# Patient Record
Sex: FEMALE | Race: BLACK OR AFRICAN AMERICAN | NOT HISPANIC OR LATINO | Employment: OTHER | URBAN - METROPOLITAN AREA
[De-identification: names, ages, dates, MRNs, and addresses within clinical notes are randomized per-mention and may not be internally consistent; named-entity substitution may affect disease eponyms.]

---

## 2023-02-11 ENCOUNTER — HOSPITAL ENCOUNTER (INPATIENT)
Facility: OTHER | Age: 83
LOS: 8 days | Discharge: HOME HEALTH | DRG: 313 | End: 2023-02-23
Attending: EMERGENCY MEDICINE | Admitting: INTERNAL MEDICINE
Payer: MEDICARE

## 2023-02-11 DIAGNOSIS — R53.81 DEBILITY: ICD-10-CM

## 2023-02-11 DIAGNOSIS — R07.9 CHEST PAIN: ICD-10-CM

## 2023-02-11 DIAGNOSIS — I20.0 UNSTABLE ANGINA: Primary | ICD-10-CM

## 2023-02-11 DIAGNOSIS — I21.4 NSTEMI (NON-ST ELEVATION MYOCARDIAL INFARCTION): ICD-10-CM

## 2023-02-11 DIAGNOSIS — R00.1 BRADYCARDIA: ICD-10-CM

## 2023-02-11 DIAGNOSIS — I21.4 NON-ST ELEVATION MYOCARDIAL INFARCTION (NSTEMI): ICD-10-CM

## 2023-02-11 LAB
ALBUMIN SERPL BCP-MCNC: 3.1 G/DL (ref 3.5–5.2)
ALP SERPL-CCNC: 89 U/L (ref 55–135)
ALT SERPL W/O P-5'-P-CCNC: 16 U/L (ref 10–44)
ANION GAP SERPL CALC-SCNC: 9 MMOL/L (ref 8–16)
AST SERPL-CCNC: 27 U/L (ref 10–40)
BASOPHILS # BLD AUTO: 0.03 K/UL (ref 0–0.2)
BASOPHILS NFR BLD: 0.5 % (ref 0–1.9)
BILIRUB SERPL-MCNC: 0.5 MG/DL (ref 0.1–1)
BUN SERPL-MCNC: 14 MG/DL (ref 8–23)
CALCIUM SERPL-MCNC: 10.8 MG/DL (ref 8.7–10.5)
CHLORIDE SERPL-SCNC: 97 MMOL/L (ref 95–110)
CO2 SERPL-SCNC: 30 MMOL/L (ref 23–29)
CREAT SERPL-MCNC: 0.8 MG/DL (ref 0.5–1.4)
CTP QC/QA: YES
D DIMER PPP IA.FEU-MCNC: 1.42 MG/L FEU
DIFFERENTIAL METHOD: ABNORMAL
EOSINOPHIL # BLD AUTO: 0.1 K/UL (ref 0–0.5)
EOSINOPHIL NFR BLD: 2.1 % (ref 0–8)
ERYTHROCYTE [DISTWIDTH] IN BLOOD BY AUTOMATED COUNT: 15 % (ref 11.5–14.5)
EST. GFR  (NO RACE VARIABLE): >60 ML/MIN/1.73 M^2
GIANT PLATELETS BLD QL SMEAR: PRESENT
GLUCOSE SERPL-MCNC: 221 MG/DL (ref 70–110)
HCT VFR BLD AUTO: 40.1 % (ref 37–48.5)
HGB BLD-MCNC: 13 G/DL (ref 12–16)
IMM GRANULOCYTES # BLD AUTO: 0.02 K/UL (ref 0–0.04)
IMM GRANULOCYTES NFR BLD AUTO: 0.3 % (ref 0–0.5)
LYMPHOCYTES # BLD AUTO: 1.9 K/UL (ref 1–4.8)
LYMPHOCYTES NFR BLD: 32.2 % (ref 18–48)
MCH RBC QN AUTO: 32.4 PG (ref 27–31)
MCHC RBC AUTO-ENTMCNC: 32.4 G/DL (ref 32–36)
MCV RBC AUTO: 100 FL (ref 82–98)
MONOCYTES # BLD AUTO: 0.5 K/UL (ref 0.3–1)
MONOCYTES NFR BLD: 8.6 % (ref 4–15)
NEUTROPHILS # BLD AUTO: 3.3 K/UL (ref 1.8–7.7)
NEUTROPHILS NFR BLD: 56.3 % (ref 38–73)
NRBC BLD-RTO: 0 /100 WBC
PLATELET # BLD AUTO: 124 K/UL (ref 150–450)
PLATELET BLD QL SMEAR: ABNORMAL
PMV BLD AUTO: 11 FL (ref 9.2–12.9)
POTASSIUM SERPL-SCNC: 5.3 MMOL/L (ref 3.5–5.1)
PROT SERPL-MCNC: 8.7 G/DL (ref 6–8.4)
RBC # BLD AUTO: 4.01 M/UL (ref 4–5.4)
SARS-COV-2 RDRP RESP QL NAA+PROBE: NEGATIVE
SODIUM SERPL-SCNC: 136 MMOL/L (ref 136–145)
TROPONIN I SERPL DL<=0.01 NG/ML-MCNC: 0.03 NG/ML (ref 0–0.03)
WBC # BLD AUTO: 5.84 K/UL (ref 3.9–12.7)

## 2023-02-11 PROCEDURE — 85379 FIBRIN DEGRADATION QUANT: CPT | Performed by: EMERGENCY MEDICINE

## 2023-02-11 PROCEDURE — 93005 ELECTROCARDIOGRAM TRACING: CPT

## 2023-02-11 PROCEDURE — 80053 COMPREHEN METABOLIC PANEL: CPT | Performed by: EMERGENCY MEDICINE

## 2023-02-11 PROCEDURE — 85025 COMPLETE CBC W/AUTO DIFF WBC: CPT | Performed by: EMERGENCY MEDICINE

## 2023-02-11 PROCEDURE — 84484 ASSAY OF TROPONIN QUANT: CPT | Performed by: EMERGENCY MEDICINE

## 2023-02-11 PROCEDURE — 93010 EKG 12-LEAD: ICD-10-PCS | Mod: ,,, | Performed by: INTERNAL MEDICINE

## 2023-02-11 PROCEDURE — 93010 ELECTROCARDIOGRAM REPORT: CPT | Mod: ,,, | Performed by: INTERNAL MEDICINE

## 2023-02-11 PROCEDURE — 99285 EMERGENCY DEPT VISIT HI MDM: CPT | Mod: 25

## 2023-02-12 PROBLEM — I10 PRIMARY HYPERTENSION: Status: ACTIVE | Noted: 2023-02-12

## 2023-02-12 PROBLEM — R79.89 ELEVATED TROPONIN: Status: ACTIVE | Noted: 2023-02-12

## 2023-02-12 PROBLEM — I20.0 UNSTABLE ANGINA: Status: ACTIVE | Noted: 2023-02-12

## 2023-02-12 PROBLEM — Z79.01 LONG TERM CURRENT USE OF ANTICOAGULANT: Status: ACTIVE | Noted: 2020-05-25

## 2023-02-12 PROBLEM — C50.919 BREAST CANCER: Status: ACTIVE | Noted: 2023-02-12

## 2023-02-12 PROBLEM — Z86.718 HISTORY OF DVT OF LOWER EXTREMITY: Status: ACTIVE | Noted: 2018-07-08

## 2023-02-12 PROBLEM — Z74.09 IMMOBILITY: Status: ACTIVE | Noted: 2023-02-12

## 2023-02-12 PROBLEM — E11.9 TYPE 2 DIABETES MELLITUS, WITH LONG-TERM CURRENT USE OF INSULIN: Status: ACTIVE | Noted: 2023-02-12

## 2023-02-12 PROBLEM — G89.3 CANCER RELATED PAIN: Status: ACTIVE | Noted: 2021-11-03

## 2023-02-12 PROBLEM — R52 PAIN: Status: ACTIVE | Noted: 2023-02-12

## 2023-02-12 PROBLEM — Z79.4 TYPE 2 DIABETES MELLITUS, WITH LONG-TERM CURRENT USE OF INSULIN: Status: ACTIVE | Noted: 2023-02-12

## 2023-02-12 PROBLEM — I48.91 ATRIAL FIBRILLATION: Status: ACTIVE | Noted: 2023-02-12

## 2023-02-12 PROBLEM — R53.81 DEBILITY: Status: ACTIVE | Noted: 2023-02-12

## 2023-02-12 LAB
ABO + RH BLD: NORMAL
APTT BLDCRRT: 27.5 SEC (ref 21–32)
BACTERIA #/AREA URNS HPF: ABNORMAL /HPF
BILIRUB UR QL STRIP: NEGATIVE
BLD GP AB SCN CELLS X3 SERPL QL: NORMAL
CHOLEST SERPL-MCNC: 150 MG/DL (ref 120–199)
CHOLEST/HDLC SERPL: 3.7 {RATIO} (ref 2–5)
CLARITY UR: CLEAR
COLOR UR: YELLOW
ESTIMATED AVG GLUCOSE: 186 MG/DL (ref 68–131)
GLUCOSE UR QL STRIP: NEGATIVE
HBA1C MFR BLD: 8.1 % (ref 4–5.6)
HDLC SERPL-MCNC: 41 MG/DL (ref 40–75)
HDLC SERPL: 27.3 % (ref 20–50)
HGB UR QL STRIP: ABNORMAL
INR PPP: 1 (ref 0.8–1.2)
KETONES UR QL STRIP: NEGATIVE
LDLC SERPL CALC-MCNC: 94.2 MG/DL (ref 63–159)
LEUKOCYTE ESTERASE UR QL STRIP: ABNORMAL
MICROSCOPIC COMMENT: ABNORMAL
NITRITE UR QL STRIP: POSITIVE
NONHDLC SERPL-MCNC: 109 MG/DL
PH UR STRIP: 6 [PH] (ref 5–8)
POCT GLUCOSE: 167 MG/DL (ref 70–110)
POCT GLUCOSE: 177 MG/DL (ref 70–110)
POCT GLUCOSE: 204 MG/DL (ref 70–110)
PROT UR QL STRIP: NEGATIVE
PROTHROMBIN TIME: 11.2 SEC (ref 9–12.5)
RBC #/AREA URNS HPF: 0 /HPF (ref 0–4)
SP GR UR STRIP: 1.01 (ref 1–1.03)
TRIGL SERPL-MCNC: 74 MG/DL (ref 30–150)
TROPONIN I SERPL DL<=0.01 NG/ML-MCNC: 0.03 NG/ML (ref 0–0.03)
TROPONIN I SERPL DL<=0.01 NG/ML-MCNC: 0.05 NG/ML (ref 0–0.03)
URN SPEC COLLECT METH UR: ABNORMAL
UROBILINOGEN UR STRIP-ACNC: NEGATIVE EU/DL
WBC #/AREA URNS HPF: 11 /HPF (ref 0–5)

## 2023-02-12 PROCEDURE — 36415 COLL VENOUS BLD VENIPUNCTURE: CPT | Performed by: HOSPITALIST

## 2023-02-12 PROCEDURE — 81000 URINALYSIS NONAUTO W/SCOPE: CPT | Performed by: EMERGENCY MEDICINE

## 2023-02-12 PROCEDURE — G0378 HOSPITAL OBSERVATION PER HR: HCPCS

## 2023-02-12 PROCEDURE — 87088 URINE BACTERIA CULTURE: CPT | Performed by: EMERGENCY MEDICINE

## 2023-02-12 PROCEDURE — 87077 CULTURE AEROBIC IDENTIFY: CPT | Performed by: EMERGENCY MEDICINE

## 2023-02-12 PROCEDURE — 85730 THROMBOPLASTIN TIME PARTIAL: CPT | Performed by: NURSE PRACTITIONER

## 2023-02-12 PROCEDURE — 63600175 PHARM REV CODE 636 W HCPCS: Performed by: NURSE PRACTITIONER

## 2023-02-12 PROCEDURE — 84484 ASSAY OF TROPONIN QUANT: CPT | Mod: 91 | Performed by: EMERGENCY MEDICINE

## 2023-02-12 PROCEDURE — 85610 PROTHROMBIN TIME: CPT | Performed by: NURSE PRACTITIONER

## 2023-02-12 PROCEDURE — 82962 GLUCOSE BLOOD TEST: CPT

## 2023-02-12 PROCEDURE — 87186 SC STD MICRODIL/AGAR DIL: CPT | Performed by: EMERGENCY MEDICINE

## 2023-02-12 PROCEDURE — 96372 THER/PROPH/DIAG INJ SC/IM: CPT | Performed by: NURSE PRACTITIONER

## 2023-02-12 PROCEDURE — 87086 URINE CULTURE/COLONY COUNT: CPT | Performed by: EMERGENCY MEDICINE

## 2023-02-12 PROCEDURE — 80061 LIPID PANEL: CPT | Performed by: NURSE PRACTITIONER

## 2023-02-12 PROCEDURE — 83036 HEMOGLOBIN GLYCOSYLATED A1C: CPT | Performed by: NURSE PRACTITIONER

## 2023-02-12 PROCEDURE — 99223 PR INITIAL HOSPITAL CARE,LEVL III: ICD-10-PCS | Mod: ,,, | Performed by: NURSE PRACTITIONER

## 2023-02-12 PROCEDURE — 97530 THERAPEUTIC ACTIVITIES: CPT

## 2023-02-12 PROCEDURE — 86900 BLOOD TYPING SEROLOGIC ABO: CPT | Performed by: NURSE PRACTITIONER

## 2023-02-12 PROCEDURE — 97166 OT EVAL MOD COMPLEX 45 MIN: CPT

## 2023-02-12 PROCEDURE — 25000003 PHARM REV CODE 250: Performed by: NURSE PRACTITIONER

## 2023-02-12 PROCEDURE — 99223 1ST HOSP IP/OBS HIGH 75: CPT | Mod: ,,, | Performed by: NURSE PRACTITIONER

## 2023-02-12 PROCEDURE — 25500020 PHARM REV CODE 255: Performed by: EMERGENCY MEDICINE

## 2023-02-12 PROCEDURE — 25000003 PHARM REV CODE 250: Performed by: PHYSICIAN ASSISTANT

## 2023-02-12 RX ORDER — RAMIPRIL 2.5 MG/1
2.5 CAPSULE ORAL DAILY
Status: ON HOLD | COMMUNITY
Start: 2023-01-13 | End: 2023-11-03

## 2023-02-12 RX ORDER — ANASTROZOLE 1 MG/1
1 TABLET ORAL DAILY
Status: DISCONTINUED | OUTPATIENT
Start: 2023-02-12 | End: 2023-02-23 | Stop reason: HOSPADM

## 2023-02-12 RX ORDER — ASPIRIN 325 MG
50000 TABLET, DELAYED RELEASE (ENTERIC COATED) ORAL
COMMUNITY
Start: 2023-02-07

## 2023-02-12 RX ORDER — NYSTATIN 100000 [USP'U]/G
POWDER TOPICAL 2 TIMES DAILY
COMMUNITY
Start: 2023-02-09

## 2023-02-12 RX ORDER — TALC
6 POWDER (GRAM) TOPICAL NIGHTLY PRN
Status: CANCELLED | OUTPATIENT
Start: 2023-02-12

## 2023-02-12 RX ORDER — PANTOPRAZOLE SODIUM 20 MG/1
20 TABLET, DELAYED RELEASE ORAL DAILY PRN
Status: ON HOLD | COMMUNITY
Start: 2022-12-05 | End: 2023-11-08 | Stop reason: HOSPADM

## 2023-02-12 RX ORDER — DILTIAZEM HYDROCHLORIDE 120 MG/1
120 CAPSULE, COATED, EXTENDED RELEASE ORAL DAILY
Status: ON HOLD | COMMUNITY
Start: 2022-11-12 | End: 2023-02-15 | Stop reason: HOSPADM

## 2023-02-12 RX ORDER — TIZANIDINE 2 MG/1
2 TABLET ORAL 2 TIMES DAILY PRN
Status: ON HOLD | COMMUNITY
Start: 2022-12-05 | End: 2023-11-03

## 2023-02-12 RX ORDER — SODIUM CHLORIDE 0.9 % (FLUSH) 0.9 %
10 SYRINGE (ML) INJECTION
Status: DISCONTINUED | OUTPATIENT
Start: 2023-02-12 | End: 2023-02-23 | Stop reason: HOSPADM

## 2023-02-12 RX ORDER — ESCITALOPRAM OXALATE 10 MG/1
10 TABLET ORAL DAILY
Status: DISCONTINUED | OUTPATIENT
Start: 2023-02-12 | End: 2023-02-23 | Stop reason: HOSPADM

## 2023-02-12 RX ORDER — APIXABAN 5 MG/1
5 TABLET, FILM COATED ORAL 2 TIMES DAILY
COMMUNITY
Start: 2023-01-13

## 2023-02-12 RX ORDER — TRAZODONE HYDROCHLORIDE 50 MG/1
50 TABLET ORAL NIGHTLY
Status: DISCONTINUED | OUTPATIENT
Start: 2023-02-12 | End: 2023-02-23 | Stop reason: HOSPADM

## 2023-02-12 RX ORDER — AMOXICILLIN 250 MG
1 CAPSULE ORAL DAILY
COMMUNITY

## 2023-02-12 RX ORDER — IBUPROFEN 200 MG
16 TABLET ORAL
Status: DISCONTINUED | OUTPATIENT
Start: 2023-02-12 | End: 2023-02-23 | Stop reason: HOSPADM

## 2023-02-12 RX ORDER — FLECAINIDE ACETATE 50 MG/1
100 TABLET ORAL EVERY 12 HOURS
Status: DISCONTINUED | OUTPATIENT
Start: 2023-02-12 | End: 2023-02-23 | Stop reason: HOSPADM

## 2023-02-12 RX ORDER — HYDROCHLOROTHIAZIDE 25 MG/1
25 TABLET ORAL DAILY
Status: DISCONTINUED | OUTPATIENT
Start: 2023-02-12 | End: 2023-02-14

## 2023-02-12 RX ORDER — GLYBURIDE 5 MG/1
5 TABLET ORAL EVERY MORNING
COMMUNITY
Start: 2023-01-12

## 2023-02-12 RX ORDER — GLUCAGON 1 MG
1 KIT INJECTION
Status: DISCONTINUED | OUTPATIENT
Start: 2023-02-12 | End: 2023-02-23 | Stop reason: HOSPADM

## 2023-02-12 RX ORDER — LISINOPRIL 2.5 MG/1
2.5 TABLET ORAL DAILY
Status: DISCONTINUED | OUTPATIENT
Start: 2023-02-12 | End: 2023-02-12

## 2023-02-12 RX ORDER — FUROSEMIDE 20 MG/1
20 TABLET ORAL 2 TIMES DAILY
Status: DISCONTINUED | OUTPATIENT
Start: 2023-02-12 | End: 2023-02-23 | Stop reason: HOSPADM

## 2023-02-12 RX ORDER — HYDROCODONE BITARTRATE AND ACETAMINOPHEN 5; 325 MG/1; MG/1
1 TABLET ORAL 2 TIMES DAILY PRN
Status: DISCONTINUED | OUTPATIENT
Start: 2023-02-12 | End: 2023-02-13

## 2023-02-12 RX ORDER — ANASTROZOLE 1 MG/1
1 TABLET ORAL DAILY
COMMUNITY
Start: 2023-01-26

## 2023-02-12 RX ORDER — HYDROCODONE BITARTRATE AND ACETAMINOPHEN 5; 325 MG/1; MG/1
1 TABLET ORAL 2 TIMES DAILY PRN
Status: ON HOLD | COMMUNITY
Start: 2023-01-20 | End: 2023-11-03

## 2023-02-12 RX ORDER — PANTOPRAZOLE SODIUM 40 MG/1
40 TABLET, DELAYED RELEASE ORAL DAILY PRN
Status: DISCONTINUED | OUTPATIENT
Start: 2023-02-12 | End: 2023-02-23 | Stop reason: HOSPADM

## 2023-02-12 RX ORDER — HEPARIN SODIUM 5000 [USP'U]/ML
5000 INJECTION, SOLUTION INTRAVENOUS; SUBCUTANEOUS EVERY 8 HOURS
Status: DISCONTINUED | OUTPATIENT
Start: 2023-02-12 | End: 2023-02-12

## 2023-02-12 RX ORDER — ATORVASTATIN CALCIUM 20 MG/1
80 TABLET, FILM COATED ORAL DAILY
Status: DISCONTINUED | OUTPATIENT
Start: 2023-02-12 | End: 2023-02-23 | Stop reason: HOSPADM

## 2023-02-12 RX ORDER — NAPROXEN SODIUM 220 MG/1
81 TABLET, FILM COATED ORAL DAILY
Status: DISCONTINUED | OUTPATIENT
Start: 2023-02-13 | End: 2023-02-12

## 2023-02-12 RX ORDER — LISINOPRIL 2.5 MG/1
2.5 TABLET ORAL DAILY
Status: DISCONTINUED | OUTPATIENT
Start: 2023-02-13 | End: 2023-02-23 | Stop reason: HOSPADM

## 2023-02-12 RX ORDER — DILTIAZEM HYDROCHLORIDE 120 MG/1
120 CAPSULE, COATED, EXTENDED RELEASE ORAL DAILY
Status: DISCONTINUED | OUTPATIENT
Start: 2023-02-12 | End: 2023-02-14

## 2023-02-12 RX ORDER — IBUPROFEN 200 MG
24 TABLET ORAL
Status: DISCONTINUED | OUTPATIENT
Start: 2023-02-12 | End: 2023-02-23 | Stop reason: HOSPADM

## 2023-02-12 RX ORDER — POLYETHYLENE GLYCOL 3350 17 G/17G
17 POWDER, FOR SOLUTION ORAL DAILY PRN
COMMUNITY

## 2023-02-12 RX ORDER — ONDANSETRON 2 MG/ML
4 INJECTION INTRAMUSCULAR; INTRAVENOUS EVERY 8 HOURS PRN
Status: CANCELLED | OUTPATIENT
Start: 2023-02-12

## 2023-02-12 RX ORDER — MICONAZOLE NITRATE 2 %
POWDER (GRAM) TOPICAL 2 TIMES DAILY
Status: DISCONTINUED | OUTPATIENT
Start: 2023-02-12 | End: 2023-02-23 | Stop reason: HOSPADM

## 2023-02-12 RX ORDER — TIZANIDINE 2 MG/1
2 TABLET ORAL 2 TIMES DAILY PRN
Status: DISCONTINUED | OUTPATIENT
Start: 2023-02-12 | End: 2023-02-23 | Stop reason: HOSPADM

## 2023-02-12 RX ORDER — FLECAINIDE ACETATE 100 MG/1
100 TABLET ORAL EVERY 12 HOURS
Status: ON HOLD | COMMUNITY
Start: 2023-01-13 | End: 2023-11-08 | Stop reason: HOSPADM

## 2023-02-12 RX ORDER — DIPHENHYDRAMINE HCL 50 MG
50 CAPSULE ORAL NIGHTLY PRN
COMMUNITY

## 2023-02-12 RX ORDER — TRAZODONE HYDROCHLORIDE 50 MG/1
50 TABLET ORAL NIGHTLY
Status: ON HOLD | COMMUNITY
Start: 2022-11-12 | End: 2023-11-08 | Stop reason: HOSPADM

## 2023-02-12 RX ORDER — INSULIN ASPART 100 [IU]/ML
0-5 INJECTION, SOLUTION INTRAVENOUS; SUBCUTANEOUS
Status: DISCONTINUED | OUTPATIENT
Start: 2023-02-12 | End: 2023-02-23 | Stop reason: HOSPADM

## 2023-02-12 RX ORDER — AMOXICILLIN 250 MG
1 CAPSULE ORAL DAILY
Status: DISCONTINUED | OUTPATIENT
Start: 2023-02-12 | End: 2023-02-13

## 2023-02-12 RX ORDER — TRAMADOL HYDROCHLORIDE 50 MG/1
50 TABLET ORAL EVERY 8 HOURS PRN
Status: DISCONTINUED | OUTPATIENT
Start: 2023-02-12 | End: 2023-02-13

## 2023-02-12 RX ORDER — METOPROLOL TARTRATE 25 MG/1
25 TABLET, FILM COATED ORAL 2 TIMES DAILY
Status: DISCONTINUED | OUTPATIENT
Start: 2023-02-12 | End: 2023-02-12

## 2023-02-12 RX ORDER — POLYETHYLENE GLYCOL 3350 17 G/17G
17 POWDER, FOR SOLUTION ORAL 2 TIMES DAILY PRN
Status: DISCONTINUED | OUTPATIENT
Start: 2023-02-12 | End: 2023-02-13

## 2023-02-12 RX ADMIN — INSULIN ASPART 1 UNITS: 100 INJECTION, SOLUTION INTRAVENOUS; SUBCUTANEOUS at 09:02

## 2023-02-12 RX ADMIN — ESCITALOPRAM OXALATE 10 MG: 10 TABLET ORAL at 09:02

## 2023-02-12 RX ADMIN — TRAZODONE HYDROCHLORIDE 50 MG: 50 TABLET ORAL at 09:02

## 2023-02-12 RX ADMIN — HYDROCODONE BITARTRATE AND ACETAMINOPHEN 1 TABLET: 5; 325 TABLET ORAL at 05:02

## 2023-02-12 RX ADMIN — ANASTROZOLE 1 MG: 1 TABLET, COATED ORAL at 02:02

## 2023-02-12 RX ADMIN — MICONAZOLE NITRATE: 20 POWDER TOPICAL at 09:02

## 2023-02-12 RX ADMIN — HEPARIN SODIUM 5000 UNITS: 5000 INJECTION INTRAVENOUS; SUBCUTANEOUS at 05:02

## 2023-02-12 RX ADMIN — FUROSEMIDE 20 MG: 20 TABLET ORAL at 09:02

## 2023-02-12 RX ADMIN — DOCUSATE SODIUM AND SENNOSIDES 1 TABLET: 8.6; 5 TABLET, FILM COATED ORAL at 12:02

## 2023-02-12 RX ADMIN — FLECAINIDE ACETATE 100 MG: 50 TABLET ORAL at 09:02

## 2023-02-12 RX ADMIN — IOHEXOL 100 ML: 350 INJECTION, SOLUTION INTRAVENOUS at 12:02

## 2023-02-12 RX ADMIN — POLYETHYLENE GLYCOL 3350 17 G: 17 POWDER, FOR SOLUTION ORAL at 12:02

## 2023-02-12 RX ADMIN — APIXABAN 5 MG: 2.5 TABLET, FILM COATED ORAL at 09:02

## 2023-02-12 RX ADMIN — DILTIAZEM HYDROCHLORIDE 120 MG: 120 CAPSULE, COATED, EXTENDED RELEASE ORAL at 12:02

## 2023-02-12 RX ADMIN — ATORVASTATIN CALCIUM 80 MG: 20 TABLET, FILM COATED ORAL at 09:02

## 2023-02-12 RX ADMIN — HYDROCHLOROTHIAZIDE 25 MG: 25 TABLET ORAL at 09:02

## 2023-02-12 RX ADMIN — METOPROLOL TARTRATE 25 MG: 25 TABLET, FILM COATED ORAL at 09:02

## 2023-02-12 NOTE — SUBJECTIVE & OBJECTIVE
Interval History: Resting in bed, reports ongoing bilateral shoulder and knee pain that is somewhat improved with PRNs. She denies cardiac chest pain or associated symptoms. She is interested in SNF placement.     Review of Systems   Constitutional:  Positive for activity change and fatigue. Negative for appetite change, chills and diaphoresis.   HENT:  Negative for sore throat and trouble swallowing.    Respiratory:  Negative for cough, chest tightness, shortness of breath and wheezing.    Cardiovascular:  Negative for chest pain, palpitations and leg swelling.   Gastrointestinal:  Positive for constipation (chronic). Negative for abdominal pain, diarrhea, nausea and vomiting.   Genitourinary:  Negative for decreased urine volume, difficulty urinating, dysuria and urgency.   Musculoskeletal:  Positive for arthralgias and gait problem. Negative for myalgias.   Skin: Negative.    Neurological:  Positive for weakness. Negative for dizziness, syncope, light-headedness and headaches.     Objective:     Vital Signs (Most Recent):  Temp: 99.3 °F (37.4 °C) (02/12/23 1211)  Pulse: 77 (02/12/23 1211)  Resp: 18 (02/12/23 1211)  BP: (!) 126/91 (02/12/23 1211)  SpO2: 99 % (02/12/23 1211)   Vital Signs (24h Range):  Temp:  [98.2 °F (36.8 °C)-99.3 °F (37.4 °C)] 99.3 °F (37.4 °C)  Pulse:  [51-78] 77  Resp:  [16-20] 18  SpO2:  [92 %-100 %] 99 %  BP: (114-148)/(56-91) 126/91        Body mass index is 45.76 kg/m².    Intake/Output Summary (Last 24 hours) at 2/13/2023 1309  Last data filed at 2/13/2023 0457  Gross per 24 hour   Intake --   Output 1001 ml   Net -1001 ml      Physical Exam  Vitals and nursing note reviewed.   Constitutional:       General: She is not in acute distress.     Appearance: Normal appearance. She is obese. She is not toxic-appearing.   Eyes:      Extraocular Movements: Extraocular movements intact.      Conjunctiva/sclera: Conjunctivae normal.   Cardiovascular:      Rate and Rhythm: Normal rate and regular  rhythm.      Heart sounds: Normal heart sounds.   Pulmonary:      Effort: Pulmonary effort is normal.      Breath sounds: Normal breath sounds.   Abdominal:      General: Bowel sounds are normal. There is no distension.      Palpations: Abdomen is soft.   Musculoskeletal:      Cervical back: Neck supple.      Right lower leg: No edema.      Left lower leg: No edema.   Skin:     General: Skin is warm and dry.   Neurological:      General: No focal deficit present.      Mental Status: She is alert. Mental status is at baseline.     Significant Labs: All pertinent labs within the past 24 hours have been reviewed.  CBC:   Recent Labs   Lab 02/11/23 2210   WBC 5.84   HGB 13.0   HCT 40.1   *     CMP:   Recent Labs   Lab 02/11/23  2210 02/13/23  0412 02/13/23  1224    137 137   K 5.3* 3.9 4.1   CL 97 96 95   CO2 30* 36* 34*   * 161* 271*   BUN 14 13 14   CREATININE 0.8 0.8 0.8   CALCIUM 10.8* 11.0* 10.8*   PROT 8.7*  --   --    ALBUMIN 3.1*  --  3.0*   BILITOT 0.5  --   --    ALKPHOS 89  --   --    AST 27  --   --    ALT 16  --   --    ANIONGAP 9 5* 8     Significant Imaging: I have reviewed all pertinent imaging results/findings within the past 24 hours.

## 2023-02-12 NOTE — PT/OT/SLP PROGRESS
Occupational Therapy      Patient Name:  Dania Her   MRN:  [id number]    Patient not seen today secondary to upward trending troponin currently 0.029. Awaiting ECHO and cards consult.  Will follow-up when appropriate.    2/12/2023

## 2023-02-12 NOTE — HPI
"Per admitting provider:  "The patient is a 81 yo female with a past medical history of atrial fibrillation, HTN, IDDM, and pulmonary hypertension who presents for evaluation of increasing frequency of her standing intermittent chest pain over the past few to many days. She states the chest pain sometimes occurs with exertion and other times has no inciting factors. She endorses associated dyspnea with these episodes and reports worsening leg swelling. She also notes productive cough with clear mucus "when I have hot soup or pepper." She states she has had episodes of diaphoresis "and I get hot hot hot." She attributes this to her breast cancer related medication, denies any acute change, or relation to her episodes of chest pain.  Denies recent fever, chills, pain elsewhere, and other somatic complaints.  The patient has a mildly elevated troponin to .028, d-dimer 1.42.  She will be admitted for further management of her elevated troponin and Cardiology consult."    History clarified with the patient, she reports she is been slowly becoming more debilitated and for the last 2 months she is been unable to walk.  Therefore, she turned the living room into her bedroom as she is unable to go up the stairs.  She lives at home with her 83-year-old  who also has multiple medical issues.  He is unable to provide proper care for her and her children her only able to stop by every now and then.  She has a sitter who comes only 3 days per week.  The rest of the time, she has no one to change her or turn her.  She said it has become increasingly clear to her that she may need to move into a nursing facility.  The reason she came to the hospital yesterday because her daughter decided to have a birthday party in the living room which is her bedroom.  She was upset about all the people invading her personal space and because she got upset all of her chronic aches and pains were exacerbated.  The chest pain she reported is " the same pain she has had on and off since her mastectomy, she also reported back pain and knee pain.  She denied any associated nausea, jaw pain, arm pain, diaphoresis, or shortness of breath.

## 2023-02-12 NOTE — ED NOTES
Pt  w/ eyes closed, mumbling, does open eyes and yell w/ verbal stimuli, repositioned to left lateral, pillow to back, side rails up times 2.VSS

## 2023-02-12 NOTE — PLAN OF CARE
Problem: Occupational Therapy  Goal: Occupational Therapy Goal  Description: Goals to be met by: 2/26/2023     Patient will increase functional independence with ADLs by performing:    Grooming while EOB with Minimal Assistance.  Sitting at edge of bed x15 minutes with Minimal Assistance.  Rolling to Bilateral with Moderate Assistance.   Supine to sit with Maximum Assistance.    Outcome: Ongoing, Progressing     Initial OT eval/treat complete.  Has leonel lift and W/C.  Currently needs hospital bed with pressure relieving mattress though will defer to next level of care.  Will require setting with 24/7 supervision/assist.  Anticipate SNF with transition to nursing home pending ability of family to continue providing care though will confer with PT to finalize discharge recommendation.  To benefit from continued acute care OT services to increase independence in self-care/functional transfers.  OT to follow.

## 2023-02-12 NOTE — PLAN OF CARE
MSW met with the patient at the bedside.     Patient is alert and oriented with no communication barriers.     Patient has a RW,WC & Cane.    Patients PCP is correct on the face sheet.     Patient choice pharmacy is bedside delivery.     Patients family will transport the patient home at discharge.     CM team will continue to follow.      02/12/23 1420   Discharge Assessment   Assessment Type Discharge Planning Assessment   Confirmed/corrected address, phone number and insurance Yes   Confirmed Demographics Correct on Facesheet   Source of Information patient;health record   People in Home significant other   Do you expect to return to your current living situation? Yes   Prior to hospitilization cognitive status: Alert/Oriented   Current cognitive status: Alert/Oriented   Walking or Climbing Stairs ambulation difficulty, requires equipment   Dressing/Bathing bathing difficulty, requires equipment   Equipment Currently Used at Home walker, rolling;cane, quad;wheelchair   Readmission within 30 days? No   Patient currently being followed by outpatient case management? No   Do you currently have service(s) that help you manage your care at home? No   Do you take prescription medications? Yes   Do you have prescription coverage? Yes   Do you have any problems affording any of your prescribed medications? No   Is the patient taking medications as prescribed? no   How do you get to doctors appointments? family or friend will provide;health plan transportation   Are you on dialysis? No  (Diabetic)   Do you take coumadin? No   Discharge Plan A Home with family;Home Health   Discharge Plan B Skilled Nursing Facility   DME Needed Upon Discharge  none   Discharge Plan discussed with: Patient   Discharge Barriers Identified None

## 2023-02-12 NOTE — ASSESSMENT & PLAN NOTE
Patient's FSGs are controlled on current medication regimen.  Last A1c reviewed-   Lab Results   Component Value Date    HGBA1C 7.8 (H) 11/07/2021     Most recent fingerstick glucose reviewed- No results for input(s): POCTGLUCOSE in the last 24 hours.  Current correctional scale  Low  Maintain anti-hyperglycemic dose as follows-   Antihyperglycemics (From admission, onward)    Start     Stop Route Frequency Ordered    02/12/23 0531  insulin aspart U-100 pen 0-5 Units         -- SubQ Before meals & nightly PRN 02/12/23 0431        Hold Oral hypoglycemics while patient is in the hospital.

## 2023-02-12 NOTE — HOSPITAL COURSE
Ms. Her was placed in observation 2/11 due to exacerbations of her chronic chest, back, and knee pain brought on by emotional distress at not being well cared for at home. Troponin trend 0.028 >> 0.046 >> 0.028; EKG with NSR and TWA/inferolateral changes that are seen on prior studies per Care Everywhere. D-dimer 1.42 with negative CTA chest. Intermittently with nonsustained SVT and also pauses, caridology consulted: diltiazem discontinued, discussed with patient's cardiologist outpatient, PPM placement to be considered in the future.Palliative Care consulted, appreciate assistance. PT/OT following and recommending SNF.     Disposition plans: CM/SW following with plans for jail placement since SNF was denied by insurance.  However family eventually decided to take patient home with [facility]. Stable for dc with PCP fu, return precautions discussed, no further questions at dc. Cardiology and PCP follow up advised at DC; follows with Cardiology at [facility].

## 2023-02-12 NOTE — ED TRIAGE NOTES
Dania Her, an 82 y.o. female presents to the ED c/o intermittent CP for two years, buttock pain. Pt took home medications and denies CP upon arrival to ED.       Chief Complaint   Patient presents with    Chest Pain     Intermittent daily CP times 6 weeks.  Pt took all home medications 45min prior to arrival pt denies chest pain upon arrival to ED     Review of patient's allergies indicates:  No Known Allergies  Past Medical History:   Diagnosis Date    Arthritis     Diabetes mellitus     Hypertension

## 2023-02-12 NOTE — ED NOTES
"Pt easily awakened, reports being "bed bound for 2 months", pt reports being taken care of at home by  and home health, limited ROM due to overall soreness , left shoulder w/ limited ROM due to "rotator cuff issues w/ my left shoulder", no surgery stated.  Side rails up times 2.  "

## 2023-02-12 NOTE — ASSESSMENT & PLAN NOTE
Troponin .028, .046; No chest pain currently.      Consult Cardiology  Trend Troponin  Echo  Cardiac monitoring

## 2023-02-12 NOTE — SUBJECTIVE & OBJECTIVE
Past Medical History:   Diagnosis Date    Arthritis     Diabetes mellitus     Hypertension        Past Surgical History:   Procedure Laterality Date     SECTION      OOPHORECTOMY         Review of patient's allergies indicates:  No Known Allergies    No current facility-administered medications on file prior to encounter.     Current Outpatient Medications on File Prior to Encounter   Medication Sig    escitalopram oxalate (LEXAPRO) 10 MG tablet Take 10 mg by mouth once daily.    furosemide (LASIX) 20 MG tablet Take 20 mg by mouth 2 (two) times daily.    hydrochlorothiazide (HYDRODIURIL) 25 MG tablet Take 25 mg by mouth once daily.    ramipril (ALTACE) 1.25 MG capsule Take 1.25 mg by mouth once daily.    tramadol (ULTRAM) 50 mg tablet Take 50 mg by mouth every 6 (six) hours as needed for Pain.     Family History    None       Tobacco Use    Smoking status: Never    Smokeless tobacco: Not on file   Substance and Sexual Activity    Alcohol use: Not on file    Drug use: Not on file    Sexual activity: Not on file     Review of Systems   Constitutional:  Positive for activity change. Negative for appetite change and fever.   HENT:  Negative for congestion, ear pain, rhinorrhea and sinus pressure.    Eyes:  Negative for pain and discharge.   Respiratory:  Negative for cough, chest tightness, shortness of breath and wheezing.    Cardiovascular:  Positive for chest pain. Negative for leg swelling.   Gastrointestinal:  Negative for abdominal distention, abdominal pain, diarrhea, nausea and vomiting.   Endocrine: Negative for cold intolerance and heat intolerance.   Genitourinary:  Negative for difficulty urinating, flank pain, frequency, hematuria and urgency.   Musculoskeletal:  Negative for arthralgias, joint swelling and myalgias.   Allergic/Immunologic: Negative for environmental allergies and food allergies.   Neurological:  Negative for dizziness, weakness, light-headedness and headaches.   Hematological:   Does not bruise/bleed easily.   Psychiatric/Behavioral:  Negative for agitation, behavioral problems and decreased concentration.    Objective:     Vital Signs (Most Recent):  Temp: 98.2 °F (36.8 °C) (02/11/23 2145)  Pulse: (!) 51 (02/12/23 0216)  Resp: 18 (02/12/23 0216)  BP: (!) 114/56 (02/12/23 0216)  SpO2: 99 % (02/12/23 0216)   Vital Signs (24h Range):  Temp:  [98.2 °F (36.8 °C)] 98.2 °F (36.8 °C)  Pulse:  [51-60] 51  Resp:  [16-20] 18  SpO2:  [96 %-100 %] 99 %  BP: (114-139)/(56-70) 114/56        There is no height or weight on file to calculate BMI.    Physical Exam  Constitutional:       Appearance: Normal appearance. She is well-developed.   HENT:      Head: Normocephalic.   Eyes:      General:         Right eye: No discharge.         Left eye: No discharge.      Conjunctiva/sclera: Conjunctivae normal.   Cardiovascular:      Rate and Rhythm: Normal rate and regular rhythm.      Pulses:           Radial pulses are 2+ on the right side and 2+ on the left side.      Heart sounds: Normal heart sounds.   Pulmonary:      Effort: Pulmonary effort is normal. No respiratory distress.      Breath sounds: Normal breath sounds.   Abdominal:      General: Bowel sounds are decreased. There is no distension.      Palpations: Abdomen is soft.      Tenderness: There is no abdominal tenderness.   Musculoskeletal:         General: Normal range of motion.      Cervical back: Normal range of motion and neck supple.   Skin:     General: Skin is warm and dry.      Capillary Refill: Capillary refill takes 2 to 3 seconds.   Neurological:      Mental Status: She is alert and oriented to person, place, and time.      GCS: GCS eye subscore is 4. GCS verbal subscore is 5. GCS motor subscore is 6.      Motor: Motor function is intact.   Psychiatric:         Mood and Affect: Mood normal.         Speech: Speech normal.         Behavior: Behavior normal.         Thought Content: Thought content normal.           Significant Labs: All  pertinent labs within the past 24 hours have been reviewed.  CBC:   Recent Labs   Lab 02/11/23  2210   WBC 5.84   HGB 13.0   HCT 40.1   *     CMP:   Recent Labs   Lab 02/11/23  2210      K 5.3*   CL 97   CO2 30*   *   BUN 14   CREATININE 0.8   CALCIUM 10.8*   PROT 8.7*   ALBUMIN 3.1*   BILITOT 0.5   ALKPHOS 89   AST 27   ALT 16   ANIONGAP 9       Significant Imaging: I have reviewed all pertinent imaging results/findings within the past 24 hours.

## 2023-02-12 NOTE — ED PROVIDER NOTES
"  Source of History:  Medical record, patient    Chief complaint:  Per triage note: "Chest Pain (Intermittent daily CP times 6 weeks.  Pt took all home medications 45min prior to arrival pt denies chest pain upon arrival to ED)  "    HPI:    Patient presents to the ED via EMS for evaluation of increasing frequency of her standing intermittent chest pain over the past few to many days. She states the chest pain sometimes occurs with exertion and other times has no inciting factors. She endorses associated dyspnea with these episodes and reports worsening leg swelling. She also notes productive cough with clear mucus "when I have hot soup or pepper." She states she has had episodes of diaphoresis "and I get hot hot hot." She attributes this to her breast cancer related medication, denies any acute change, or relation to her episodes of chest pain.   Denies recent fever, chills, pain elsewhere, and other somatic complaints.     This is the extent of the patient's complaints at this time.     ROS:   As per HPI and below:   General: No fever. Notes diaphoresis.   Cardiovascular: Notes chest pain. Notes leg swelling.  Respiratory:  Notes dyspnea. Notes cough.   Neuro:  No syncope.  No focal deficits.   Musculoskeletal: Notes chronic knee pain, back pain.       Review of patient's allergies indicates:  No Known Allergies    PMH:  As per HPI and below:  Past Medical History:   Diagnosis Date    Arthritis     Diabetes mellitus     Hypertension        Past Surgical History:   Procedure Laterality Date     SECTION      OOPHORECTOMY         Social History     Tobacco Use    Smoking status: Never       Physical Exam:      Nursing note and vitals reviewed.  /70   Pulse (!) 58   Temp 98.2 °F (36.8 °C) (Oral)   Resp 18   SpO2 96%     Constitutional: Chronically ill appearance. No distress.  Mumbling, at times difficult to understand speech.  Eyes: EOMI. No discharge. Anicteric.  HENT:   Neck: Normal range of " motion. Neck supple.  Cardiovascular: Normal rate. No murmur, no gallop and no friction rub heard.   Pulmonary/Chest: No respiratory distress. Effort normal. No wheezes, no rales, no rhonchi.   Abdominal: Bowel sounds normal. Soft. No distension and no mass. There is no tenderness. There is no rebound, no guarding, no tenderness at McBurney's point.  Neurological: GCS 15. Alert and oriented to person, place, and time. No gross cranial nerve, light touch or strength deficit.   Skin: Skin is warm and dry.   EXT: 2+ radial pulses.   Psychiatric: Behavior is normal. Judgment normal.        MDM:      ED Course as of 02/12/23 0100   Sat Feb 11, 2023   2150   --  EKG Interpretation: I independently reviewed and interpreted EKG which shows sinus bradycardia rhythm with 1st degree AV block at 56 beats per minute, no STEMI, no significant acute ST/T abnormalities, normal intervals.    No acute change compared to prior tracing.  --       [RC]   8563 Patient is an 82-year-old female with diabetes, hypertension, pulmonary hypertension, paroxysmal atrial fibrillation on chronic anticoagulation, history of breast cancer s/p left mastectomy, admitted January 29 to [facility] for acute UTI with altered mental status, who presents with intermittent chest pain.   External notes and sources reviewed: specialist note (surgery), recent discharge note.     Patient presents with intermittent chest pain, sometimes at rest, frequently and are worse with exertion associated at times with shortness of breath.  She denies associated fevers.  [RC]   8607 I independently reviewed and interpreted CXR which shows no pneumothorax, no focal consolidation, cardiomegaly, mild opacities consistent with pulmonary edema but more prominent on right.  I independently reviewed and interpreted labs which are notable for elevated troponin 0.028, elevated D-dimer, CBC unrevealing and unremarkable.  Will obtain CTA chest.  [RC]   Sun Feb 12, 2023   0051 I  independently interpreted and reviewed the patient's CTA chest, notable for no evidence of acute pulmonary embolus, or other acute chest process.     [RC]   0055 Patient history, findings, results, concern for unstable angina discussed with cardiologist Dr. Green.  He does not have any further specific recommendations at this time.  Patient has a true medical need for admission/observation. I have discussed the patient history, exam, findings with hospitalist CAROLINE Bliss, who accepts the patient.        [RC]      ED Course User Index  [RC] Miki Horton MD       Medications   iohexoL (OMNIPAQUE 350) injection 100 mL (100 mLs Intravenous Given 2/12/23 0002)            ---  I, Lorraine Brown, scribed for, and in the presence of, Dr. Horton. I performed the scribed service and the documentation accurately describes the services I performed. I attest to the accuracy of the note.     Physician Attestation for Scribe:   I, Miki Horton MD, reviewed documentation as scribed in my presence, which is both accurate and complete.    Diagnostic Impression:    1. Unstable angina    2. Chest pain         ED Disposition Condition    Observation Stable          No future appointments.           Miki Horton MD  02/12/23 0100

## 2023-02-12 NOTE — PLAN OF CARE
Danai is A&Ox4. Denies pain. Vital Stable on 2L O2. Purewick for Urine.  BM s/p Miralax per patient request. Repositioned every 2 hours. Max assist.     Problem: Adult Inpatient Plan of Care  Goal: Plan of Care Review  Outcome: Ongoing, Progressing  Goal: Patient-Specific Goal (Individualized)  Outcome: Ongoing, Progressing  Goal: Absence of Hospital-Acquired Illness or Injury  Outcome: Ongoing, Progressing  Goal: Optimal Comfort and Wellbeing  Outcome: Ongoing, Progressing  Goal: Readiness for Transition of Care  Outcome: Ongoing, Progressing     Problem: Adjustment to Illness (Acute Coronary Syndrome)  Goal: Optimal Adaptation to Illness  Outcome: Ongoing, Progressing     Problem: Dysrhythmia (Acute Coronary Syndrome)  Goal: Normalized Cardiac Rhythm  Outcome: Ongoing, Progressing     Problem: Cardiac-Related Pain (Acute Coronary Syndrome)  Goal: Absence of Cardiac-Related Pain  Outcome: Ongoing, Progressing     Problem: Hemodynamic Instability (Acute Coronary Syndrome)  Goal: Effective Cardiac Pump Function  Outcome: Ongoing, Progressing     Problem: Tissue Perfusion (Acute Coronary Syndrome)  Goal: Adequate Tissue Perfusion  Outcome: Ongoing, Progressing     Problem: Arrhythmia/Dysrhythmia (Cardiac Catheterization)  Goal: Stable Heart Rate and Rhythm  Outcome: Ongoing, Progressing     Problem: Bleeding (Cardiac Catheterization)  Goal: Absence of Bleeding  Outcome: Ongoing, Progressing     Problem: Contrast-Induced Injury Risk (Cardiac Catheterization)  Goal: Absence of Contrast-Induced Injury  Outcome: Ongoing, Progressing     Problem: Embolism (Cardiac Catheterization)  Goal: Absence of Embolism Signs and Symptoms  Outcome: Ongoing, Progressing     Problem: Ongoing Anesthesia/Sedation Effects (Cardiac Catheterization)  Goal: Anesthesia/Sedation Recovery  Outcome: Ongoing, Progressing     Problem: Pain (Cardiac Catheterization)  Goal: Acceptable Pain Control  Outcome: Ongoing, Progressing     Problem: Vascular  Access Protection (Cardiac Catheterization)  Goal: Absence of Vascular Access Complication  Outcome: Ongoing, Progressing     Problem: Skin Injury Risk Increased  Goal: Skin Health and Integrity  Outcome: Ongoing, Progressing     Problem: Diabetes Comorbidity  Goal: Blood Glucose Level Within Targeted Range  Outcome: Ongoing, Progressing     Problem: Coping Ineffective  Goal: Effective Coping  Outcome: Ongoing, Progressing     Problem: Impaired Wound Healing  Goal: Optimal Wound Healing  Outcome: Ongoing, Progressing

## 2023-02-12 NOTE — PT/OT/SLP EVAL
Occupational Therapy   Evaluation and Treatment    Name: Dania Her  MRN: [id number]  Admitting Diagnosis: Debility  Recent Surgery: * No surgery found *      Recommendations:     Discharge Recommendations:  (Anticipate SNF with transition to nursing home pending ability of family to continue providing care though will confer with PT to finalize discharge recommendation.)  Discharge Equipment Recommendations:  hospital bed  Barriers to discharge:  Decreased caregiver support (current functional level)    Assessment:   Initial OT eval/treat complete.  MAX A for B-rolling and TOTAL A for bed level toileting this day.  Has leonel lift and W/C.  Currently needs hospital bed with pressure relieving mattress though will defer to next level of care.  Will require setting with 24/7 supervision/assist.  Anticipate SNF with transition to nursing home pending ability of family to continue providing care though will confer with PT to finalize discharge recommendation.  Has paid assistance for 4 hours 3 days per week.  Daughters assist when not working.  Has elderly spouse that is unable to assist at home.  To benefit from continued acute care OT services to increase independence in self-care/functional transfers and to decrease caregiver burden.  OT to follow.     Dania Her is a 82 y.o. female with a medical diagnosis of Debility.  She presents with below deficits decreasing independence in self-care and increasing caregiver burden. Performance deficits affecting function: weakness, impaired endurance, impaired self care skills, impaired functional mobility, gait instability, decreased lower extremity function, decreased upper extremity function, impaired balance, decreased safety awareness, pain, decreased ROM, impaired cardiopulmonary response to activity, impaired skin.      Rehab Prognosis: Good; patient would benefit from acute skilled OT services to address these deficits and reach maximum level of function.    "    Plan:     Patient to be seen 3 x/week to address the above listed problems via self-care/home management, therapeutic activities, therapeutic exercises  Plan of Care Expires: 02/26/23  Plan of Care Reviewed with: patient    Subjective     Chief Complaint: With c/o BLE knee pain.  Patient/Family Comments/goals: No goals stated at this time.     Occupational Profile:  Lives with elderly spouse in 2SH with ramp entry and bedroom on 1st FL.  Sharla lift transfer bed<>W/C; daughters assist with bed level bathing, dressing, and toileting.  Pt. Reports that she feeds and grooms her self and assists sometimes with donning upper body clothing; Pt. Also reports that she sits up at EOB approx. 2-3 times per week.  Per EMR Pt. Has been bed bound for approx. 2 months.  Attempted to discuss functional level with Pt. Prior to this though Pt. Unable to adequately given information; no emergency contact information in chart to attempt to obtain information from family.  Equipment Used at Home: wheelchair, lift device  Assistance upon Discharge: Has paid assistance for 4 hours 3 days per week.  Daughters assist when not working.  Has elderly spouse that is unable to assist at home.     Pain/Comfort:  Pain Rating 1:  (Not rated.)  Location - Side 1: Bilateral  Location 1: knee  Pain Addressed 1: Reposition, Distraction, Cessation of Activity, Nurse notified  Pain Rating Post-Intervention 1:  (Not rated.)    Patients cultural, spiritual, Druze conflicts given the current situation:  (None stated.)    Objective:     Communicated with: Nanette Colindres RN prior to session.  Patient found HOB elevated with telemetry, peripheral IV upon OT entry to room.    General Precautions: Standard, fall (cardiac diet)  Orthopedic Precautions: N/A  Braces: N/A  Respiratory Status: Room air    Occupational Performance:    Bed Mobility:    Initially discussed sitting EOB with Pt. Though plan terminated as Pt. Stating, "I had a bowel movement.  " "It's tons of it."   MAX A for B-rolling with assist for upper/lower trunk management and BLE positioning  Able to maintain side lying gripping bed rail when instructed     Activities of Daily Living:  TOTAL A for bed level toileting  Required assist with back justice hygiene in side lying and front justice hygiene while supine  Able to lift arms less than 90 degrees of shoulder flexion for assist to thread clean hospital gown    Cognitive/Visual Perceptual:  Cognitive/Psychosocial Skills:  -       Oriented to: Person, Place, Time, and Situation   -       Follows Commands/attention:Easily distracted and Follows one-step commands  -       Communication: able to answer 80% of questions appropriately; extremely talkative and requires increased redirection  -       Memory: Deficits noted  -       Safety awareness/insight to disability: impaired   -       Mood/Affect/Coping skills/emotional control: Cooperative    Physical Exam:  Skin integrity: sacral wound noted   Upper Extremity Range of Motion:  -       Right Upper Extremity: WFL except for less than 50% of shoulder flex  -       Left Upper Extremity: WFL except for less than 50% of shoulder flex  Upper Extremity Strength: -       Right Upper Extremity: 3+/5 gross and 2+/5 at shoulder  -       Left Upper Extremity: 3+/5 gross and 2+/5 at shoulder   Strength: -       Right Upper Extremity: fair  -       Left Upper Extremity: fair    AMPAC 6 Click ADL:  AMPAC Total Score: 9    Treatment & Education:  Educated on role of OT and POC  Initially discussed sitting EOB with Pt. Though plan terminated as Pt. Stating, "I had a bowel movement.  It's tons of it."   MAX A for B-rolling with assist for upper/lower trunk management and BLE positioning  Able to maintain side lying gripping bed rail when instructed   TOTAL A for bed level toileting  Required assist with back justice hygiene in side lying and front justice hygiene while supine  Able to lift arms less than 90 degrees of " shoulder flexion for assist to thread clean hospital gown  Reviewed call light and importance of calling for assist    Patient left HOB elevated with all lines intact, call button in reach, and nursing notified    GOALS:   Multidisciplinary Problems       Occupational Therapy Goals          Problem: Occupational Therapy    Goal Priority Disciplines Outcome Interventions   Occupational Therapy Goal     OT, PT/OT Ongoing, Progressing    Description: Goals to be met by: 2023     Patient will increase functional independence with ADLs by performing:    Grooming while EOB with Minimal Assistance.  Sitting at edge of bed x15 minutes with Minimal Assistance.  Rolling to Bilateral with Moderate Assistance.   Supine to sit with Maximum Assistance.                         History:     Past Medical History:   Diagnosis Date    Arthritis     Diabetes mellitus     Hypertension          Past Surgical History:   Procedure Laterality Date     SECTION      OOPHORECTOMY         Time Tracking:     OT Date of Treatment: 23  OT Start Time: 1638  OT Stop Time: 1717  OT Total Time (min): 39 min    Billable Minutes:Evaluation 15  Therapeutic Activity 24    2023

## 2023-02-12 NOTE — H&P
"[facility] Medicine  History & Physical    Patient Name: Dania Her  MRN: [id number]  Patient Class: OP- Observation  Admission Date: 2023  Attending Physician: Ramin Kerr MD   Primary Care Provider: Primary Doctor No         Patient information was obtained from patient, past medical records and ER records.     Subjective:     Principal Problem:Elevated troponin    Chief Complaint:   Chief Complaint   Patient presents with    Chest Pain     Intermittent daily CP times 6 weeks.  Pt took all home medications 45min prior to arrival pt denies chest pain upon arrival to ED        HPI: The patient is a 83 yo female with a past medical history of atrial fibrillation, HTN, IDDM, and pulmonary hypertension who presents for evaluation of increasing frequency of her standing intermittent chest pain over the past few to many days. She states the chest pain sometimes occurs with exertion and other times has no inciting factors. She endorses associated dyspnea with these episodes and reports worsening leg swelling. She also notes productive cough with clear mucus "when I have hot soup or pepper." She states she has had episodes of diaphoresis "and I get hot hot hot." She attributes this to her breast cancer related medication, denies any acute change, or relation to her episodes of chest pain.  Denies recent fever, chills, pain elsewhere, and other somatic complaints.  The patient has a mildly elevated troponin to .028, d-dimer 1.42.  She will be admitted for further management of her elevated troponin and Cardiology consult.      Past Medical History:   Diagnosis Date    Arthritis     Diabetes mellitus     Hypertension        Past Surgical History:   Procedure Laterality Date     SECTION      OOPHORECTOMY         Review of patient's allergies indicates:  No Known Allergies    No current facility-administered medications on file prior to encounter.     Current Outpatient " Medications on File Prior to Encounter   Medication Sig    escitalopram oxalate (LEXAPRO) 10 MG tablet Take 10 mg by mouth once daily.    furosemide (LASIX) 20 MG tablet Take 20 mg by mouth 2 (two) times daily.    hydrochlorothiazide (HYDRODIURIL) 25 MG tablet Take 25 mg by mouth once daily.    ramipril (ALTACE) 1.25 MG capsule Take 1.25 mg by mouth once daily.    tramadol (ULTRAM) 50 mg tablet Take 50 mg by mouth every 6 (six) hours as needed for Pain.     Family History    None       Tobacco Use    Smoking status: Never    Smokeless tobacco: Not on file   Substance and Sexual Activity    Alcohol use: Not on file    Drug use: Not on file    Sexual activity: Not on file     Review of Systems   Constitutional:  Positive for activity change. Negative for appetite change and fever.   HENT:  Negative for congestion, ear pain, rhinorrhea and sinus pressure.    Eyes:  Negative for pain and discharge.   Respiratory:  Negative for cough, chest tightness, shortness of breath and wheezing.    Cardiovascular:  Positive for chest pain. Negative for leg swelling.   Gastrointestinal:  Negative for abdominal distention, abdominal pain, diarrhea, nausea and vomiting.   Endocrine: Negative for cold intolerance and heat intolerance.   Genitourinary:  Negative for difficulty urinating, flank pain, frequency, hematuria and urgency.   Musculoskeletal:  Negative for arthralgias, joint swelling and myalgias.   Allergic/Immunologic: Negative for environmental allergies and food allergies.   Neurological:  Negative for dizziness, weakness, light-headedness and headaches.   Hematological:  Does not bruise/bleed easily.   Psychiatric/Behavioral:  Negative for agitation, behavioral problems and decreased concentration.    Objective:     Vital Signs (Most Recent):  Temp: 98.2 °F (36.8 °C) (02/11/23 2145)  Pulse: (!) 51 (02/12/23 0216)  Resp: 18 (02/12/23 0216)  BP: (!) 114/56 (02/12/23 0216)  SpO2: 99 % (02/12/23 0216)   Vital Signs  (24h Range):  Temp:  [98.2 °F (36.8 °C)] 98.2 °F (36.8 °C)  Pulse:  [51-60] 51  Resp:  [16-20] 18  SpO2:  [96 %-100 %] 99 %  BP: (114-139)/(56-70) 114/56        There is no height or weight on file to calculate BMI.    Physical Exam  Constitutional:       Appearance: Normal appearance. She is well-developed.   HENT:      Head: Normocephalic.   Eyes:      General:         Right eye: No discharge.         Left eye: No discharge.      Conjunctiva/sclera: Conjunctivae normal.   Cardiovascular:      Rate and Rhythm: Normal rate and regular rhythm.      Pulses:           Radial pulses are 2+ on the right side and 2+ on the left side.      Heart sounds: Normal heart sounds.   Pulmonary:      Effort: Pulmonary effort is normal. No respiratory distress.      Breath sounds: Normal breath sounds.   Abdominal:      General: Bowel sounds are decreased. There is no distension.      Palpations: Abdomen is soft.      Tenderness: There is no abdominal tenderness.   Musculoskeletal:         General: Normal range of motion.      Cervical back: Normal range of motion and neck supple.   Skin:     General: Skin is warm and dry.      Capillary Refill: Capillary refill takes 2 to 3 seconds.   Neurological:      Mental Status: She is alert and oriented to person, place, and time.      GCS: GCS eye subscore is 4. GCS verbal subscore is 5. GCS motor subscore is 6.      Motor: Motor function is intact.   Psychiatric:         Mood and Affect: Mood normal.         Speech: Speech normal.         Behavior: Behavior normal.         Thought Content: Thought content normal.           Significant Labs: All pertinent labs within the past 24 hours have been reviewed.  CBC:   Recent Labs   Lab 02/11/23  2210   WBC 5.84   HGB 13.0   HCT 40.1   *     CMP:   Recent Labs   Lab 02/11/23  2210      K 5.3*   CL 97   CO2 30*   *   BUN 14   CREATININE 0.8   CALCIUM 10.8*   PROT 8.7*   ALBUMIN 3.1*   BILITOT 0.5   ALKPHOS 89   AST 27   ALT 16    ANIONGAP 9       Significant Imaging: I have reviewed all pertinent imaging results/findings within the past 24 hours.    Assessment/Plan:     * Elevated troponin  Troponin .028, .046; No chest pain currently.      Consult Cardiology  Trend Troponin  Echo  Cardiac monitoring        Type 2 diabetes mellitus, with long-term current use of insulin  Patient's FSGs are controlled on current medication regimen.  Last A1c reviewed-   Lab Results   Component Value Date    HGBA1C 7.8 (H) 11/07/2021     Most recent fingerstick glucose reviewed- No results for input(s): POCTGLUCOSE in the last 24 hours.  Current correctional scale  Low  Maintain anti-hyperglycemic dose as follows-   Antihyperglycemics (From admission, onward)    Start     Stop Route Frequency Ordered    02/12/23 0531  insulin aspart U-100 pen 0-5 Units         -- SubQ Before meals & nightly PRN 02/12/23 0431        Hold Oral hypoglycemics while patient is in the hospital.    Primary hypertension  Normotensive currently    Continue lopressor, hctz, lasix        VTE Risk Mitigation (From admission, onward)         Ordered     heparin (porcine) injection 5,000 Units  Every 8 hours         02/12/23 0256     IP VTE HIGH RISK PATIENT  Once         02/12/23 0256     Place sequential compression device  Until discontinued         02/12/23 0256                   Francisco Bliss NP  Department of Hospital Medicine   [facility] - Emergency Dept

## 2023-02-12 NOTE — PT/OT/SLP PROGRESS
Physical Therapy      Patient Name:  Dania Her   MRN:  [id number]    Patient not seen today secondary to Therapist assessment; pt with upward trending troponin levels with active cardiology consult. Will follow-up as schedule allows and as pt is available / appropriate for therapy services.

## 2023-02-12 NOTE — PROGRESS NOTES
History clarified with the patient, she reports she is been slowly becoming more debilitated and for the last 2 months she is been unable to walk.  Therefore, she turned the living room into her bedroom as she is unable to go up the stairs.  She lives at home with her 83-year-old  who also has multiple medical issues.  He is unable to provide proper care for her and her children her only able to stop by every now and then.  She has a sitter who comes only 3 days per week.  The rest of the time, she has no one to change her or turn her.  She said it has become increasingly clear to her that she may need to move into a nursing facility.  The reason she came to the hospital yesterday because her daughter decided to have a birthday party in the living room which is her bedroom.  She was upset about all the people invading her personal space and because she got upset all of her chronic aches and pains were exacerbated.  The chest pain she reported is the same pain she has had on and off since her mastectomy, she also reported back pain and knee pain.  She denied any associated nausea, jaw pain, arm pain, diaphoresis, or shortness of breath.  She denies pain currently.    [facility] completed - patient had a printed list of her medications.      Debility      PT/OT consults       to assist with NH placement      Palliative care for GOC    Elevated troponin  Troponin .028, .046, 0.026  Quality of chest pain not concerning for cardiac pain, especially given it is chronic and patient states has been occurring since her mastectomy.  No chest pain currently.    No concern for ACS.     Atrial fibrillation  Essential HTN      Continue home flecainide, diltiazem      Continue home lasix, hctz, lisinopril      Continue home apixaban.    History of breast cancer      Continue anastrozole    Type 2 diabetes mellitus, with long-term current use of insulin  Patient's FSGs are controlled on current medication regimen.  Last A1c  7.8  Current correctional scale  Low  Hold Oral hypoglycemics while patient is in the hospital.     Primary hypertension  Normotensive currently  Continue lopressor, hctz, lasix

## 2023-02-12 NOTE — ED NOTES
Pt back from CT. Pt in no acute distress with chest noted to equal rise and fall. Pt AAOx4. Pt placed on waffle mattress to maintain patient's skin integrity. Will continue to monitor for changes in pt condition and new MD orders.

## 2023-02-13 PROBLEM — G89.29 CHRONIC CHEST WALL PAIN: Status: ACTIVE | Noted: 2023-02-13

## 2023-02-13 PROBLEM — Z86.79 HISTORY OF SUPRAVENTRICULAR TACHYCARDIA: Status: ACTIVE | Noted: 2023-02-13

## 2023-02-13 PROBLEM — R07.89 CHRONIC CHEST WALL PAIN: Status: ACTIVE | Noted: 2023-02-13

## 2023-02-13 PROBLEM — Z74.09 DECREASED FUNCTIONAL MOBILITY AND ENDURANCE: Status: ACTIVE | Noted: 2023-02-13

## 2023-02-13 PROBLEM — G89.29 CHRONIC PAIN OF BOTH SHOULDERS: Status: ACTIVE | Noted: 2023-02-13

## 2023-02-13 PROBLEM — M25.511 CHRONIC PAIN OF BOTH SHOULDERS: Status: ACTIVE | Noted: 2023-02-13

## 2023-02-13 PROBLEM — M25.512 CHRONIC PAIN OF BOTH SHOULDERS: Status: ACTIVE | Noted: 2023-02-13

## 2023-02-13 LAB
ALBUMIN SERPL BCP-MCNC: 3 G/DL (ref 3.5–5.2)
ANION GAP SERPL CALC-SCNC: 5 MMOL/L (ref 8–16)
ANION GAP SERPL CALC-SCNC: 8 MMOL/L (ref 8–16)
BUN SERPL-MCNC: 13 MG/DL (ref 8–23)
BUN SERPL-MCNC: 14 MG/DL (ref 8–23)
CALCIUM SERPL-MCNC: 10.8 MG/DL (ref 8.7–10.5)
CALCIUM SERPL-MCNC: 11 MG/DL (ref 8.7–10.5)
CHLORIDE SERPL-SCNC: 95 MMOL/L (ref 95–110)
CHLORIDE SERPL-SCNC: 96 MMOL/L (ref 95–110)
CO2 SERPL-SCNC: 34 MMOL/L (ref 23–29)
CO2 SERPL-SCNC: 36 MMOL/L (ref 23–29)
CREAT SERPL-MCNC: 0.8 MG/DL (ref 0.5–1.4)
CREAT SERPL-MCNC: 0.8 MG/DL (ref 0.5–1.4)
EST. GFR  (NO RACE VARIABLE): >60 ML/MIN/1.73 M^2
EST. GFR  (NO RACE VARIABLE): >60 ML/MIN/1.73 M^2
GLUCOSE SERPL-MCNC: 161 MG/DL (ref 70–110)
GLUCOSE SERPL-MCNC: 271 MG/DL (ref 70–110)
LACTATE SERPL-SCNC: 2.1 MMOL/L (ref 0.5–2.2)
MAGNESIUM SERPL-MCNC: 2.1 MG/DL (ref 1.6–2.6)
PHOSPHATE SERPL-MCNC: 3.2 MG/DL (ref 2.7–4.5)
POCT GLUCOSE: 183 MG/DL (ref 70–110)
POCT GLUCOSE: 224 MG/DL (ref 70–110)
POTASSIUM SERPL-SCNC: 3.9 MMOL/L (ref 3.5–5.1)
POTASSIUM SERPL-SCNC: 4.1 MMOL/L (ref 3.5–5.1)
SODIUM SERPL-SCNC: 137 MMOL/L (ref 136–145)
SODIUM SERPL-SCNC: 137 MMOL/L (ref 136–145)
TROPONIN I SERPL DL<=0.01 NG/ML-MCNC: 0.02 NG/ML (ref 0–0.03)

## 2023-02-13 PROCEDURE — 99233 SBSQ HOSP IP/OBS HIGH 50: CPT | Mod: ,,, | Performed by: NURSE PRACTITIONER

## 2023-02-13 PROCEDURE — 80069 RENAL FUNCTION PANEL: CPT | Performed by: NURSE PRACTITIONER

## 2023-02-13 PROCEDURE — G0378 HOSPITAL OBSERVATION PER HR: HCPCS

## 2023-02-13 PROCEDURE — 97162 PT EVAL MOD COMPLEX 30 MIN: CPT

## 2023-02-13 PROCEDURE — 84484 ASSAY OF TROPONIN QUANT: CPT | Performed by: NURSE PRACTITIONER

## 2023-02-13 PROCEDURE — 97110 THERAPEUTIC EXERCISES: CPT

## 2023-02-13 PROCEDURE — 36415 COLL VENOUS BLD VENIPUNCTURE: CPT | Performed by: NURSE PRACTITIONER

## 2023-02-13 PROCEDURE — 25000003 PHARM REV CODE 250: Performed by: NURSE PRACTITIONER

## 2023-02-13 PROCEDURE — 99223 PR INITIAL HOSPITAL CARE,LEVL III: ICD-10-PCS | Mod: ,,, | Performed by: STUDENT IN AN ORGANIZED HEALTH CARE EDUCATION/TRAINING PROGRAM

## 2023-02-13 PROCEDURE — 97530 THERAPEUTIC ACTIVITIES: CPT

## 2023-02-13 PROCEDURE — 36415 COLL VENOUS BLD VENIPUNCTURE: CPT | Performed by: PHYSICIAN ASSISTANT

## 2023-02-13 PROCEDURE — 83605 ASSAY OF LACTIC ACID: CPT | Performed by: NURSE PRACTITIONER

## 2023-02-13 PROCEDURE — 99233 PR SUBSEQUENT HOSPITAL CARE,LEVL III: ICD-10-PCS | Mod: ,,, | Performed by: NURSE PRACTITIONER

## 2023-02-13 PROCEDURE — 25000003 PHARM REV CODE 250: Performed by: PHYSICIAN ASSISTANT

## 2023-02-13 PROCEDURE — 99223 1ST HOSP IP/OBS HIGH 75: CPT | Mod: ,,, | Performed by: STUDENT IN AN ORGANIZED HEALTH CARE EDUCATION/TRAINING PROGRAM

## 2023-02-13 PROCEDURE — 97535 SELF CARE MNGMENT TRAINING: CPT

## 2023-02-13 PROCEDURE — 80048 BASIC METABOLIC PNL TOTAL CA: CPT | Performed by: PHYSICIAN ASSISTANT

## 2023-02-13 PROCEDURE — 83735 ASSAY OF MAGNESIUM: CPT | Performed by: PHYSICIAN ASSISTANT

## 2023-02-13 RX ORDER — METOPROLOL TARTRATE 1 MG/ML
5 INJECTION, SOLUTION INTRAVENOUS EVERY 5 MIN PRN
Status: DISCONTINUED | OUTPATIENT
Start: 2023-02-13 | End: 2023-02-23 | Stop reason: HOSPADM

## 2023-02-13 RX ORDER — OXYCODONE AND ACETAMINOPHEN 7.5; 325 MG/1; MG/1
1 TABLET ORAL EVERY 4 HOURS PRN
Status: DISCONTINUED | OUTPATIENT
Start: 2023-02-13 | End: 2023-02-19

## 2023-02-13 RX ORDER — AMOXICILLIN 250 MG
1 CAPSULE ORAL 2 TIMES DAILY
Status: DISCONTINUED | OUTPATIENT
Start: 2023-02-13 | End: 2023-02-23 | Stop reason: HOSPADM

## 2023-02-13 RX ORDER — ACETAMINOPHEN 325 MG/1
650 TABLET ORAL EVERY 6 HOURS PRN
Status: DISCONTINUED | OUTPATIENT
Start: 2023-02-13 | End: 2023-02-23 | Stop reason: HOSPADM

## 2023-02-13 RX ORDER — HYDROMORPHONE HYDROCHLORIDE 1 MG/ML
1 INJECTION, SOLUTION INTRAMUSCULAR; INTRAVENOUS; SUBCUTANEOUS EVERY 6 HOURS PRN
Status: DISCONTINUED | OUTPATIENT
Start: 2023-02-13 | End: 2023-02-19

## 2023-02-13 RX ORDER — POLYETHYLENE GLYCOL 3350 17 G/17G
17 POWDER, FOR SOLUTION ORAL DAILY
Status: DISCONTINUED | OUTPATIENT
Start: 2023-02-14 | End: 2023-02-23 | Stop reason: HOSPADM

## 2023-02-13 RX ORDER — BISACODYL 10 MG
10 SUPPOSITORY, RECTAL RECTAL DAILY PRN
Status: DISCONTINUED | OUTPATIENT
Start: 2023-02-13 | End: 2023-02-23 | Stop reason: HOSPADM

## 2023-02-13 RX ADMIN — MICONAZOLE NITRATE: 20 POWDER TOPICAL at 09:02

## 2023-02-13 RX ADMIN — DOCUSATE SODIUM AND SENNOSIDES 1 TABLET: 8.6; 5 TABLET, FILM COATED ORAL at 09:02

## 2023-02-13 RX ADMIN — FUROSEMIDE 20 MG: 20 TABLET ORAL at 09:02

## 2023-02-13 RX ADMIN — HYDROCHLOROTHIAZIDE 25 MG: 25 TABLET ORAL at 09:02

## 2023-02-13 RX ADMIN — HYDROCODONE BITARTRATE AND ACETAMINOPHEN 1 TABLET: 5; 325 TABLET ORAL at 09:02

## 2023-02-13 RX ADMIN — APIXABAN 5 MG: 2.5 TABLET, FILM COATED ORAL at 08:02

## 2023-02-13 RX ADMIN — SENNOSIDES AND DOCUSATE SODIUM 1 TABLET: 50; 8.6 TABLET ORAL at 08:02

## 2023-02-13 RX ADMIN — INSULIN ASPART 1 UNITS: 100 INJECTION, SOLUTION INTRAVENOUS; SUBCUTANEOUS at 08:02

## 2023-02-13 RX ADMIN — METOROPROLOL TARTRATE 5 MG: 5 INJECTION, SOLUTION INTRAVENOUS at 04:02

## 2023-02-13 RX ADMIN — TRAZODONE HYDROCHLORIDE 50 MG: 50 TABLET ORAL at 08:02

## 2023-02-13 RX ADMIN — APIXABAN 5 MG: 2.5 TABLET, FILM COATED ORAL at 09:02

## 2023-02-13 RX ADMIN — FLECAINIDE ACETATE 100 MG: 50 TABLET ORAL at 09:02

## 2023-02-13 RX ADMIN — LISINOPRIL 2.5 MG: 2.5 TABLET ORAL at 09:02

## 2023-02-13 RX ADMIN — ATORVASTATIN CALCIUM 80 MG: 20 TABLET, FILM COATED ORAL at 09:02

## 2023-02-13 RX ADMIN — ESCITALOPRAM OXALATE 10 MG: 10 TABLET ORAL at 09:02

## 2023-02-13 RX ADMIN — ANASTROZOLE 1 MG: 1 TABLET, COATED ORAL at 09:02

## 2023-02-13 RX ADMIN — FLECAINIDE ACETATE 100 MG: 50 TABLET ORAL at 08:02

## 2023-02-13 RX ADMIN — FUROSEMIDE 20 MG: 20 TABLET ORAL at 08:02

## 2023-02-13 RX ADMIN — DILTIAZEM HYDROCHLORIDE 120 MG: 120 CAPSULE, COATED, EXTENDED RELEASE ORAL at 09:02

## 2023-02-13 RX ADMIN — MICONAZOLE NITRATE: 20 POWDER TOPICAL at 08:02

## 2023-02-13 NOTE — PT/OT/SLP PROGRESS
Occupational Therapy   Treatment    Name: Dania Her  MRN: [id number]  Admitting Diagnosis:  Debility       Recommendations:     Discharge Recommendations: nursing facility, skilled (with transition to NH)  Discharge Equipment Recommendations:  hospital bed  Barriers to discharge:  Decreased caregiver support    Assessment:     Dania Her is a 82 y.o. female with a medical diagnosis of Debility.  She presents with weakness, impaired endurance, impaired self care skills, impaired functional mobility, decreased coordination, decreased upper extremity function, decreased lower extremity function, pain, impaired coordination, decreased ROM, impaired cardiopulmonary response to activity.     Rehab Prognosis:  Fair; patient would benefit from acute skilled OT services to address these deficits and reach maximum level of function.       Plan:     Patient to be seen 3 x/week to address the above listed problems via self-care/home management, therapeutic exercises, therapeutic activities  Plan of Care Expires: 02/26/23  Plan of Care Reviewed with: patient    Patient would benefit from 1-2 more OT sessions to establish HEP and assess discharge dispo as well as rehab potential. At this time, pt verbalized goals to progress functional mobility, independence with ADLs and strength. Pt has been bedbound for approx 2 months and using a leonel lift to transfer to her wheelchair. She has also been requiring assistance with all ADLs. OT will continue to assess.     Subjective     Pain/Comfort:  Pain Rating 1: other (see comments) (endorsed unrated pain/stiffness in B knees/shoulders/BLEs)  Pain Addressed 1: Reposition, Distraction, Cessation of Activity, Nurse notified    Objective:     Communicated with: RN prior to session.  Patient found HOB elevated with bed alarm, peripheral IV upon OT entry to room.    General Precautions: Standard, fall    Orthopedic Precautions:N/A  Braces: N/A  Respiratory Status: Room air      Occupational Performance:     Bed Mobility:    Scooting: dependent x2      Functional Mobility/Transfers:  Bed level only this date. Pt unable to initiate supine to sitting. Focused on bed level ADLs this date.  Functional Mobility: Functional bed mobility performed to improve activity tolerance for increased endurance and independence with ADLs as well as functional balance retraining for fall prevention.      Activities of Daily Living:  Grooming: Pt required set up and SBA for hair care, able to combo through bangs of hair x2 before becoming fatigued.   LBD: dependent to don socks in supine       [facility] 6 Click ADL: 9    Treatment & Education:  OT role, plan of care, progression of goals, importance of continued OOB activity, ADL/functional transfer and mobility retraining, discharge recommendation, call don't fall.     Patient left HOB elevated with all lines intact, call button in reach, bed alarm on, and RN notified    GOALS:   Multidisciplinary Problems       Occupational Therapy Goals          Problem: Occupational Therapy    Goal Priority Disciplines Outcome Interventions   Occupational Therapy Goal     OT, PT/OT Ongoing, Progressing    Description: Goals to be met by: 2/26/2023     Patient will increase functional independence with ADLs by performing:    Grooming while EOB with Minimal Assistance.  Sitting at edge of bed x15 minutes with Minimal Assistance.  Rolling to Bilateral with Moderate Assistance.   Supine to sit with Maximum Assistance.                         Time Tracking:     OT Date of Treatment: 02/13/23  OT Start Time: 0904  OT Stop Time: 0928  OT Total Time (min): 24 min    Billable Minutes:Self Care/Home Management 12 minutes  Therapeutic Activity 12 minutes    Co treament performed due to patient's multiple deficits requiring two skilled therapists to safety assess patient's ability to complete ADLs and functional mobility in addition to accommodating for patient's activity tolerance  while in acute care setting.      OT/BRYANT: OT          2/13/2023

## 2023-02-13 NOTE — CONSULTS
"Consult Note  Palliative Care    Patient Name: Dania Her  MRN: [id number]  Admission Date: 2/11/2023  Hospital Length of Stay: 0 days  Code Status: Full Code   Attending Provider: Ramin Kerr MD  Primary Care Physician: Primary Doctor No  Principal Problem:Debility    Consult Requested By: Dr. Kerr  Reason for Consult: ACP    ASSESSMENT/PLAN:     # chest pain  - management per cardiology/primary team  - resolved     # hx breast cancer  - on management with anastrazole     # afib  - noted hx, management per primary team    #debility  - chronic in nature  - unclear etiology, possibly multifactorial  - wants to get stronger and better  - management per primary team/pt    #Advanced care planning  - introduced ourselves and palliative care  - seen with palliative RN Amelie  - patient has a good understanding of her overall medical ongoings  - currently feeling better than when first came in  - her biggest concern is her inability to walk which she hopes can be improved  - she discussed her difficult home situation with her dialysis  who refuses to go to a nursing home and how her daughters have not bee very helpful in her care and have a lot going on in their own lives  - she currently isnt able to care for herself and would like to get more assistance with being in a nursing home  - she clearly states she wants to feel better and get stronger. She is not ready to give up. She has a 2 year old grandson she wants to live for and be around for.   - discussed code status and she made her desire for full code clear as she takes this journey, but she is going to think further about it and keep it in mind down the line if things worsen and don't seem like they can get better. For now she remains hopeful.   [] continue with current full management  [] patient open to SNF then NH placement, her goals are to get better and stronger  [] remains full code      HPI:    Per H&P: "The patient is a 81 yo female with a " "past medical history of atrial fibrillation, HTN, IDDM, and pulmonary hypertension who presents for evaluation of increasing frequency of her standing intermittent chest pain over the past few to many days. She states the chest pain sometimes occurs with exertion and other times has no inciting factors. She endorses associated dyspnea with these episodes and reports worsening leg swelling. She also notes productive cough with clear mucus "when I have hot soup or pepper." She states she has had episodes of diaphoresis "and I get hot hot hot." She attributes this to her breast cancer related medication, denies any acute change, or relation to her episodes of chest pain.  Denies recent fever, chills, pain elsewhere, and other somatic complaints.  The patient has a mildly elevated troponin to .028, d-dimer 1.42.  She will be admitted for further management of her elevated troponin and Cardiology consult."     Palliative care consulted for assistance with ACP.     Interval History/subjective:  - patient seen at bedside with palliative RN Amelie  - patient open to us talking with her  - she had just finished eating food   - denies any discomfort or concerns at the moment          Past Medical History:   Diagnosis Date    Arthritis      Diabetes mellitus      Hypertension                 Past Surgical History:   Procedure Laterality Date     SECTION        OOPHORECTOMY             Review of patient's allergies indicates:  No Known Allergies     Medications:  Continuous Infusions:  Scheduled Meds:   anastrozole  1 mg Oral Daily    apixaban  5 mg Oral BID    atorvastatin  80 mg Oral Daily    diltiaZEM  120 mg Oral Daily    EScitalopram oxalate  10 mg Oral Daily    flecainide  100 mg Oral Q12H    furosemide  20 mg Oral BID    hydroCHLOROthiazide  25 mg Oral Daily    lisinopriL  2.5 mg Oral Daily    miconazole NITRATE 2 %   Topical (Top) BID    [START ON 2023] polyethylene glycol  17 g Oral Daily    senna-docusate " 8.6-50 mg  1 tablet Oral BID    traZODone  50 mg Oral QHS      PRN Meds:acetaminophen, bisacodyL, dextrose 10%, dextrose 10%, glucagon (human recombinant), glucose, glucose, HYDROmorphone, insulin aspart U-100, metoprolol, oxyCODONE-acetaminophen, pantoprazole, sodium chloride 0.9%, tiZANidine     Family History    None              Tobacco Use    Smoking status: Never    Smokeless tobacco: Not on file   Substance and Sexual Activity    Alcohol use: Not on file    Drug use: Not on file    Sexual activity: Not on file      ROS; per hpi/subjective     Objective:      Vital Signs (Most Recent):  Temp: 98 °F (36.7 °C) (02/13/23 1244)  Pulse: 76 (02/13/23 1244)  Resp: 18 (02/13/23 1244)  BP: 103/61 (02/13/23 1244)  SpO2: 98 % (02/13/23 1244) Vital Signs (24h Range):  Temp:  [97.5 °F (36.4 °C)-98.4 °F (36.9 °C)] 98 °F (36.7 °C)  Pulse:  [] 76  Resp:  [18-20] 18  SpO2:  [91 %-99 %] 98 %  BP: (103-153)/(61-73) 103/61      Weight: 124.7 kg (275 lb)  Body mass index is 45.76 kg/m².     Physical Exam  Constitutional:       General: She is not in acute distress.     Appearance: She is obese. She is not ill-appearing.   Eyes:      Extraocular Movements: Extraocular movements intact.   Pulmonary:      Effort: Pulmonary effort is normal. No respiratory distress.   Skin:     General: Skin is warm.   Neurological:      Mental Status: She is alert.      Comments: Awake, alert, has insight, conversational         Review of Symptoms: per hpi/subjective           Living Arrangements:  Lives with family     Psychosocial/Cultural:   See Palliative Psychosocial Note: Yes  - she was living at home with her 83 y.o  who has his own co-morbidities (dialysis) and with a daughter  - she has a son and 2 daughters and has 3 grandchildren  - has not walked for over a month  - difficulties caring for herself at home  **Primary  to Follow**  Palliative Care  Consult: No        Advance Care Planning   Advance  Directives:      Decision Making:  Patient answered questions  Goals of Care: The patient endorses that what is most important right now is to focus on remaining as independent as possible and improvement in condition.     Accordingly, we have decided that the best plan to meet the patient's goals includes continuing with treatment     Significant Labs: Reviewed  CBC:       Recent Labs   Lab 02/11/23  2210   WBC 5.84   HGB 13.0   HCT 40.1   *   *      BMP:       Recent Labs   Lab 02/13/23  0412 02/13/23  1224   * 271*    137   K 3.9 4.1   CL 96 95   CO2 36* 34*   BUN 13 14   CREATININE 0.8 0.8   CALCIUM 11.0* 10.8*   MG 2.1  --       LFT:        Lab Results   Component Value Date     AST 27 02/11/2023     ALKPHOS 89 02/11/2023     BILITOT 0.5 02/11/2023      Albumin:         Albumin   Date Value Ref Range Status   02/13/2023 3.0 (L) 3.5 - 5.2 g/dL Final      Protein:         Total Protein   Date Value Ref Range Status   02/11/2023 8.7 (H) 6.0 - 8.4 g/dL Final      Lactic acid:         Lab Results   Component Value Date     LACTATE 2.1 02/13/2023         Significant Imaging: Reviewed    Thank you for this consult. We will follow via peripheral chart review while patient is inpatient. Please reach out to us again if additional assistance needed or if any further questions/concerns come up.     > 50% of 70 min visit spent in chart review, face to face discussion of goals of care,  symptom assessment, coordination of care and emotional support.      Signature: Hieu Blancas MD

## 2023-02-13 NOTE — CONSULTS
[facility] - Med Surg ([address])  Wound Care    Patient Name:  Dania Her   MRN:  [id number]  Date: 2/13/2023  Diagnosis: Debility    History:     Past Medical History:   Diagnosis Date    Arthritis     Diabetes mellitus     Hypertension        Social History     Socioeconomic History    Marital status:    Tobacco Use    Smoking status: Never       Precautions:     Allergies as of 02/11/2023    (No Known Allergies)       [facility] Assessment Details/Treatment     Wound care consult received for assessment of sacrum. Patient is a 82 year old fermale with past medical history of atrial fibrillation, HTN, IDDM, and pulmonary hypertension who presents for evaluation of increasing frequency of her standing intermittent chest pain over the past few to many days.    Upon assessment to sacrum noted pink scar tissue to left sacrum and shallow full thickness skin loss to right sacrum. Etiology like pressure complicated by moisture. Recommend Triad ointment to manage moisture and promote moist wound healing. Patient has intact skin with chronic discoloration to bilateral lower legs. Recommend moisturizing skin to prevent ulcer formation.     Nursing and MD team notified. Orders placed. Nursing to maintain pressure injury prevention interventions. Wound care to assist with pt prn.        02/13/23 0955        Altered Skin Integrity 02/11/23 2217 Sacral spine Full thickness tissue loss. Subcutaneous fat may be visible but bone, tendon or muscle are not exposed   Date First Assessed/Time First Assessed: 02/11/23 2217   Altered Skin Integrity Present on Admission: yes  Location: Sacral spine  Is this injury device related?: No  Description of Altered Skin Integrity: Full thickness tissue loss. Subcutaneous fat ...   Wound Image    Description of Altered Skin Integrity Full thickness tissue loss. Subcutaneous fat may be visible but bone, tendon or muscle are not exposed   Dressing Appearance Open to air   Drainage Amount Small    Drainage Characteristics/Odor Serous;No odor   Appearance Red;Yellow;Moist;Slough   Tissue loss description Full thickness   Periwound Area Moist;Scar tissue   Care Cleansed with:;Soap and water  (cleansing cloths)   Periwound Care Cleansed with pH balanced cleanser;Dry periwound area maintained       02/13/2023

## 2023-02-13 NOTE — ASSESSMENT & PLAN NOTE
-chronic, not well controlled at home  -HgA1C 8.1  -hold home glyburide  -LDSSI initiated  -dose/medication adjustment as appropriate   -monitor accuchecks AC/HS and PRN hypoglycemic protocol

## 2023-02-13 NOTE — ASSESSMENT & PLAN NOTE
-placed in observation 2/11 due to exacerbations of her chronic chest, back, and knee pain brought on by emotional distress at not being well cared for at home  -unlikely ACS  -troponin trend 0.028 >> 0.046 >> 0.028  -EKG with NSR and TWA/inferolateral changes that are seen on prior studies per Care Everywhere  -D-dimer 1.42 with negative CTA chest  -remains HDS (bouts of brief, self limiting AFib/SVT on tele (chronic in nature per Care Everywhere)  -continue home diltiazem, flecainide, furosemide, HCTZ, and lisinopril   -dose/medication adjustment as appropriate   -EKG PRN concerns  -monitor   -Cardiology and PCP follow up advised at [place]; follows with Cardiology at [facility]

## 2023-02-13 NOTE — ASSESSMENT & PLAN NOTE
Decreased functional mobility and endurance  Chronic chest wall pain  Cancer related pain  Bilateral primary osteoarthritis of knee  Chronic pain of both shoulders  -placed in observation 2/11 due to exacerbations of her chronic chest, back, and knee pain brought on by emotional distress at not being well cared for at home  -reports ongoing chronic pain that improves with PRN supportive care  -Palliative Care consulted, appreciate assistance  -PT/OT following  -PRN supportive care   -dose/medication adjustment as appropriate   -fall precautions  -monitor   -CM/SW following with plans for SNF and potential [facility] if appropriate long term

## 2023-02-13 NOTE — HPI
"Per H&P: "The patient is a 81 yo female with a past medical history of atrial fibrillation, HTN, IDDM, and pulmonary hypertension who presents for evaluation of increasing frequency of her standing intermittent chest pain over the past few to many days. She states the chest pain sometimes occurs with exertion and other times has no inciting factors. She endorses associated dyspnea with these episodes and reports worsening leg swelling. She also notes productive cough with clear mucus "when I have hot soup or pepper." She states she has had episodes of diaphoresis "and I get hot hot hot." She attributes this to her breast cancer related medication, denies any acute change, or relation to her episodes of chest pain.  Denies recent fever, chills, pain elsewhere, and other somatic complaints.  The patient has a mildly elevated troponin to .028, d-dimer 1.42.  She will be admitted for further management of her elevated troponin and Cardiology consult."    Palliative care consulted for assistance with ACP.   "

## 2023-02-13 NOTE — ASSESSMENT & PLAN NOTE
History of SVT  History of DVT of lower extremity  Long term current use of anticoagulants  -chronic, controlled mostly  -remains HDS (bouts of brief, self limiting AFib/SVT on tele (chronic in nature per Care Everywhere)  -continue home diltiazem, flecainide, and apixaban  -dose/medication adjustment as appropriate   -continue tele monitoring  -EKG PRN concerns   -Cardiology and PCP follow up advised at [place]; follows with Cardiology at [facility]

## 2023-02-13 NOTE — PLAN OF CARE
Problem: Physical Therapy  Goal: Physical Therapy Goal  Description: Goals to be met by: 3/15/2023    Patient will increase functional independence with mobility by performin. Transfer supine <> sitting EOB with minimal assist.   2. Be independent with supine home exercise program to maintain current mobility      Outcome: Ongoing, Progressing     Patient evaluated by PT and goals established. Patient reports living at home with  who is unable to assist her due to his own health issues. Patient has sitters come to home a few days a week but reports that has not been enough assistance recently. Patient reports her family is only able to  help minimally due to then working. Patient reports she has been bed bound for months now and uses leonel lift for transfers. Sitting EOB attempted but patient reports she needs pain medicine before being able to sit EOB due to stiffness. The remainder of session spent performing supine exercises, requiring maximal encouragement for participation. Discharge recommendation to SNF with transition to nursing home. Discharge DME includes hospital bed.  PT will continue to follow and progress as tolerated. Please see progress note for detailed plan of care and recommendations.

## 2023-02-13 NOTE — PROGRESS NOTES
"[facility] Surg [address] Medicine  Progress Note    Patient Name: Dania Her  MRN: [id number]  Patient Class: OP- Observation   Admission Date: 2/11/2023  Length of Stay: 0 days  Attending Physician: Ramin Kerr MD  Primary Care Provider: Primary Doctor No    Subjective:     Principal Problem:Debility    HPI:  Per admitting provider:  "The patient is a 81 yo female with a past medical history of atrial fibrillation, HTN, IDDM, and pulmonary hypertension who presents for evaluation of increasing frequency of her standing intermittent chest pain over the past few to many days. She states the chest pain sometimes occurs with exertion and other times has no inciting factors. She endorses associated dyspnea with these episodes and reports worsening leg swelling. She also notes productive cough with clear mucus "when I have hot soup or pepper." She states she has had episodes of diaphoresis "and I get hot hot hot." She attributes this to her breast cancer related medication, denies any acute change, or relation to her episodes of chest pain.  Denies recent fever, chills, pain elsewhere, and other somatic complaints.  The patient has a mildly elevated troponin to .028, d-dimer 1.42.  She will be admitted for further management of her elevated troponin and Cardiology consult."    History clarified with the patient, she reports she is been slowly becoming more debilitated and for the last 2 months she is been unable to walk.  Therefore, she turned the living room into her bedroom as she is unable to go up the stairs.  She lives at home with her 83-year-old  who also has multiple medical issues.  He is unable to provide proper care for her and her children her only able to stop by every now and then.  She has a sitter who comes only 3 days per week.  The rest of the time, she has no one to change her or turn her.  She said it has become increasingly clear to her that she may need to move " into a nursing facility.  The reason she came to the hospital yesterday because her daughter decided to have a birthday party in the living room which is her bedroom.  She was upset about all the people invading her personal space and because she got upset all of her chronic aches and pains were exacerbated.  The chest pain she reported is the same pain she has had on and off since her mastectomy, she also reported back pain and knee pain.  She denied any associated nausea, jaw pain, arm pain, diaphoresis, or shortness of breath.    Overview/Hospital Course:  Ms. Her was placed in observation 2/11 due to exacerbations of her chronic chest, back, and knee pain brought on by emotional distress at not being well cared for at home. Troponin trend 0.028 >> 0.046 >> 0.028; EKG with NSR and TWA/inferolateral changes that are seen on prior studies per Care Everywhere. D-dimer 1.42 with negative CTA chest. Has remained HDS (bouts of brief, self limiting AFib/SVT on tele, chronic in nature per Care Everywhere) and afebrile over course, reports ongoing chronic pain that improves with PRN supportive care. Palliative Care consulted, appreciate assistance. PT/OT following.     Disposition plans: CM/SW following with plans for SNF and potential long term NH conversion if appropriate long term.  Cardiology and PCP follow up advised at [place]; follows with Cardiology at [facility].       Interval History: Resting in bed, reports ongoing bilateral shoulder and knee pain that is somewhat improved with PRNs. She denies cardiac chest pain or associated symptoms. She is interested in SNF placement.     Review of Systems   Constitutional:  Positive for activity change and fatigue. Negative for appetite change, chills and diaphoresis.   HENT:  Negative for sore throat and trouble swallowing.    Respiratory:  Negative for cough, chest tightness, shortness of breath and wheezing.    Cardiovascular:  Negative for chest pain, palpitations and leg  swelling.   Gastrointestinal:  Positive for constipation (chronic). Negative for abdominal pain, diarrhea, nausea and vomiting.   Genitourinary:  Negative for decreased urine volume, difficulty urinating, dysuria and urgency.   Musculoskeletal:  Positive for arthralgias and gait problem. Negative for myalgias.   Skin: Negative.    Neurological:  Positive for weakness. Negative for dizziness, syncope, light-headedness and headaches.     Objective:     Vital Signs (Most Recent):  Temp: 99.3 °F (37.4 °C) (02/12/23 1211)  Pulse: 77 (02/12/23 1211)  Resp: 18 (02/12/23 1211)  BP: (!) 126/91 (02/12/23 1211)  SpO2: 99 % (02/12/23 1211)   Vital Signs (24h Range):  Temp:  [98.2 °F (36.8 °C)-99.3 °F (37.4 °C)] 99.3 °F (37.4 °C)  Pulse:  [51-78] 77  Resp:  [16-20] 18  SpO2:  [92 %-100 %] 99 %  BP: (114-148)/(56-91) 126/91        Body mass index is 45.76 kg/m².    Intake/Output Summary (Last 24 hours) at 2/13/2023 1309  Last data filed at 2/13/2023 0457  Gross per 24 hour   Intake --   Output 1001 ml   Net -1001 ml      Physical Exam  Vitals and nursing note reviewed.   Constitutional:       General: She is not in acute distress.     Appearance: Normal appearance. She is obese. She is not toxic-appearing.   Eyes:      Extraocular Movements: Extraocular movements intact.      Conjunctiva/sclera: Conjunctivae normal.   Cardiovascular:      Rate and Rhythm: Normal rate and regular rhythm.      Heart sounds: Normal heart sounds.   Pulmonary:      Effort: Pulmonary effort is normal.      Breath sounds: Normal breath sounds.   Abdominal:      General: Bowel sounds are normal. There is no distension.      Palpations: Abdomen is soft.   Musculoskeletal:      Cervical back: Neck supple.      Right lower leg: No edema.      Left lower leg: No edema.   Skin:     General: Skin is warm and dry.   Neurological:      General: No focal deficit present.      Mental Status: She is alert. Mental status is at baseline.     Significant Labs: All  pertinent labs within the past 24 hours have been reviewed.  CBC:   Recent Labs   Lab 02/11/23 2210   WBC 5.84   HGB 13.0   HCT 40.1   *     CMP:   Recent Labs   Lab 02/11/23  2210 02/13/23  0412 02/13/23  1224    137 137   K 5.3* 3.9 4.1   CL 97 96 95   CO2 30* 36* 34*   * 161* 271*   BUN 14 13 14   CREATININE 0.8 0.8 0.8   CALCIUM 10.8* 11.0* 10.8*   PROT 8.7*  --   --    ALBUMIN 3.1*  --  3.0*   BILITOT 0.5  --   --    ALKPHOS 89  --   --    AST 27  --   --    ALT 16  --   --    ANIONGAP 9 5* 8     Significant Imaging: I have reviewed all pertinent imaging results/findings within the past 24 hours.    Assessment/Plan:      * Debility  Decreased functional mobility and endurance  Chronic chest wall pain  Cancer related pain  Bilateral primary osteoarthritis of knee  Chronic pain of both shoulders  -placed in observation 2/11 due to exacerbations of her chronic chest, back, and knee pain brought on by emotional distress at not being well cared for at home  -reports ongoing chronic pain that improves with PRN supportive care  -Palliative Care consulted, appreciate assistance  -PT/OT following  -PRN supportive care   -dose/medication adjustment as appropriate   -fall precautions  -monitor   -CM/SW following with plans for SNF and potential [facility] NH conversion if appropriate long term    Elevated troponin  -placed in observation 2/11 due to exacerbations of her chronic chest, back, and knee pain brought on by emotional distress at not being well cared for at home  -unlikely ACS  -troponin trend 0.028 >> 0.046 >> 0.028  -EKG with NSR and TWA/inferolateral changes that are seen on prior studies per Care Everywhere  -D-dimer 1.42 with negative CTA chest  -remains HDS (bouts of brief, self limiting AFib/SVT on tele (chronic in nature per Care Everywhere)  -continue home diltiazem, flecainide, furosemide, HCTZ, and lisinopril   -dose/medication adjustment as appropriate   -EKG PRN  concerns  -monitor   -Cardiology and PCP follow up advised at [place]; follows with Cardiology at [facility]    Atrial fibrillation  History of SVT  History of DVT of lower extremity  Long term current use of anticoagulants  -chronic, controlled mostly  -remains HDS (bouts of brief, self limiting AFib/SVT on tele (chronic in nature per Care Everywhere)  -continue home diltiazem, flecainide, and apixaban  -dose/medication adjustment as appropriate   -continue tele monitoring  -EKG PRN concerns   -Cardiology and PCP follow up advised at [place]; follows with Cardiology at [facility]    Primary hypertension  -chronic, controlled  -continue home diltiazem, flecainide, furosemide, HCTZ, and lisinopril   -dose/medication adjustment as appropriate   -monitor     Type 2 diabetes mellitus, with long-term current use of insulin  -chronic, not well controlled at home  -HgA1C 8.1  -hold home glyburide  -LDSSI initiated  -dose/medication adjustment as appropriate   -monitor accuchecks AC/HS and PRN hypoglycemic protocol     Breast cancer  -chronic, s/p mastectomy with chronic chest wall pain  -continue home anastrozole      VTE Risk Mitigation (From admission, onward)         Ordered     apixaban tablet 5 mg  2 times daily         02/12/23 1207     IP VTE HIGH RISK PATIENT  Once         02/12/23 0256     Place sequential compression device  Until discontinued         02/12/23 0256              Discharge Planning   [place]:      Code Status: Full Code   Is the patient medically ready for discharge?:     Reason for patient still in hospital (select all that apply): Patient trending condition, PT / OT recommendations and Pending disposition  Discharge Plan A: Home with family, Home Health        Kassie Navarro, SHERI, AG-ACNP, BC  Department of Hospital Medicine  [facility]

## 2023-02-13 NOTE — PT/OT/SLP EVAL
Physical Therapy Evaluation    Patient Name:  Dania Her   MRN:  [id number]    Recommendations:     Discharge Recommendations: nursing facility, skilled (with transition to nursing home)   Discharge Equipment Recommendations: hospital bed   Barriers to discharge: Decreased caregiver support and current functional status    Assessment:     Dania Her is a 82 y.o. female admitted with a medical diagnosis of Debility.  She presents with the following impairments/functional limitations: weakness, impaired endurance, impaired self care skills, impaired functional mobility, impaired balance, decreased lower extremity function, decreased upper extremity function, pain, impaired cardiopulmonary response to activity .    Patient evaluated by PT and goals established. Patient reports living at home with  who is unable to assist her due to his own health issues. Patient has sitters come to home a few days a week but reports that has not been enough assistance recently. Patient reports her family is only able to  help minimally due to then working. Patient reports she has been bed bound for months now and uses leonel lift for transfers. Sitting EOB attempted but patient reports she needs pain medicine before being able to sit EOB due to stiffness. The remainder of session spent performing supine exercises, requiring maximal encouragement for participation. Discharge recommendation to SNF with transition to nursing home. Discharge DME includes hospital bed.  PT will continue to follow and progress as tolerated. Please see progress note for detailed plan of care and recommendations.    Rehab Prognosis: Fair; patient would benefit from acute skilled PT services to address these deficits and reach maximum level of function.    Recent Surgery: * No surgery found *      Plan:     During this hospitalization, patient to be seen 3 x/week to address the identified rehab impairments via gait training, therapeutic activities,  "therapeutic exercises and progress toward the following goals:    Plan of Care Expires:  03/15/23    Subjective     Chief Complaint: "I am just so stiff"   Patient/Family Comments/goals: Patient agrees to participate in PT intervention.   Pain/Comfort:  Pain Rating 1:  (Patient reports pain and stiffness in knees, shoulders and legs. Patient does not provide pain rating.)  Pain Addressed 1: Reposition, Distraction, Cessation of Activity, Nurse notified    Patients cultural, spiritual, Amish conflicts given the current situation: no    Living Environment:  Patient lives with  who is unable to assist the patient due to his own medical conditions. The patient reports her daughters are able to stop by every once and a while but not often due to them working full time jobs and having families. The patient reports she has sitters come a few times a week but recently it has not been enough assistance for her. She reports she would like to improve her mobility but she thinks she needs more assistance. No steps to enter home.   Prior to admission, patients level of function was dependent for transfers using leonel lift.  Equipment used at home: wheelchair, lift device.  DME owned (not currently used): none.  Upon discharge, patient will have assistance from daughters and sitters.    Objective:     Communicated with nursing prior to session.  Patient found supine with bed alarm, peripheral IV  upon PT entry to room.    General Precautions: Standard, fall  Orthopedic Precautions:N/A   Braces: N/A  Respiratory Status: Room air    Exams:  Cognition:   Patient is oriented to person, place, time and situation.  Pt follows approximately 70% of  verbal commands.    Mood: Pleasant and cooperative.   Safety Awareness: fair   Musculoskeletal:  BMI: 45  Posture:  slouched shoulders and forward head posture   LE ROM/Strength:   R ROM: WNL  L ROM: WNL  R Strength:   Knee extension: 2/5  Dorsiflexion: 2/5   L Strength:   Knee " extension: 2/5  Dorsiflexion: 2/5   Neuromuscular:  Sensation: Intact to light touch bilateral LEs.   Tone/Reflexes: No impairments identified with functional mobility. No formal testing performed.   Balance:   Static sitting: Unable to assess  Static standing: Unable to assess  Dynamic standing: Unable to assess  Visual-vestibular: No impairments identified with functional mobility. No formal testing performed.  Integument: Visible skin intact  Cardiopulmonary:  Edema: moderate edema noted in BLE      Functional Mobility:  Bed Mobility:     Scooting: total assistance and of two  persons  Supine to Sit: attempted but discontinued due to patient requiring total assist to move BLE and reports sidnigicant leg pain and stiffness and requesting pain medication. Nurse contacted and notified.       AM-PAC 6 CLICK MOBILITY  Total Score:7       Treatment & Education:  Patient educated on the POC, purpose of PT and discharge planning.     Patient extensively educated on the importance of maintaining mobility independently in bed and performing supine exercises on her own to prevent further deconditioning.   The following supine exercises were performed with maximal encouragement along with verbal and tactile cues:   Ankle pumps x 20 BLE with decreased motion noted of L ankle  Glute sets x 10 with three second holds  Quad sets attempted but patient reports she is unable to perform and no muscle activation noted with attempts    Patient left supine with all lines intact and call button in reach.    GOALS:   Multidisciplinary Problems       Physical Therapy Goals          Problem: Physical Therapy    Goal Priority Disciplines Outcome Goal Variances Interventions   Physical Therapy Goal     PT, PT/OT Ongoing, Progressing     Description: Goals to be met by: 3/15/2023    Patient will increase functional independence with mobility by performin. Transfer supine <> sitting EOB with minimal assist.   2. Be independent with  supine home exercise program to maintain current mobility                           History:     Past Medical History:   Diagnosis Date    Arthritis     Diabetes mellitus     Hypertension        Past Surgical History:   Procedure Laterality Date     SECTION      OOPHORECTOMY         Time Tracking:     PT Received On: 23  PT Start Time: 903     PT Stop Time: 928  PT Total Time (min): 25 min     Billable Minutes: Evaluation 15 and Therapeutic Exercise 10      2023

## 2023-02-13 NOTE — ASSESSMENT & PLAN NOTE
-chronic, controlled  -continue home diltiazem, flecainide, furosemide, HCTZ, and lisinopril   -dose/medication adjustment as appropriate   -monitor

## 2023-02-13 NOTE — SUBJECTIVE & OBJECTIVE
Interval History/subjective:  - patient seen at bedside with palliative RN Amelie  - patient open to us talking with her  - she had just finished eating food   - denies any discomfort or concerns at the moment    Past Medical History:   Diagnosis Date    Arthritis     Diabetes mellitus     Hypertension        Past Surgical History:   Procedure Laterality Date     SECTION      OOPHORECTOMY         Review of patient's allergies indicates:  No Known Allergies    Medications:  Continuous Infusions:  Scheduled Meds:   anastrozole  1 mg Oral Daily    apixaban  5 mg Oral BID    atorvastatin  80 mg Oral Daily    diltiaZEM  120 mg Oral Daily    EScitalopram oxalate  10 mg Oral Daily    flecainide  100 mg Oral Q12H    furosemide  20 mg Oral BID    hydroCHLOROthiazide  25 mg Oral Daily    lisinopriL  2.5 mg Oral Daily    miconazole NITRATE 2 %   Topical (Top) BID    [START ON 2023] polyethylene glycol  17 g Oral Daily    senna-docusate 8.6-50 mg  1 tablet Oral BID    traZODone  50 mg Oral QHS     PRN Meds:acetaminophen, bisacodyL, dextrose 10%, dextrose 10%, glucagon (human recombinant), glucose, glucose, HYDROmorphone, insulin aspart U-100, metoprolol, oxyCODONE-acetaminophen, pantoprazole, sodium chloride 0.9%, tiZANidine    Family History    None       Tobacco Use    Smoking status: Never    Smokeless tobacco: Not on file   Substance and Sexual Activity    Alcohol use: Not on file    Drug use: Not on file    Sexual activity: Not on file     ROS; per hpi/subjective    Objective:     Vital Signs (Most Recent):  Temp: 98 °F (36.7 °C) (23 1244)  Pulse: 76 (23 1244)  Resp: 18 (23 1244)  BP: 103/61 (23 1244)  SpO2: 98 % (23 1244) Vital Signs (24h Range):  Temp:  [97.5 °F (36.4 °C)-98.4 °F (36.9 °C)] 98 °F (36.7 °C)  Pulse:  [] 76  Resp:  [18-20] 18  SpO2:  [91 %-99 %] 98 %  BP: (103-153)/(61-73) 103/61     Weight: 124.7 kg (275 lb)  Body mass index is 45.76 kg/m².    Physical  Exam  Constitutional:       General: She is not in acute distress.     Appearance: She is obese. She is not ill-appearing.   Eyes:      Extraocular Movements: Extraocular movements intact.   Pulmonary:      Effort: Pulmonary effort is normal. No respiratory distress.   Skin:     General: Skin is warm.   Neurological:      Mental Status: She is alert.      Comments: Awake, alert, has insight, conversational       Review of Symptoms        Living Arrangements:  Lives with family    Psychosocial/Cultural:   See Palliative Psychosocial Note: Yes  - she was living at home with her 83 y.o  who has his own co-morbidities (dialysis) and with a daughter  - she has a son and 2 daughters and has 3 grandchildren  - has not walked for over a month  - difficulties caring for herself at home  **Primary  to Follow**  Palliative Care  Consult: No      Advance Care Planning   Advance Directives:     Decision Making:  Patient answered questions  Goals of Care: The patient endorses that what is most important right now is to focus on remaining as independent as possible and improvement in condition.    Accordingly, we have decided that the best plan to meet the patient's goals includes continuing with treatment       Significant Labs: Reviewed  CBC:   Recent Labs   Lab 02/11/23  2210   WBC 5.84   HGB 13.0   HCT 40.1   *   *     BMP:  Recent Labs   Lab 02/13/23  0412 02/13/23  1224   * 271*    137   K 3.9 4.1   CL 96 95   CO2 36* 34*   BUN 13 14   CREATININE 0.8 0.8   CALCIUM 11.0* 10.8*   MG 2.1  --      LFT:  Lab Results   Component Value Date    AST 27 02/11/2023    ALKPHOS 89 02/11/2023    BILITOT 0.5 02/11/2023     Albumin:   Albumin   Date Value Ref Range Status   02/13/2023 3.0 (L) 3.5 - 5.2 g/dL Final     Protein:   Total Protein   Date Value Ref Range Status   02/11/2023 8.7 (H) 6.0 - 8.4 g/dL Final     Lactic acid:   Lab Results   Component Value Date    LACTATE 2.1  02/13/2023       Significant Imaging: Reviewed

## 2023-02-14 PROBLEM — R82.81 PYURIA: Status: ACTIVE | Noted: 2023-02-14

## 2023-02-14 LAB
ANION GAP SERPL CALC-SCNC: 7 MMOL/L (ref 8–16)
BACTERIA UR CULT: ABNORMAL
BASOPHILS # BLD AUTO: 0.03 K/UL (ref 0–0.2)
BASOPHILS NFR BLD: 0.5 % (ref 0–1.9)
BUN SERPL-MCNC: 12 MG/DL (ref 8–23)
CALCIUM SERPL-MCNC: 10.8 MG/DL (ref 8.7–10.5)
CHLORIDE SERPL-SCNC: 95 MMOL/L (ref 95–110)
CO2 SERPL-SCNC: 35 MMOL/L (ref 23–29)
CREAT SERPL-MCNC: 0.7 MG/DL (ref 0.5–1.4)
DIFFERENTIAL METHOD: ABNORMAL
EOSINOPHIL # BLD AUTO: 0.2 K/UL (ref 0–0.5)
EOSINOPHIL NFR BLD: 2.9 % (ref 0–8)
ERYTHROCYTE [DISTWIDTH] IN BLOOD BY AUTOMATED COUNT: 14.8 % (ref 11.5–14.5)
EST. GFR  (NO RACE VARIABLE): >60 ML/MIN/1.73 M^2
GLUCOSE SERPL-MCNC: 178 MG/DL (ref 70–110)
HCT VFR BLD AUTO: 38 % (ref 37–48.5)
HGB BLD-MCNC: 12.2 G/DL (ref 12–16)
IMM GRANULOCYTES # BLD AUTO: 0.03 K/UL (ref 0–0.04)
IMM GRANULOCYTES NFR BLD AUTO: 0.5 % (ref 0–0.5)
LYMPHOCYTES # BLD AUTO: 2 K/UL (ref 1–4.8)
LYMPHOCYTES NFR BLD: 33.7 % (ref 18–48)
MAGNESIUM SERPL-MCNC: 1.8 MG/DL (ref 1.6–2.6)
MCH RBC QN AUTO: 32.5 PG (ref 27–31)
MCHC RBC AUTO-ENTMCNC: 32.1 G/DL (ref 32–36)
MCV RBC AUTO: 101 FL (ref 82–98)
MONOCYTES # BLD AUTO: 0.6 K/UL (ref 0.3–1)
MONOCYTES NFR BLD: 9.6 % (ref 4–15)
NEUTROPHILS # BLD AUTO: 3.1 K/UL (ref 1.8–7.7)
NEUTROPHILS NFR BLD: 52.8 % (ref 38–73)
NRBC BLD-RTO: 0 /100 WBC
PLATELET # BLD AUTO: 119 K/UL (ref 150–450)
PLATELET BLD QL SMEAR: ABNORMAL
PMV BLD AUTO: 10.9 FL (ref 9.2–12.9)
POCT GLUCOSE: 151 MG/DL (ref 70–110)
POCT GLUCOSE: 182 MG/DL (ref 70–110)
POCT GLUCOSE: 188 MG/DL (ref 70–110)
POCT GLUCOSE: 218 MG/DL (ref 70–110)
POCT GLUCOSE: 264 MG/DL (ref 70–110)
POTASSIUM SERPL-SCNC: 3.9 MMOL/L (ref 3.5–5.1)
RBC # BLD AUTO: 3.75 M/UL (ref 4–5.4)
SODIUM SERPL-SCNC: 137 MMOL/L (ref 136–145)
WBC # BLD AUTO: 5.93 K/UL (ref 3.9–12.7)

## 2023-02-14 PROCEDURE — 99222 PR INITIAL HOSPITAL CARE,LEVL II: ICD-10-PCS | Mod: ,,, | Performed by: INTERNAL MEDICINE

## 2023-02-14 PROCEDURE — 99233 PR SUBSEQUENT HOSPITAL CARE,LEVL III: ICD-10-PCS | Mod: ,,, | Performed by: PHYSICIAN ASSISTANT

## 2023-02-14 PROCEDURE — G0378 HOSPITAL OBSERVATION PER HR: HCPCS

## 2023-02-14 PROCEDURE — 25000003 PHARM REV CODE 250: Performed by: NURSE PRACTITIONER

## 2023-02-14 PROCEDURE — 93005 ELECTROCARDIOGRAM TRACING: CPT

## 2023-02-14 PROCEDURE — 99222 1ST HOSP IP/OBS MODERATE 55: CPT | Mod: ,,, | Performed by: INTERNAL MEDICINE

## 2023-02-14 PROCEDURE — 97530 THERAPEUTIC ACTIVITIES: CPT

## 2023-02-14 PROCEDURE — 93010 EKG 12-LEAD: ICD-10-PCS | Mod: ,,, | Performed by: INTERNAL MEDICINE

## 2023-02-14 PROCEDURE — 93010 ELECTROCARDIOGRAM REPORT: CPT | Mod: ,,, | Performed by: INTERNAL MEDICINE

## 2023-02-14 PROCEDURE — 80048 BASIC METABOLIC PNL TOTAL CA: CPT | Performed by: NURSE PRACTITIONER

## 2023-02-14 PROCEDURE — 99900035 HC TECH TIME PER 15 MIN (STAT)

## 2023-02-14 PROCEDURE — 83735 ASSAY OF MAGNESIUM: CPT | Performed by: NURSE PRACTITIONER

## 2023-02-14 PROCEDURE — 99233 SBSQ HOSP IP/OBS HIGH 50: CPT | Mod: ,,, | Performed by: PHYSICIAN ASSISTANT

## 2023-02-14 PROCEDURE — 85025 COMPLETE CBC W/AUTO DIFF WBC: CPT | Performed by: NURSE PRACTITIONER

## 2023-02-14 PROCEDURE — 25000003 PHARM REV CODE 250: Performed by: PHYSICIAN ASSISTANT

## 2023-02-14 PROCEDURE — 94761 N-INVAS EAR/PLS OXIMETRY MLT: CPT

## 2023-02-14 PROCEDURE — 36415 COLL VENOUS BLD VENIPUNCTURE: CPT | Performed by: NURSE PRACTITIONER

## 2023-02-14 PROCEDURE — 97535 SELF CARE MNGMENT TRAINING: CPT

## 2023-02-14 RX ORDER — DICLOFENAC SODIUM 10 MG/G
4 GEL TOPICAL DAILY
Status: DISCONTINUED | OUTPATIENT
Start: 2023-02-14 | End: 2023-02-23 | Stop reason: HOSPADM

## 2023-02-14 RX ADMIN — ANASTROZOLE 1 MG: 1 TABLET, COATED ORAL at 09:02

## 2023-02-14 RX ADMIN — FUROSEMIDE 20 MG: 20 TABLET ORAL at 08:02

## 2023-02-14 RX ADMIN — DICLOFENAC 4 G: 10 GEL TOPICAL at 12:02

## 2023-02-14 RX ADMIN — APIXABAN 5 MG: 2.5 TABLET, FILM COATED ORAL at 09:02

## 2023-02-14 RX ADMIN — MICONAZOLE NITRATE: 20 POWDER TOPICAL at 08:02

## 2023-02-14 RX ADMIN — OXYCODONE AND ACETAMINOPHEN 1 TABLET: 7.5; 325 TABLET ORAL at 10:02

## 2023-02-14 RX ADMIN — APIXABAN 5 MG: 2.5 TABLET, FILM COATED ORAL at 08:02

## 2023-02-14 RX ADMIN — FLECAINIDE ACETATE 100 MG: 50 TABLET ORAL at 08:02

## 2023-02-14 RX ADMIN — ATORVASTATIN CALCIUM 80 MG: 20 TABLET, FILM COATED ORAL at 09:02

## 2023-02-14 RX ADMIN — DILTIAZEM HYDROCHLORIDE 120 MG: 120 CAPSULE, COATED, EXTENDED RELEASE ORAL at 09:02

## 2023-02-14 RX ADMIN — ESCITALOPRAM OXALATE 10 MG: 10 TABLET ORAL at 09:02

## 2023-02-14 RX ADMIN — LISINOPRIL 2.5 MG: 2.5 TABLET ORAL at 09:02

## 2023-02-14 RX ADMIN — SENNOSIDES AND DOCUSATE SODIUM 1 TABLET: 50; 8.6 TABLET ORAL at 08:02

## 2023-02-14 RX ADMIN — SENNOSIDES AND DOCUSATE SODIUM 1 TABLET: 50; 8.6 TABLET ORAL at 09:02

## 2023-02-14 RX ADMIN — FUROSEMIDE 20 MG: 20 TABLET ORAL at 09:02

## 2023-02-14 RX ADMIN — POLYETHYLENE GLYCOL 3350 17 G: 17 POWDER, FOR SOLUTION ORAL at 09:02

## 2023-02-14 RX ADMIN — MICONAZOLE NITRATE: 20 POWDER TOPICAL at 09:02

## 2023-02-14 RX ADMIN — FLECAINIDE ACETATE 100 MG: 50 TABLET ORAL at 09:02

## 2023-02-14 RX ADMIN — HYDROCHLOROTHIAZIDE 25 MG: 25 TABLET ORAL at 09:02

## 2023-02-14 RX ADMIN — TRAZODONE HYDROCHLORIDE 50 MG: 50 TABLET ORAL at 08:02

## 2023-02-14 RX ADMIN — PANTOPRAZOLE SODIUM 40 MG: 40 TABLET, DELAYED RELEASE ORAL at 10:02

## 2023-02-14 NOTE — PROGRESS NOTES
1335-Notified DOLLY Silvestre that the monitor tech called stating that the pt had a few 6 sec pauses. Cardiology consult ordered and called.  1406-Monitor tech call stating pt was in SVT but not sustained. HR was in the 130s. DOLLY Silvestre notified.  1536- Monitor tech called stating pt angeline(50s)w/ 1st degree heart block. Notified DOLLY Marin. STAT EKG ordered. Respiratory called. Will continue to monitor.

## 2023-02-14 NOTE — PT/OT/SLP PROGRESS
Occupational Therapy   Treatment    Name: Dania Her  MRN: [id number]  Admitting Diagnosis:  Debility       Recommendations:     Discharge Recommendations: nursing facility, skilled, other (see comments) (with transition to nursing home)  Discharge Equipment Recommendations:  hospital bed  Barriers to discharge:  Decreased caregiver support    Assessment:     Dania Her is a 82 y.o. female with a medical diagnosis of Debility.  She presents with weakness, impaired endurance, impaired self care skills, impaired functional mobility, gait instability, impaired balance, decreased coordination, decreased upper extremity function, decreased lower extremity function, decreased safety awareness, pain, impaired coordination, impaired cardiopulmonary response to activity.     Rehab Prognosis:  Good; patient would benefit from acute skilled OT services to address these deficits and reach maximum level of function.       Plan:     Patient to be seen 4 x/week to address the above listed problems via self-care/home management, therapeutic activities, therapeutic exercises  Plan of Care Expires: 02/26/23  Plan of Care Reviewed with: patient    Subjective     Pain/Comfort:  Pain Rating 1: other (see comments) (unrated stiffness to BLEs)  Pain Addressed 1: Reposition, Distraction, Cessation of Activity    Objective:     Communicated with: RN prior to session.  Patient found HOB elevated with peripheral IV, telemetry, bed alarm, oxygen upon OT entry to room.    General Precautions: Standard, fall    Orthopedic Precautions:N/A  Braces: N/A  Respiratory Status: Nasal cannula, flow 2 L/min     Occupational Performance:     Bed Mobility:    Supine <> sitting: max-total A x2. Pt initiated LLE to EOB. Pt with difficulty pushing up from side lying to sitting, requiring total A x2. Pt verbalized motivation to sit EOB and progress OOB activity. Pt verbalized goal to ambulate again. OT to progress to BSC transfers.   Rolling R/L: total  A x2-3.   Scooting: total A x2-3  Pt endorsed dizziness in supine and while seated EOB. Pt educated on benefits of continued OOB activity to improve activity tolerance and endurance during upright ADLs. Pt verbalized understanding.     Functional Mobility/Transfers:  EOB to progress, pt tolerated approx 12 minutes of EOB activity with SBA for sitting balance, fatiguing to min A for upright sitting due to R leaning.   Functional EOB activity performed to improve activity tolerance for increased endurance and independence with ADLs as well as functional balance retraining for fall prevention.      Activities of Daily Living:  Feeding: SBA and set up for feeding while seated EOB  Toileting: max-total A to change pads in supine while rolling     Pt educated on BUE HEP to perform throughout the day. Pt verbalized understanding.     [facility] 6 Click ADL: 14    Treatment & Education:  OT role, plan of care, progression of goals, importance of continued OOB activity, ADL/functional transfer and mobility retraining, discharge recommendation, call don't fall, safety precautions, fall prevention.     Patient left HOB elevated with all lines intact, call button in reach, bed alarm on, and RN notified    GOALS:   Multidisciplinary Problems       Occupational Therapy Goals          Problem: Occupational Therapy    Goal Priority Disciplines Outcome Interventions   Occupational Therapy Goal     OT, PT/OT Ongoing, Progressing    Description: Goals to be met by: 2/26/2023     Patient will increase functional independence with ADLs by performing:    Grooming while EOB with Supervision and Set Up Assistance.  Sitting at edge of bed x15 minutes with Supervision.  Rolling to Bilateral with Moderate Assistance.   Supine to sit with Maximum Assistance x1.                         Time Tracking:     OT Date of Treatment: 02/14/23  OT Start Time: 0832  OT Stop Time: 0855  OT Total Time (min): 23 min    Billable Minutes:Self Care/Home Management  23 minutes    Co treament performed due to patient's multiple deficits requiring two skilled therapists to safely assess patient's ability to complete ADLs and functional mobility in addition to accommodating for patient's activity tolerance while in acute care setting.      OT/BRYANT: OT          2/14/2023

## 2023-02-14 NOTE — PROGRESS NOTES
"[facility] Surg [address] Medicine  Progress Note    Patient Name: Dania Her  MRN: [id number]  Patient Class: OP- Observation   Admission Date: 2/11/2023  Length of Stay: 0 days  Attending Physician: Enoch Sim MD  Primary Care Provider: Primary Doctor No        Subjective:     Principal Problem:Debility        HPI:  Per admitting provider:  "The patient is a 81 yo female with a past medical history of atrial fibrillation, HTN, IDDM, and pulmonary hypertension who presents for evaluation of increasing frequency of her standing intermittent chest pain over the past few to many days. She states the chest pain sometimes occurs with exertion and other times has no inciting factors. She endorses associated dyspnea with these episodes and reports worsening leg swelling. She also notes productive cough with clear mucus "when I have hot soup or pepper." She states she has had episodes of diaphoresis "and I get hot hot hot." She attributes this to her breast cancer related medication, denies any acute change, or relation to her episodes of chest pain.  Denies recent fever, chills, pain elsewhere, and other somatic complaints.  The patient has a mildly elevated troponin to .028, d-dimer 1.42.  She will be admitted for further management of her elevated troponin and Cardiology consult."    History clarified with the patient, she reports she is been slowly becoming more debilitated and for the last 2 months she is been unable to walk.  Therefore, she turned the living room into her bedroom as she is unable to go up the stairs.  She lives at home with her 83-year-old  who also has multiple medical issues.  He is unable to provide proper care for her and her children her only able to stop by every now and then.  She has a sitter who comes only 3 days per week.  The rest of the time, she has no one to change her or turn her.  She said it has become increasingly clear to her that she may need to " move into a nursing facility.  The reason she came to the hospital yesterday because her daughter decided to have a birthday party in the living room which is her bedroom.  She was upset about all the people invading her personal space and because she got upset all of her chronic aches and pains were exacerbated.  The chest pain she reported is the same pain she has had on and off since her mastectomy, she also reported back pain and knee pain.  She denied any associated nausea, jaw pain, arm pain, diaphoresis, or shortness of breath.      Overview/Hospital Course:  Ms. Her was placed in observation 2/11 due to exacerbations of her chronic chest, back, and knee pain brought on by emotional distress at not being well cared for at home. Troponin trend 0.028 >> 0.046 >> 0.028; EKG with NSR and TWA/inferolateral changes that are seen on prior studies per Care Everywhere. D-dimer 1.42 with negative CTA chest. Has remained HDS (bouts of brief, self limiting AFib/SVT on tele, chronic in nature per Care Everywhere) and afebrile over course, reports ongoing chronic pain that improves with PRN supportive care. Palliative Care consulted, appreciate assistance. PT/OT following.     Disposition plans: CM/SW following with plans for SNF and potential intermediate NH conversion if appropriate long term.  Cardiology and PCP follow up advised at [place]; follows with Cardiology at [facility].       Interval History: Seen at bedside, resting. In good spirits, eager to get to SNF. Trial voltaren gel for her knees. Her UCX shows ESBL E coli however she denies urinary symptoms, will not treat    Review of Systems   Constitutional:  Positive for activity change and fatigue.   Respiratory:  Negative for cough and shortness of breath.    Cardiovascular:  Negative for chest pain.   Gastrointestinal:  Positive for constipation (chronic). Negative for abdominal pain, nausea and vomiting.   Genitourinary:  Negative for difficulty urinating and dysuria.    Musculoskeletal:  Positive for arthralgias and gait problem.   Neurological:  Positive for weakness. Negative for dizziness and headaches.   Objective:     Vital Signs (Most Recent):  Temp: 98.1 °F (36.7 °C) (02/14/23 0729)  Pulse: 80 (02/14/23 0959)  Resp: 18 (02/14/23 1047)  BP: (!) 120/53 (02/14/23 0729)  SpO2: 100 % (02/14/23 0729)   Vital Signs (24h Range):  Temp:  [97.5 °F (36.4 °C)-99.2 °F (37.3 °C)] 98.1 °F (36.7 °C)  Pulse:  [] 80  Resp:  [15-18] 18  SpO2:  [96 %-100 %] 100 %  BP: (103-154)/(53-86) 120/53     Weight: 129.3 kg (285 lb 0.9 oz)  Body mass index is 47.44 kg/m².    Intake/Output Summary (Last 24 hours) at 2/14/2023 1112  Last data filed at 2/14/2023 0610  Gross per 24 hour   Intake 240 ml   Output 1600 ml   Net -1360 ml      Physical Exam  Vitals and nursing note reviewed.   Constitutional:       General: She is not in acute distress.     Appearance: Normal appearance. She is not ill-appearing.   Cardiovascular:      Rate and Rhythm: Normal rate and regular rhythm.   Pulmonary:      Effort: Pulmonary effort is normal. No respiratory distress.   Abdominal:      General: Abdomen is flat. There is no distension.      Palpations: Abdomen is soft.   Musculoskeletal:      Cervical back: Neck supple.      Right lower leg: No edema.      Left lower leg: No edema.   Skin:     General: Skin is warm and dry.   Neurological:      General: No focal deficit present.      Mental Status: She is alert.   Psychiatric:         Mood and Affect: Mood normal.         Behavior: Behavior normal.       Significant Labs: All pertinent labs within the past 24 hours have been reviewed.    Significant Imaging: I have reviewed all pertinent imaging results/findings within the past 24 hours.      Assessment/Plan:      * Debility  Decreased functional mobility and endurance  Chronic chest wall pain  Cancer related pain  Bilateral primary osteoarthritis of knee  Chronic pain of both shoulders  -placed in observation  2/11 due to exacerbations of her chronic chest, back, and knee pain brought on by emotional distress at not being well cared for at home  -reports ongoing chronic pain that improves with PRN supportive care  -Palliative Care consulted, appreciate assistance  -PT/OT following  -PRN supportive care   -dose/medication adjustment as appropriate   -fall precautions  -monitor   -CM/SW following with plans for SNF and potential [facility] NH conversion if appropriate long term    Pyuria  UCx showing ESBL E. Coli  Patient is afebrile, asymptomatic  Will not treat at this time      Breast cancer  -chronic, s/p mastectomy with chronic chest wall pain  -continue home anastrozole    Atrial fibrillation  History of SVT  History of DVT of lower extremity  Long term current use of anticoagulants  -chronic, controlled mostly  -remains HDS (bouts of brief, self limiting AFib/SVT on tele (chronic in nature per Care Everywhere)  -continue home diltiazem, flecainide, and apixaban  -dose/medication adjustment as appropriate   -continue tele monitoring  -EKG PRN concerns   -Cardiology and PCP follow up advised at [place]; follows with Cardiology at [facility]    Type 2 diabetes mellitus, with long-term current use of insulin  -chronic, not well controlled at home  -HgA1C 8.1  -hold home glyburide  -LDSSI initiated  -dose/medication adjustment as appropriate   -monitor accuchecks AC/HS and PRN hypoglycemic protocol     Primary hypertension  -chronic, controlled  -continue home diltiazem, flecainide, furosemide, HCTZ, and lisinopril   -dose/medication adjustment as appropriate   -monitor     Elevated troponin  -placed in observation 2/11 due to exacerbations of her chronic chest, back, and knee pain brought on by emotional distress at not being well cared for at home  -unlikely ACS  -troponin trend 0.028 >> 0.046 >> 0.028  -EKG with NSR and TWA/inferolateral changes that are seen on prior studies per Care Everywhere  -D-dimer 1.42 with negative CTA  chest  -remains HDS (bouts of brief, self limiting AFib/SVT on tele (chronic in nature per Care Everywhere)  -continue home diltiazem, flecainide, furosemide, HCTZ, and lisinopril   -dose/medication adjustment as appropriate   -EKG PRN concerns  -monitor   -Cardiology and PCP follow up advised at [place]; follows with Cardiology at [facility]      VTE Risk Mitigation (From admission, onward)         Ordered     apixaban tablet 5 mg  2 times daily         02/12/23 1207     IP VTE HIGH RISK PATIENT  Once         02/12/23 0256     Place sequential compression device  Until discontinued         02/12/23 0256                Discharge Planning   [place]:      Code Status: Full Code   Is the patient medically ready for discharge?:     Reason for patient still in hospital (select all that apply): Patient trending condition  Discharge Plan A: Home with family, Home Health                  Franca Reyes PA-C  Department of Hospital Medicine   [facility] - Med Surg ([address])

## 2023-02-14 NOTE — PLAN OF CARE
VSS and afebrile, Oriented X4. Frequent weight shift assistance provided. Incontinence care performed PRN. Sacral wound care performed. External catheter in place. Plan of care reviewed with patient. Purposeful rounding done, call light at bed side, bed at lowest position, brakes on, non-skid socks on, will continue to monitor.      Problem: Adult Inpatient Plan of Care  Goal: Optimal Comfort and Wellbeing  Outcome: Ongoing, Progressing     Problem: Skin Injury Risk Increased  Goal: Skin Health and Integrity  Outcome: Ongoing, Progressing     Problem: Diabetes Comorbidity  Goal: Blood Glucose Level Within Targeted Range  Outcome: Ongoing, Progressing     Problem: Coping Ineffective  Goal: Effective Coping  Outcome: Ongoing, Progressing     Problem: Impaired Wound Healing  Goal: Optimal Wound Healing  Outcome: Ongoing, Progressing     Problem: Bariatric Environmental Safety  Goal: Safety Maintained with Care  Outcome: Ongoing, Progressing

## 2023-02-14 NOTE — PLAN OF CARE
NURSING HOME ORDERS    02/22/2023  [facility] LOCATION ([facility])  [facility] ([address])  [address]  [address]RD FLO[address]  [facility] [address]  Dept: [phone]  Loc: [phone]     Admit to Nursing Home:  detention    Diagnoses:  Active Hospital Problems    Diagnosis  POA    *Debility [R53.81]  Yes    Pyuria [R82.81]  Yes    Chronic chest wall pain [R07.89, G89.29]  Yes    Chronic pain of both shoulders [M25.511, G89.29, M25.512]  Yes    Decreased functional mobility and endurance [Z74.09]  Yes    History of supraventricular tachycardia [Z86.79]  Not Applicable    Elevated troponin [R77.8]  Yes    Primary hypertension [I10]  Yes    Type 2 diabetes mellitus, with long-term current use of insulin [E11.9, Z79.4]  Not Applicable    Atrial fibrillation [I48.91]  Yes    Breast cancer [C50.919]  Yes    Cancer related pain [G89.3]  Yes    Long term current use of anticoagulant [Z79.01]  Not Applicable    History of DVT of lower extremity [Z86.718]  Not Applicable    Bilateral primary osteoarthritis of knee [M17.0]  Yes      Resolved Hospital Problems   No resolved problems to display.       Patient is homebound due to:  Debility    Allergies:Review of patient's allergies indicates:  No Known Allergies    Vitals:  Routine    Diet: diabetic diet: 2000 calorie    Activities:   Activity as tolerated    Goals of Care Treatment Preferences:  Code Status: Full Code          What is most important right now is to focus on remaining as independent as possible, improvement in condition but with limits to invasive therapies.  Accordingly, we have decided that the best plan to meet the patient's goals includes continuing with treatment.      Labs:  per facility protocol    Nursing Precautions:  Aspiration , Fall, and Pressure ulcer prevention    Consults:   PT to evaluate and treat and OT to evaluate and treat    Miscellaneous Care: Routine Skin for Bedridden Patients:  Apply moisture barrier cream to  all                   Diabetes Care:  Fingerstick blood sugar AC and HS and Report CBG < 60 or > 350 to physician.      Medications: Discontinue all previous medication orders, if any. See new list below.     Medication List        CONTINUE taking these medications      anastrozole 1 mg Tab  Commonly known as: ARIMIDEX  Take 1 mg by mouth once daily.     cholecalciferol (vitamin D3) 1,250 mcg (50,000 unit) capsule  Take 50,000 Units by mouth every 30 days.     diphenhydrAMINE 25 mg capsule  Commonly known as: BENADRYL  Take 25 mg by mouth nightly as needed for Itching or Insomnia.     ELIQUIS 5 mg Tab  Generic drug: apixaban  Take 5 mg by mouth 2 (two) times daily.     EScitalopram oxalate 10 MG tablet  Commonly known as: LEXAPRO  Take 10 mg by mouth once daily.     flecainide 100 MG Tab  Commonly known as: TAMBOCOR  Take 100 mg by mouth every 12 (twelve) hours.     furosemide 20 MG tablet  Commonly known as: LASIX  Take 20 mg by mouth 2 (two) times daily.     glyBURIDE 5 MG tablet  Commonly known as: DIABETA  Take 5 mg by mouth every morning.     HYDROcodone-acetaminophen 5-325 mg per tablet  Commonly known as: NORCO  Take 1 tablet by mouth 2 (two) times daily as needed for Pain.     nystatin powder  Commonly known as: MYCOSTATIN  Apply 1 spray topically 2 (two) times a day.     pantoprazole 20 MG tablet  Commonly known as: PROTONIX  Take 20 mg by mouth daily as needed for Heartburn.     polyethylene glycol 17 gram/dose powder  Commonly known as: GLYCOLAX  Take 17 g by mouth daily as needed.     ramipriL 2.5 MG capsule  Commonly known as: ALTACE  Take 2.5 mg by mouth once daily.     senna-docusate 8.6-50 mg 8.6-50 mg per tablet  Commonly known as: PERICOLACE  Take 1 tablet by mouth once daily.     tiZANidine 2 MG tablet  Commonly known as: ZANAFLEX  Take 2 mg by mouth 2 (two) times daily as needed for Muscle spasms.     traMADoL 50 mg tablet  Commonly known as: ULTRAM  Take 50 mg by mouth every 6 (six) hours as  needed for Pain.     traZODone 50 MG tablet  Commonly known as: DESYREL  Take 50 mg by mouth every evening.            STOP taking these medications      diltiaZEM 120 MG Cp24  Commonly known as: CARDIZEM CD     hydroCHLOROthiazide 25 MG tablet  Commonly known as: HYDRODIURIL                Immunizations Administered as of 2/14/2023       No immunizations on file.            unknown    Some patients may experience side effects after vaccination.  These may include fever, headache, muscle or joint aches.  Most symptoms resolve with 24-48 hours and do not require urgent medical evaluation unless they persist for more than 72 hours or symptoms are concerning for an unrelated medical condition.          _________________________________  Franca Reyes PA-C  02/14/2023

## 2023-02-14 NOTE — PLAN OF CARE
Patient A&Ox4. Denies pain. Intermittent episodes of SVT throughout shift, MD made aware. EKG obtained. PRN Metroprolol added. 2L O2 in place. Repositioned as tolerated. Alternating pressures added to the mattress. Triad ointment administered BID. Voiding per purewick. Bmx1. Call light in reach. Safety measures in place.     Problem: Adult Inpatient Plan of Care  Goal: Plan of Care Review  Outcome: Ongoing, Progressing  Goal: Patient-Specific Goal (Individualized)  Outcome: Ongoing, Progressing  Goal: Absence of Hospital-Acquired Illness or Injury  Outcome: Ongoing, Progressing  Goal: Optimal Comfort and Wellbeing  Outcome: Ongoing, Progressing  Goal: Readiness for Transition of Care  Outcome: Ongoing, Progressing     Problem: Adjustment to Illness (Acute Coronary Syndrome)  Goal: Optimal Adaptation to Illness  Outcome: Ongoing, Progressing     Problem: Dysrhythmia (Acute Coronary Syndrome)  Goal: Normalized Cardiac Rhythm  Outcome: Ongoing, Progressing     Problem: Cardiac-Related Pain (Acute Coronary Syndrome)  Goal: Absence of Cardiac-Related Pain  Outcome: Ongoing, Progressing     Problem: Hemodynamic Instability (Acute Coronary Syndrome)  Goal: Effective Cardiac Pump Function  Outcome: Ongoing, Progressing     Problem: Tissue Perfusion (Acute Coronary Syndrome)  Goal: Adequate Tissue Perfusion  Outcome: Ongoing, Progressing     Problem: Arrhythmia/Dysrhythmia (Cardiac Catheterization)  Goal: Stable Heart Rate and Rhythm  Outcome: Ongoing, Progressing     Problem: Bleeding (Cardiac Catheterization)  Goal: Absence of Bleeding  Outcome: Ongoing, Progressing     Problem: Contrast-Induced Injury Risk (Cardiac Catheterization)  Goal: Absence of Contrast-Induced Injury  Outcome: Ongoing, Progressing     Problem: Embolism (Cardiac Catheterization)  Goal: Absence of Embolism Signs and Symptoms  Outcome: Ongoing, Progressing     Problem: Ongoing Anesthesia/Sedation Effects (Cardiac Catheterization)  Goal:  Anesthesia/Sedation Recovery  Outcome: Ongoing, Progressing     Problem: Pain (Cardiac Catheterization)  Goal: Acceptable Pain Control  Outcome: Ongoing, Progressing     Problem: Vascular Access Protection (Cardiac Catheterization)  Goal: Absence of Vascular Access Complication  Outcome: Ongoing, Progressing     Problem: Skin Injury Risk Increased  Goal: Skin Health and Integrity  Outcome: Ongoing, Progressing     Problem: Diabetes Comorbidity  Goal: Blood Glucose Level Within Targeted Range  Outcome: Ongoing, Progressing     Problem: Coping Ineffective  Goal: Effective Coping  Outcome: Ongoing, Progressing     Problem: Impaired Wound Healing  Goal: Optimal Wound Healing  Outcome: Ongoing, Progressing     Problem: Bariatric Environmental Safety  Goal: Safety Maintained with Care  Outcome: Ongoing, Progressing

## 2023-02-14 NOTE — PT/OT/SLP PROGRESS
"Physical Therapy Treatment    Patient Name:  Dania Her   MRN:  [id number]    Recommendations:     Discharge Recommendations: nursing facility, skilled (with transition to nursing home)  Discharge Equipment Recommendations: hospital bed  Barriers to discharge: Inaccessible home, Decreased caregiver support, and current functional status    Assessment:     Dania Hre is a 82 y.o. female admitted with a medical diagnosis of Debility.  She presents with the following impairments/functional limitations: impaired endurance, weakness, impaired self care skills, impaired functional mobility, gait instability, impaired balance, decreased lower extremity function, pain, impaired cardiopulmonary response to activity .    Patient tolerated session well today.   Patient sat EOB for 12 minutes while eating breakfast with SBA. R sided trunk lean noted when pt became fatigued  Maximal assist of two therapists to transfer to/from supine/sitting EOB.   Patient continues to exhibit self limiting behaviors and requires maximal encouragement for participation.     Rehab Prognosis: Fair; patient would benefit from acute skilled PT services to address these deficits and reach maximum level of function.    Recent Surgery: * No surgery found *      Plan:     During this hospitalization, patient to be seen 3 x/week to address the identified rehab impairments via gait training, therapeutic activities, therapeutic exercises and progress toward the following goals:    Plan of Care Expires:  03/15/23    Subjective     Chief Complaint: "I am just so stiff"   Patient/Family Comments/goals: Patient agrees to participate in PT treatment.   Pain/Comfort:  Pain Rating 1:  (patient reports bilateral leg stiffness but does not provide rating)  Pain Addressed 1: Reposition, Distraction, Cessation of Activity      Objective:     Communicated with nursing prior to session.  Patient found supine with peripheral IV, telemetry, bed alarm, oxygen upon " PT entry to room.     General Precautions: Standard, fall  Orthopedic Precautions: N/A  Braces: N/A  Respiratory Status: Nasal cannula, flow 3 L/min     Functional Mobility:  Bed Mobility:     Rolling Left:  total assistance and of two persons  Rolling Right: total assistance and of two persons  Scooting: total assistance and of two persons  Supine to Sit: maximal assistance and of two persons  Sit to Supine: maximal assistance and of two persons      AM-PAC 6 CLICK MOBILITY  Turning over in bed (including adjusting bedclothes, sheets and blankets)?: 2  Sitting down on and standing up from a chair with arms (e.g., wheelchair, bedside commode, etc.): 1  Moving from lying on back to sitting on the side of the bed?: 2  Moving to and from a bed to a chair (including a wheelchair)?: 1  Need to walk in hospital room?: 1  Climbing 3-5 steps with a railing?: 1  Basic Mobility Total Score: 8       Treatment & Education:    Upright sitting tolerance:   - patient sat upright at EOB for 12 minutes while eating breakfast with SBA.   - patient required assistance with reaching across table to  cup/put cup down   - R sided trunk lean became more prominent as patient began to fatigue while sitting up     Patient left supine with all lines intact, call button in reach, and nursing notified..    GOALS:   Multidisciplinary Problems       Physical Therapy Goals          Problem: Physical Therapy    Goal Priority Disciplines Outcome Goal Variances Interventions   Physical Therapy Goal     PT, PT/OT Ongoing, Progressing     Description: Goals to be met by: 3/15/2023    Patient will increase functional independence with mobility by performin. Transfer supine <> sitting EOB with minimal assist.   2. Be independent with supine home exercise program to maintain current mobility                           Time Tracking:     PT Received On: 23  PT Start Time: 831     PT Stop Time: 854  PT Total Time (min): 23 min      Billable Minutes: Therapeutic Activity 23    Treatment Type: Treatment  PT/PTA: PT     PTA Visit Number: 0     02/14/2023

## 2023-02-14 NOTE — SUBJECTIVE & OBJECTIVE
Interval History: Seen at bedside, resting. In good spirits, eager to get to SNF. Trial voltaren gel for her knees. Her UCX shows ESBL E coli however she denies urinary symptoms, will not treat    Review of Systems   Constitutional:  Positive for activity change and fatigue.   Respiratory:  Negative for cough and shortness of breath.    Cardiovascular:  Negative for chest pain.   Gastrointestinal:  Positive for constipation (chronic). Negative for abdominal pain, nausea and vomiting.   Genitourinary:  Negative for difficulty urinating and dysuria.   Musculoskeletal:  Positive for arthralgias and gait problem.   Neurological:  Positive for weakness. Negative for dizziness and headaches.   Objective:     Vital Signs (Most Recent):  Temp: 98.1 °F (36.7 °C) (02/14/23 0729)  Pulse: 80 (02/14/23 0959)  Resp: 18 (02/14/23 1047)  BP: (!) 120/53 (02/14/23 0729)  SpO2: 100 % (02/14/23 0729)   Vital Signs (24h Range):  Temp:  [97.5 °F (36.4 °C)-99.2 °F (37.3 °C)] 98.1 °F (36.7 °C)  Pulse:  [] 80  Resp:  [15-18] 18  SpO2:  [96 %-100 %] 100 %  BP: (103-154)/(53-86) 120/53     Weight: 129.3 kg (285 lb 0.9 oz)  Body mass index is 47.44 kg/m².    Intake/Output Summary (Last 24 hours) at 2/14/2023 1112  Last data filed at 2/14/2023 0610  Gross per 24 hour   Intake 240 ml   Output 1600 ml   Net -1360 ml      Physical Exam  Vitals and nursing note reviewed.   Constitutional:       General: She is not in acute distress.     Appearance: Normal appearance. She is not ill-appearing.   Cardiovascular:      Rate and Rhythm: Normal rate and regular rhythm.   Pulmonary:      Effort: Pulmonary effort is normal. No respiratory distress.   Abdominal:      General: Abdomen is flat. There is no distension.      Palpations: Abdomen is soft.   Musculoskeletal:      Cervical back: Neck supple.      Right lower leg: No edema.      Left lower leg: No edema.   Skin:     General: Skin is warm and dry.   Neurological:      General: No focal deficit  present.      Mental Status: She is alert.   Psychiatric:         Mood and Affect: Mood normal.         Behavior: Behavior normal.       Significant Labs: All pertinent labs within the past 24 hours have been reviewed.    Significant Imaging: I have reviewed all pertinent imaging results/findings within the past 24 hours.

## 2023-02-14 NOTE — PLAN OF CARE
Problem: Occupational Therapy  Goal: Occupational Therapy Goal  Description: Goals to be met by: 2/26/2023     Patient will increase functional independence with ADLs by performing:    Grooming while EOB with Supervision and Set Up Assistance.  Sitting at edge of bed x15 minutes with Supervision.  Rolling to Bilateral with Moderate Assistance.   Supine to sit with Maximum Assistance x1.    Outcome: Ongoing, Progressing     Pt tolerated OT session well this date. Performed EOB ADLs for approx 12 minutes. Goals updated. Pt motivated to progress.    DC: SNF with transition to NH  DME: TBD

## 2023-02-14 NOTE — PLAN OF CARE
JOSE J spoke with admissions at [facility], who stated they are considering the patient at this time. Rebeca is to call JOSE J back with a decision tomorrow.

## 2023-02-14 NOTE — CONSULTS
Cardiology Consult  2/14/2023  4:33 PM    Attending Cardiologist: Cisco Rivera M.D.  Primary Care Provider: Primary Doctor No  Chief Complaint/Reason For Consultation:  irregular heart rate      Problem list  Patient Active Problem List   Diagnosis    Elevated troponin    Primary hypertension    Type 2 diabetes mellitus, with long-term current use of insulin    Atrial fibrillation    Debility    Bilateral primary osteoarthritis of knee    Cancer related pain    History of DVT of lower extremity    Long term current use of anticoagulant    Breast cancer    Chronic chest wall pain    Chronic pain of both shoulders    Decreased functional mobility and endurance    History of supraventricular tachycardia    Pyuria         HPI:  Dania Her is a 82 y.o.year-old female with PMH of atrial fib/flutter (followed by EP Dr. Block at [facility]), HTN, DM, PHTN who presented on 2/12 with CP (from chart reviewed).  She does not know why she came to hospital.  She is in process of being transferred to SNF.  Cardiology consult called for arrhythmias on telemetry (@13:12 4 second pause.  @14:00 AF. Now sinus angeline with HR 50).  She reports occasional dizziness.  She does not ambulate.  She denies syncope.    Medications  Current Facility-Administered Medications   Medication Dose Route Frequency Provider Last Rate Last Admin    acetaminophen tablet 650 mg  650 mg Oral Q6H PRN Kassie Navarro NP        anastrozole tablet 1 mg  1 mg Oral Daily Tala Arroyo PA-C   1 mg at 02/14/23 0913    apixaban tablet 5 mg  5 mg Oral BID Tala Arroyo PA-C   5 mg at 02/14/23 0913    atorvastatin tablet 80 mg  80 mg Oral Daily Francisco Bliss NP   80 mg at 02/14/23 0913    bisacodyL suppository 10 mg  10 mg Rectal Daily PRN Kassie Navarro NP        dextrose 10% bolus 125 mL 125 mL  12.5 g Intravenous PRN Francisco Bliss NP        dextrose 10% bolus 250 mL 250 mL  25 g Intravenous PRN Francisco Bliss NP         diclofenac sodium 1 % gel 4 g  4 g Topical (Top) Daily Franca Reyes PA-C   4 g at 02/14/23 1231    EScitalopram oxalate tablet 10 mg  10 mg Oral Daily Francisco Bliss NP   10 mg at 02/14/23 0914    flecainide tablet 100 mg  100 mg Oral Q12H Tala Arroyo PA-C   100 mg at 02/14/23 0913    furosemide tablet 20 mg  20 mg Oral BID Francisco Bliss NP   20 mg at 02/14/23 0913    glucagon (human recombinant) injection 1 mg  1 mg Intramuscular PRN Francisco Bliss NP        glucose chewable tablet 16 g  16 g Oral PRN Francisco Bliss NP        glucose chewable tablet 24 g  24 g Oral PRN Francisco Bliss NP        HYDROmorphone injection 1 mg  1 mg Intravenous Q6H PRN Kassie Navarro NP        insulin aspart U-100 pen 0-5 Units  0-5 Units Subcutaneous QID (AC + HS) PRN Francisco Bliss NP   1 Units at 02/13/23 2050    lisinopriL tablet 2.5 mg  2.5 mg Oral Daily Tala Arroyo PA-C   2.5 mg at 02/14/23 0913    metoprolol injection 5 mg  5 mg Intravenous Q5 Min PRN Kassie Navarro NP   5 mg at 02/13/23 1602    miconazole NITRATE 2 % top powder   Topical (Top) BID Tala Arroyo PA-C   Given at 02/14/23 0914    oxyCODONE-acetaminophen 7.5-325 mg per tablet 1 tablet  1 tablet Oral Q4H PRN Kassie Navarro NP   1 tablet at 02/14/23 1047    pantoprazole EC tablet 40 mg  40 mg Oral Daily PRN Tala Arroyo PA-C   40 mg at 02/14/23 1047    polyethylene glycol packet 17 g  17 g Oral Daily Kassie Navarro NP   17 g at 02/14/23 0913    senna-docusate 8.6-50 mg per tablet 1 tablet  1 tablet Oral BID Kassie Navarro NP   1 tablet at 02/14/23 0913    sodium chloride 0.9% flush 10 mL  10 mL Intravenous PRN Miki Horton MD        tiZANidine tablet 2 mg  2 mg Oral BID PRN Tala Arroyo PA-C        traZODone tablet 50 mg  50 mg Oral QHS Tala Arroyo PA-C   50 mg at 02/13/23 2047      Prior to Admission medications    Medication Sig Start Date End Date  Taking? Authorizing Provider   anastrozole (ARIMIDEX) 1 mg Tab Take 1 mg by mouth once daily. 1/26/23  Yes Historical Provider   cholecalciferol, vitamin D3, 1,250 mcg (50,000 unit) capsule Take 50,000 Units by mouth every 30 days. 2/7/23  Yes Historical Provider   diltiaZEM (CARDIZEM CD) 120 MG Cp24 Take 120 mg by mouth once daily. 11/12/22  Yes Historical Provider   diphenhydrAMINE (BENADRYL) 25 mg capsule Take 25 mg by mouth nightly as needed for Itching or Insomnia.   Yes Historical Provider   ELIQUIS 5 mg Tab Take 5 mg by mouth 2 (two) times daily. 1/13/23  Yes Historical Provider   escitalopram oxalate (LEXAPRO) 10 MG tablet Take 10 mg by mouth once daily.   Yes Historical Provider   flecainide (TAMBOCOR) 100 MG Tab Take 100 mg by mouth every 12 (twelve) hours. 1/13/23  Yes Historical Provider   furosemide (LASIX) 20 MG tablet Take 20 mg by mouth 2 (two) times daily.   Yes Historical Provider   glyBURIDE (DIABETA) 5 MG tablet Take 5 mg by mouth every morning. 1/12/23  Yes Historical Provider   HYDROcodone-acetaminophen (NORCO) 5-325 mg per tablet Take 1 tablet by mouth 2 (two) times daily as needed for Pain. 1/20/23  Yes Historical Provider   nystatin (MYCOSTATIN) powder Apply 1 spray topically 2 (two) times a day. 2/9/23  Yes Historical Provider   pantoprazole (PROTONIX) 20 MG tablet Take 20 mg by mouth daily as needed for Heartburn. 12/5/22  Yes Historical Provider   polyethylene glycol (GLYCOLAX) 17 gram/dose powder Take 17 g by mouth daily as needed.   Yes Historical Provider   ramipriL (ALTACE) 2.5 MG capsule Take 2.5 mg by mouth once daily. 1/13/23  Yes Historical Provider   senna-docusate 8.6-50 mg (PERICOLACE) 8.6-50 mg per tablet Take 1 tablet by mouth once daily.   Yes Historical Provider   tiZANidine (ZANAFLEX) 2 MG tablet Take 2 mg by mouth 2 (two) times daily as needed for Muscle spasms. 12/5/22  Yes Historical Provider   tramadol (ULTRAM) 50 mg tablet Take 50 mg by mouth every 6 (six) hours as  needed for Pain.   Yes Historical Provider   traZODone (DESYREL) 50 MG tablet Take 50 mg by mouth every evening. 22  Yes Historical Provider   hydrochlorothiazide (HYDRODIURIL) 25 MG tablet Take 25 mg by mouth once daily.  23 Yes Historical Provider         History  Past Medical History:   Diagnosis Date    Arthritis     Diabetes mellitus     Hypertension      Past Surgical History:   Procedure Laterality Date     SECTION      OOPHORECTOMY       Social History     Socioeconomic History    Marital status:    Tobacco Use    Smoking status: Never         Allergies  Review of patient's allergies indicates:  No Known Allergies      Review of Systems   Review of Systems   Unable to perform ROS: Dementia       Physical Exam  Wt Readings from Last 1 Encounters:   23 129.3 kg (285 lb 0.9 oz)     BP Readings from Last 3 Encounters:   23 102/62   16 (!) 167/84   11/09/15 (!) 161/74     Pulse Readings from Last 1 Encounters:   23 (!) 54     Body mass index is 47.44 kg/m².    Physical Exam  Constitutional:       General: She is not in acute distress.     Appearance: She is obese.   Cardiovascular:      Rate and Rhythm: Regular rhythm. Bradycardia present.      Heart sounds: S1 normal and S2 normal.   Pulmonary:      Breath sounds: Normal breath sounds and air entry.   Musculoskeletal:      Right lower leg: Edema present.      Left lower leg: Edema present.   Neurological:      Mental Status: She is alert.         Laboratory:  Trended Lab Data:  Recent Labs   Lab 23  0359   WBC 5.84 5.93   HGB 13.0 12.2   HCT 40.1 38.0   * 119*       Recent Labs   Lab 23  0412 23  1224 23  0359    137 137   K 3.9 4.1 3.9   CL 96 95 95   CO2 36* 34* 35*   BUN 13 14 12   * 271* 178*   CALCIUM 11.0* 10.8* 10.8*   MG 2.1  --  1.8   PHOS  --  3.2  --        Recent Labs   Lab 23  1224   PROT 8.7*  --    ALBUMIN 3.1* 3.0*    BILITOT 0.5  --    AST 27  --    ALT 16  --    ALKPHOS 89  --        Recent Labs   Lab 02/12/23 0327   INR 1.0       Cardiac:   Recent Labs   Lab 02/12/23  0327 02/12/23  0916 02/13/23  1224   TROPONINI 0.046* 0.029* 0.024       FLP:   Lab Results   Component Value Date    CHOL 150 02/12/2023    HDL 41 02/12/2023    LDLCALC 94.2 02/12/2023    TRIG 74 02/12/2023    CHOLHDL 27.3 02/12/2023     DM:   Lab Results   Component Value Date    HGBA1C 8.1 (H) 02/12/2023    HGBA1C 7.8 (H) 11/07/2021    LDLCALC 94.2 02/12/2023    CREATININE 0.7 02/14/2023     Thyroid: No results found for: TSH, FREET4, S3ZZZNV, H8SLYQJ, THYROIDAB  Anemia: No results found for: IRON, TIBC, FERRITIN, GAIFETOX52, FOLATE  Urinalysis:   Lab Results   Component Value Date    LABURIN ESCHERICHIA COLI ESBL  >100,000 cfu/ml   (A) 02/12/2023    COLORU Yellow 02/12/2023    SPECGRAV 1.015 02/12/2023    NITRITE Positive (A) 02/12/2023    KETONESU Negative 02/12/2023    UROBILINOGEN Negative 02/12/2023     @    Other Results:  EKG (my interpretation):    TELEMETRY:  sinus angeline HR 50    Echo: No results found for this or any previous visit.    Radiology:  X-Ray Chest 1 View    Result Date: 2/11/2023  EXAMINATION: XR CHEST 1 VIEW CLINICAL HISTORY: Chest pain, unspecified TECHNIQUE: Single frontal view of the chest was performed. COMPARISON: None FINDINGS: Cardiomediastinal silhouette is enlarged.  Lungs are symmetrically expanded.  There is mild increased interstitial attenuation.  No evidence of focal consolidative process, pneumothorax, or significant pleural effusion.  No acute osseous abnormality identified.     Enlarged cardiomediastinal silhouette with mild increased interstitial attenuation which could reflect chronic change versus mild pulmonary interstitial edema.  No prior studies are available for comparison. Electronically signed by: Alejandro Calixto MD Date:    02/11/2023 Time:    22:44    CTA Chest Non-Coronary (PE Studies)    Result Date:  2/12/2023  1. No evidence of PE. 2. Cardiomegaly with dilated pulmonary trunk which can be seen with pulmonary hypertension. 3. Bilateral renal hypodensities, most likely cysts.  These measure slightly higher than simple fluid density which may be due to artifact.  Future nonemergent renal ultrasound follow-up could be obtained to ensure cystic nature of these lesions. Electronically signed by: Alejandro Calixto MD Date:    02/12/2023 Time:    00:31        Current Medications:     Infusions:       Scheduled:   anastrozole  1 mg Oral Daily    apixaban  5 mg Oral BID    atorvastatin  80 mg Oral Daily    diclofenac sodium  4 g Topical (Top) Daily    EScitalopram oxalate  10 mg Oral Daily    flecainide  100 mg Oral Q12H    furosemide  20 mg Oral BID    lisinopriL  2.5 mg Oral Daily    miconazole NITRATE 2 %   Topical (Top) BID    polyethylene glycol  17 g Oral Daily    senna-docusate 8.6-50 mg  1 tablet Oral BID    traZODone  50 mg Oral QHS        PRN:  acetaminophen, bisacodyL, dextrose 10%, dextrose 10%, glucagon (human recombinant), glucose, glucose, HYDROmorphone, insulin aspart U-100, metoprolol, oxyCODONE-acetaminophen, pantoprazole, sodium chloride 0.9%, tiZANidine      Assessment and Plan:  Tachy-angeline syndrome.  Multiple sinus pauses at least 4 seconds, PAF and now in sinus bradycardia with HR 50  -will stop diltiazem  -continue flecainide for now and monitor on telemetry.  -will likely need pacemaker and will discuss timing.    Thank you for allowing me to participate in the care of Dania ERVIN Her.      Cisco Rivera MD, F.A.C.C, F.S.C.A.I.    Disclaimer: This document was created using voice recognition software (M*Modal Fluency Direct). Although it may be edited, this document may contain errors related to incorrect recognition of the spoken word. Please call the physician if clarification is needed.

## 2023-02-15 ENCOUNTER — PATIENT OUTREACH (OUTPATIENT)
Dept: ADMINISTRATIVE | Facility: OTHER | Age: 83
End: 2023-02-15
Payer: MEDICARE

## 2023-02-15 PROBLEM — I49.5 TACHY-BRADY SYNDROME: Status: ACTIVE | Noted: 2023-02-15

## 2023-02-15 LAB
ANION GAP SERPL CALC-SCNC: 9 MMOL/L (ref 8–16)
BUN SERPL-MCNC: 15 MG/DL (ref 8–23)
CALCIUM SERPL-MCNC: 10.9 MG/DL (ref 8.7–10.5)
CHLORIDE SERPL-SCNC: 94 MMOL/L (ref 95–110)
CO2 SERPL-SCNC: 32 MMOL/L (ref 23–29)
CREAT SERPL-MCNC: 0.8 MG/DL (ref 0.5–1.4)
EST. GFR  (NO RACE VARIABLE): >60 ML/MIN/1.73 M^2
GLUCOSE SERPL-MCNC: 218 MG/DL (ref 70–110)
POCT GLUCOSE: 179 MG/DL (ref 70–110)
POCT GLUCOSE: 183 MG/DL (ref 70–110)
POCT GLUCOSE: 204 MG/DL (ref 70–110)
POCT GLUCOSE: 212 MG/DL (ref 70–110)
POTASSIUM SERPL-SCNC: 4 MMOL/L (ref 3.5–5.1)
SODIUM SERPL-SCNC: 135 MMOL/L (ref 136–145)

## 2023-02-15 PROCEDURE — 25000003 PHARM REV CODE 250: Performed by: NURSE PRACTITIONER

## 2023-02-15 PROCEDURE — 99233 SBSQ HOSP IP/OBS HIGH 50: CPT | Mod: ,,, | Performed by: PHYSICIAN ASSISTANT

## 2023-02-15 PROCEDURE — 36415 COLL VENOUS BLD VENIPUNCTURE: CPT | Performed by: NURSE PRACTITIONER

## 2023-02-15 PROCEDURE — 21400001 HC TELEMETRY ROOM

## 2023-02-15 PROCEDURE — 99232 PR SUBSEQUENT HOSPITAL CARE,LEVL II: ICD-10-PCS | Mod: ,,, | Performed by: INTERNAL MEDICINE

## 2023-02-15 PROCEDURE — 99232 SBSQ HOSP IP/OBS MODERATE 35: CPT | Mod: ,,, | Performed by: INTERNAL MEDICINE

## 2023-02-15 PROCEDURE — 25000003 PHARM REV CODE 250: Performed by: PHYSICIAN ASSISTANT

## 2023-02-15 PROCEDURE — 99233 PR SUBSEQUENT HOSPITAL CARE,LEVL III: ICD-10-PCS | Mod: ,,, | Performed by: PHYSICIAN ASSISTANT

## 2023-02-15 PROCEDURE — 80048 BASIC METABOLIC PNL TOTAL CA: CPT | Performed by: NURSE PRACTITIONER

## 2023-02-15 RX ADMIN — APIXABAN 5 MG: 2.5 TABLET, FILM COATED ORAL at 10:02

## 2023-02-15 RX ADMIN — SENNOSIDES AND DOCUSATE SODIUM 1 TABLET: 50; 8.6 TABLET ORAL at 10:02

## 2023-02-15 RX ADMIN — TRAZODONE HYDROCHLORIDE 50 MG: 50 TABLET ORAL at 08:02

## 2023-02-15 RX ADMIN — ESCITALOPRAM OXALATE 10 MG: 10 TABLET ORAL at 10:02

## 2023-02-15 RX ADMIN — ANASTROZOLE 1 MG: 1 TABLET, COATED ORAL at 10:02

## 2023-02-15 RX ADMIN — FUROSEMIDE 20 MG: 20 TABLET ORAL at 10:02

## 2023-02-15 RX ADMIN — FUROSEMIDE 20 MG: 20 TABLET ORAL at 08:02

## 2023-02-15 RX ADMIN — FLECAINIDE ACETATE 100 MG: 50 TABLET ORAL at 11:02

## 2023-02-15 RX ADMIN — APIXABAN 5 MG: 2.5 TABLET, FILM COATED ORAL at 08:02

## 2023-02-15 RX ADMIN — SENNOSIDES AND DOCUSATE SODIUM 1 TABLET: 50; 8.6 TABLET ORAL at 08:02

## 2023-02-15 RX ADMIN — MICONAZOLE NITRATE: 20 POWDER TOPICAL at 08:02

## 2023-02-15 RX ADMIN — MICONAZOLE NITRATE: 20 POWDER TOPICAL at 11:02

## 2023-02-15 RX ADMIN — INSULIN ASPART 2 UNITS: 100 INJECTION, SOLUTION INTRAVENOUS; SUBCUTANEOUS at 11:02

## 2023-02-15 RX ADMIN — ATORVASTATIN CALCIUM 80 MG: 20 TABLET, FILM COATED ORAL at 10:02

## 2023-02-15 RX ADMIN — OXYCODONE AND ACETAMINOPHEN 1 TABLET: 7.5; 325 TABLET ORAL at 08:02

## 2023-02-15 RX ADMIN — POLYETHYLENE GLYCOL 3350 17 G: 17 POWDER, FOR SOLUTION ORAL at 10:02

## 2023-02-15 RX ADMIN — FLECAINIDE ACETATE 100 MG: 50 TABLET ORAL at 08:02

## 2023-02-15 RX ADMIN — DICLOFENAC 4 G: 10 GEL TOPICAL at 11:02

## 2023-02-15 RX ADMIN — OXYCODONE AND ACETAMINOPHEN 1 TABLET: 7.5; 325 TABLET ORAL at 05:02

## 2023-02-15 NOTE — ASSESSMENT & PLAN NOTE
Decreased functional mobility and endurance  Chronic chest wall pain  Cancer related pain  Bilateral primary osteoarthritis of knee  Chronic pain of both shoulders  -placed in observation 2/11 due to exacerbations of her chronic chest, back, and knee pain brought on by emotional distress at not being well cared for at home  -reports ongoing chronic pain that improves with PRN supportive care  -Palliative Care consulted, appreciate assistance  -PT/OT following  -PRN supportive care   -dose/medication adjustment as appropriate   -fall precautions  -monitor   -CM/SW following with plans for SNF and potential assisted NH conversion if appropriate long term

## 2023-02-15 NOTE — PROGRESS NOTES
IP Liaison - Initial Visit Note    Patient: Dania Her  MRN:  [id number]  Date of Service:  2/15/2023  Completed by:  PARKER Castorena    Reason for Visit   Patient presents with    IP Liaison Initial Visit       RSW met with patient at bedside in order to complete SDOH questionnaire and liaison assessment.  Pt has identified no barriers to care.    The following were addressed during this visit:  - Review SDOH Questions   - Complete patient assessment   - Complete initial visit with patient        Patient Summary     IP Liaison Patient Assessment    General  Level of Caregiver support: Member independent and does not need caregiver assistance  Have you had to make a decision between paying for any of the following in the last 2 months?: None  Transportation means: Family  Employment status: Retired and not working  Current symptoms: Sleep disturbances  Assessments  Was the PHQ Depression Screening completed this visit?: Yes  Was the BENNETT-7 Screening completed this visit?: No       PARKER Castorena

## 2023-02-15 NOTE — PROGRESS NOTES
"[facility] - [address] Medicine  Progress Note    Patient Name: Dania Her  MRN: [id number]  Patient Class: IP- Inpatient   Admission Date: 2/11/2023  Length of Stay: 0 days  Attending Physician: Enoch Sim MD  Primary Care Provider: Primary Doctor No        Subjective:     Principal Problem:Debility        HPI:  Per admitting provider:  "The patient is a 83 yo female with a past medical history of atrial fibrillation, HTN, IDDM, and pulmonary hypertension who presents for evaluation of increasing frequency of her standing intermittent chest pain over the past few to many days. She states the chest pain sometimes occurs with exertion and other times has no inciting factors. She endorses associated dyspnea with these episodes and reports worsening leg swelling. She also notes productive cough with clear mucus "when I have hot soup or pepper." She states she has had episodes of diaphoresis "and I get hot hot hot." She attributes this to her breast cancer related medication, denies any acute change, or relation to her episodes of chest pain.  Denies recent fever, chills, pain elsewhere, and other somatic complaints.  The patient has a mildly elevated troponin to .028, d-dimer 1.42.  She will be admitted for further management of her elevated troponin and Cardiology consult."    History clarified with the patient, she reports she is been slowly becoming more debilitated and for the last 2 months she is been unable to walk.  Therefore, she turned the living room into her bedroom as she is unable to go up the stairs.  She lives at home with her 83-year-old  who also has multiple medical issues.  He is unable to provide proper care for her and her children her only able to stop by every now and then.  She has a sitter who comes only 3 days per week.  The rest of the time, she has no one to change her or turn her.  She said it has become increasingly clear to her that she may need to " move into a nursing facility.  The reason she came to the hospital yesterday because her daughter decided to have a birthday party in the living room which is her bedroom.  She was upset about all the people invading her personal space and because she got upset all of her chronic aches and pains were exacerbated.  The chest pain she reported is the same pain she has had on and off since her mastectomy, she also reported back pain and knee pain.  She denied any associated nausea, jaw pain, arm pain, diaphoresis, or shortness of breath.      Overview/Hospital Course:  Ms. Her was placed in observation 2/11 due to exacerbations of her chronic chest, back, and knee pain brought on by emotional distress at not being well cared for at home. Troponin trend 0.028 >> 0.046 >> 0.028; EKG with NSR and TWA/inferolateral changes that are seen on prior studies per Care Everywhere. D-dimer 1.42 with negative CTA chest. Intermittently with nonsustained SVT and also pauses, caridology consulted: diltiazem discontinued, discussed with patient's cardiologist outpatient, PPM placement to be considered in the future.Palliative Care consulted, appreciate assistance. PT/OT following.     Disposition plans: CM/SW following with plans for SNF and potential half-way NH conversion if appropriate long term.  Cardiology and PCP follow up advised at [place]; follows with Cardiology at [facility].       Interval History: Daughter at bedside this AM, Mrs. Her reports improvement in her knee pain with the voltaren gel. Appreciate cardiology- yesterday patient had pauses on tele, diltiazem discontinued and Cardiology spoke with patient's outpt cardiologist. PPM to be considered outpatient  Awaiting placmenet    Review of Systems   Constitutional:  Positive for activity change and fatigue.   Cardiovascular:  Negative for chest pain.   Gastrointestinal:  Positive for constipation (chronic). Negative for abdominal pain and nausea.   Genitourinary:   Negative for difficulty urinating and dysuria.   Musculoskeletal:  Positive for arthralgias and gait problem.   Neurological:  Positive for weakness. Negative for dizziness and headaches.   Objective:     Vital Signs (Most Recent):  Temp: 97.1 °F (36.2 °C) (02/15/23 1242)  Pulse: (!) 54 (02/15/23 1242)  Resp: 18 (02/15/23 1242)  BP: (!) 102/57 (02/15/23 1242)  SpO2: 100 % (02/15/23 1242)   Vital Signs (24h Range):  Temp:  [97.1 °F (36.2 °C)-99.3 °F (37.4 °C)] 97.1 °F (36.2 °C)  Pulse:  [] 54  Resp:  [12-19] 18  SpO2:  [97 %-100 %] 100 %  BP: (101-115)/(57-69) 102/57     Weight: 129.1 kg (284 lb 9.8 oz)  Body mass index is 47.36 kg/m².    Intake/Output Summary (Last 24 hours) at 2/15/2023 1343  Last data filed at 2/15/2023 0557  Gross per 24 hour   Intake 240 ml   Output 600 ml   Net -360 ml      Physical Exam  Vitals and nursing note reviewed.   Constitutional:       General: She is not in acute distress.     Appearance: Normal appearance. She is not ill-appearing.   Cardiovascular:      Rate and Rhythm: Normal rate and regular rhythm.   Pulmonary:      Effort: Pulmonary effort is normal. No respiratory distress.   Abdominal:      General: Abdomen is flat.      Palpations: Abdomen is soft.   Musculoskeletal:      Right lower leg: No edema.      Left lower leg: No edema.   Skin:     General: Skin is warm and dry.   Neurological:      General: No focal deficit present.      Mental Status: She is alert.   Psychiatric:         Mood and Affect: Mood normal.       Significant Labs: All pertinent labs within the past 24 hours have been reviewed.    Significant Imaging: I have reviewed all pertinent imaging results/findings within the past 24 hours.      Assessment/Plan:      * Debility  Decreased functional mobility and endurance  Chronic chest wall pain  Cancer related pain  Bilateral primary osteoarthritis of knee  Chronic pain of both shoulders  -placed in observation 2/11 due to exacerbations of her chronic  chest, back, and knee pain brought on by emotional distress at not being well cared for at home  -reports ongoing chronic pain that improves with PRN supportive care  -Palliative Care consulted, appreciate assistance  -PT/OT following  -PRN supportive care   -dose/medication adjustment as appropriate   -fall precautions  -monitor   -CM/SW following with plans for SNF and potential alf NH conversion if appropriate long term    Tachy-angeline syndrome   Multiple sinus pauses at least 4 seconds, as well as nonsustained SVT  -diltiazem stopped  -will likely need pacemaker.  Discussed with Dr. Block (EP at [facility])     Pyuria  UCx showing ESBL E. Coli  Patient is afebrile, asymptomatic  Will not treat at this time      Breast cancer  -chronic, s/p mastectomy with chronic chest wall pain  -continue home anastrozole    Atrial fibrillation  History of SVT  History of DVT of lower extremity  Long term current use of anticoagulants  -chronic, controlled mostly  -remains HDS (bouts of brief, self limiting AFib/SVT on tele (chronic in nature per Care Everywhere)  -continue home diltiazem, flecainide, and apixaban  -dose/medication adjustment as appropriate   -continue tele monitoring  -EKG PRN concerns   -Cardiology and PCP follow up advised at NH; follows with Cardiology at [facility]    Type 2 diabetes mellitus, with long-term current use of insulin  -chronic, not well controlled at home  -HgA1C 8.1  -hold home glyburide  -LDSSI initiated  -dose/medication adjustment as appropriate   -monitor accuchecks AC/HS and PRN hypoglycemic protocol     Primary hypertension  -chronic, controlled  -continue home diltiazem, flecainide, furosemide, HCTZ, and lisinopril   -dose/medication adjustment as appropriate   -monitor     Elevated troponin  -placed in observation 2/11 due to exacerbations of her chronic chest, back, and knee pain brought on by emotional distress at not being well cared for at home  -unlikely ACS  -troponin trend 0.028  >> 0.046 >> 0.028  -EKG with NSR and TWA/inferolateral changes that are seen on prior studies per Care Everywhere  -D-dimer 1.42 with negative CTA chest  -remains HDS (bouts of brief, self limiting AFib/SVT on tele (chronic in nature per Care Everywhere)  -continue home diltiazem, flecainide, furosemide, HCTZ, and lisinopril   -dose/medication adjustment as appropriate   -EKG PRN concerns  -monitor   -Cardiology and PCP follow up advised at [place]; follows with Cardiology at [facility]      VTE Risk Mitigation (From admission, onward)         Ordered     apixaban tablet 5 mg  2 times daily         02/12/23 1207     IP VTE HIGH RISK PATIENT  Once         02/12/23 0256     Place sequential compression device  Until discontinued         02/12/23 0256                Discharge Planning   [place]:      Code Status: Full Code   Is the patient medically ready for discharge?:     Reason for patient still in hospital (select all that apply): Patient trending condition  Discharge Plan A: Home with family, Home Health                  Franca Reyes PA-C  Department of Hospital Medicine   [facility] - Med Surg ([address])

## 2023-02-15 NOTE — SUBJECTIVE & OBJECTIVE
Interval History: Daughter at bedside this AM, Mrs. Her reports improvement in her knee pain with the voltaren gel. Appreciate cardiology- yesterday patient had pauses on tele, diltiazem discontinued and Cardiology spoke with patient's outpt cardiologist. PPM to be considered outpatient  Awaiting placmenet    Review of Systems   Constitutional:  Positive for activity change and fatigue.   Cardiovascular:  Negative for chest pain.   Gastrointestinal:  Positive for constipation (chronic). Negative for abdominal pain and nausea.   Genitourinary:  Negative for difficulty urinating and dysuria.   Musculoskeletal:  Positive for arthralgias and gait problem.   Neurological:  Positive for weakness. Negative for dizziness and headaches.   Objective:     Vital Signs (Most Recent):  Temp: 97.1 °F (36.2 °C) (02/15/23 1242)  Pulse: (!) 54 (02/15/23 1242)  Resp: 18 (02/15/23 1242)  BP: (!) 102/57 (02/15/23 1242)  SpO2: 100 % (02/15/23 1242)   Vital Signs (24h Range):  Temp:  [97.1 °F (36.2 °C)-99.3 °F (37.4 °C)] 97.1 °F (36.2 °C)  Pulse:  [] 54  Resp:  [12-19] 18  SpO2:  [97 %-100 %] 100 %  BP: (101-115)/(57-69) 102/57     Weight: 129.1 kg (284 lb 9.8 oz)  Body mass index is 47.36 kg/m².    Intake/Output Summary (Last 24 hours) at 2/15/2023 1343  Last data filed at 2/15/2023 0557  Gross per 24 hour   Intake 240 ml   Output 600 ml   Net -360 ml      Physical Exam  Vitals and nursing note reviewed.   Constitutional:       General: She is not in acute distress.     Appearance: Normal appearance. She is not ill-appearing.   Cardiovascular:      Rate and Rhythm: Normal rate and regular rhythm.   Pulmonary:      Effort: Pulmonary effort is normal. No respiratory distress.   Abdominal:      General: Abdomen is flat.      Palpations: Abdomen is soft.   Musculoskeletal:      Right lower leg: No edema.      Left lower leg: No edema.   Skin:     General: Skin is warm and dry.   Neurological:      General: No focal deficit present.       Mental Status: She is alert.   Psychiatric:         Mood and Affect: Mood normal.       Significant Labs: All pertinent labs within the past 24 hours have been reviewed.    Significant Imaging: I have reviewed all pertinent imaging results/findings within the past 24 hours.

## 2023-02-15 NOTE — PROGRESS NOTES
"    Cardiology Progress Note    2/15/2023  9:40 AM    Subjective/Interim:      No complaints.  No issues overnight.  Telemetry showed sinus rhythm, no further pauses or tachy events.  Diltiazem stopped.     Objective:   24-hour Vitals:  Temp:  [98 °F (36.7 °C)-99.3 °F (37.4 °C)] 98.9 °F (37.2 °C)  Pulse:  [] 63  Resp:  [12-19] 19  SpO2:  [97 %-100 %] 100 %  BP: (101-115)/(57-69) 101/57    Physical Examination:  Vitals: BP (!) 101/57 (Patient Position: Lying)   Pulse 63   Temp 98.9 °F (37.2 °C) (Oral)   Resp 19   Ht 5' 5" (1.651 m)   Wt 129.1 kg (284 lb 9.8 oz)   SpO2 100%   BMI 47.36 kg/m²     Physical Exam  Constitutional:       General: She is not in acute distress.     Appearance: She is obese.   Cardiovascular:      Rate and Rhythm: Regular rhythm. Bradycardia present.      Heart sounds: S1 normal and S2 normal.   Pulmonary:      Breath sounds: Normal breath sounds and air entry.   Musculoskeletal:      Right lower leg: Edema present.      Left lower leg: Edema present.   Neurological:      Mental Status: She is alert.         Intake/Output Summary (Last 24 hours) at 2/15/2023 0940  Last data filed at 2/15/2023 0557  Gross per 24 hour   Intake 240 ml   Output 1050 ml   Net -810 ml       Laboratory:  Trended Lab Data:  Recent Labs   Lab 02/11/23  2210 02/14/23  0359   WBC 5.84 5.93   HGB 13.0 12.2   HCT 40.1 38.0   * 119*       Recent Labs   Lab 02/13/23  0412 02/13/23  1224 02/14/23  0359 02/15/23  0409    137 137 135*   K 3.9 4.1 3.9 4.0   CL 96 95 95 94*   CO2 36* 34* 35* 32*   BUN 13 14 12 15   * 271* 178* 218*   CALCIUM 11.0* 10.8* 10.8* 10.9*   MG 2.1  --  1.8  --    PHOS  --  3.2  --   --        Recent Labs   Lab 02/11/23  2210 02/13/23  1224   PROT 8.7*  --    ALBUMIN 3.1* 3.0*   BILITOT 0.5  --    AST 27  --    ALT 16  --    ALKPHOS 89  --        Recent Labs   Lab 02/12/23  0327   INR 1.0       Cardiac:   Recent Labs   Lab 02/12/23  0327 02/12/23  0916 02/13/23  1224 "   TROPONINI 0.046* 0.029* 0.024       FLP:   Lab Results   Component Value Date    CHOL 150 02/12/2023    HDL 41 02/12/2023    LDLCALC 94.2 02/12/2023    TRIG 74 02/12/2023    CHOLHDL 27.3 02/12/2023     DM:   Lab Results   Component Value Date    HGBA1C 8.1 (H) 02/12/2023    HGBA1C 7.8 (H) 11/07/2021    LDLCALC 94.2 02/12/2023    CREATININE 0.8 02/15/2023     Thyroid: No results found for: TSH, FREET4, S8CQQSE, Z0VSSPI, THYROIDAB  Anemia: No results found for: IRON, TIBC, FERRITIN, BAOZPNVU64, FOLATE  Urinalysis:   Lab Results   Component Value Date    LABURIN ESCHERICHIA COLI ESBL  >100,000 cfu/ml   (A) 02/12/2023    COLORU Yellow 02/12/2023    SPECGRAV 1.015 02/12/2023    NITRITE Positive (A) 02/12/2023    KETONESU Negative 02/12/2023    UROBILINOGEN Negative 02/12/2023     @      Other Results:  EKG (my interpretation):    TELEMETRY:  sinus    Echo: No results found for this or any previous visit.    Radiology:  X-Ray Chest 1 View    Result Date: 2/11/2023  EXAMINATION: XR CHEST 1 VIEW CLINICAL HISTORY: Chest pain, unspecified TECHNIQUE: Single frontal view of the chest was performed. COMPARISON: None FINDINGS: Cardiomediastinal silhouette is enlarged.  Lungs are symmetrically expanded.  There is mild increased interstitial attenuation.  No evidence of focal consolidative process, pneumothorax, or significant pleural effusion.  No acute osseous abnormality identified.     Enlarged cardiomediastinal silhouette with mild increased interstitial attenuation which could reflect chronic change versus mild pulmonary interstitial edema.  No prior studies are available for comparison. Electronically signed by: Alejandro Calixto MD Date:    02/11/2023 Time:    22:44            Current Medications:     Infusions:       Scheduled:   anastrozole  1 mg Oral Daily    apixaban  5 mg Oral BID    atorvastatin  80 mg Oral Daily    diclofenac sodium  4 g Topical (Top) Daily    EScitalopram oxalate  10 mg Oral Daily    flecainide  100  mg Oral Q12H    furosemide  20 mg Oral BID    lisinopriL  2.5 mg Oral Daily    miconazole NITRATE 2 %   Topical (Top) BID    polyethylene glycol  17 g Oral Daily    senna-docusate 8.6-50 mg  1 tablet Oral BID    traZODone  50 mg Oral QHS        PRN:  acetaminophen, bisacodyL, dextrose 10%, dextrose 10%, glucagon (human recombinant), glucose, glucose, HYDROmorphone, insulin aspart U-100, metoprolol, oxyCODONE-acetaminophen, pantoprazole, sodium chloride 0.9%, tiZANidine     Assessment and Plan:     Dania Her is a 82 y.o.female with  Tachy-angeline syndrome.  Multiple sinus pauses at least 4 seconds, PAF and now in sinus bradycardia with HR 50  -diltiazem stopped 2/14 and stable on telemetry overnight with no further pauses or tachy events.  -continue flecainide for now  -will likely need pacemaker.  Discussed with Dr. Block (EP at [facility]) who will f/u and arrange pacemaker as outpatient.  Ok to discharge.    Discussed with daughter at bedside.         Cisco Rivera MD        Disclaimer: This document was created using voice recognition software (M*Modal Fluency Direct). Although it may be edited, this document may contain errors related to incorrect recognition of the spoken word. Please call the physician if clarification is needed.

## 2023-02-15 NOTE — ASSESSMENT & PLAN NOTE
Multiple sinus pauses at least 4 seconds, as well as nonsustained SVT  -diltiazem stopped  -will likely need pacemaker.  Discussed with Dr. Block (EP at [facility])

## 2023-02-16 ENCOUNTER — PATIENT OUTREACH (OUTPATIENT)
Dept: ADMINISTRATIVE | Facility: OTHER | Age: 83
End: 2023-02-16
Payer: MEDICARE

## 2023-02-16 LAB
ANION GAP SERPL CALC-SCNC: 7 MMOL/L (ref 8–16)
BUN SERPL-MCNC: 11 MG/DL (ref 8–23)
CALCIUM SERPL-MCNC: 10.8 MG/DL (ref 8.7–10.5)
CHLORIDE SERPL-SCNC: 93 MMOL/L (ref 95–110)
CO2 SERPL-SCNC: 35 MMOL/L (ref 23–29)
CREAT SERPL-MCNC: 0.7 MG/DL (ref 0.5–1.4)
EST. GFR  (NO RACE VARIABLE): >60 ML/MIN/1.73 M^2
GLUCOSE SERPL-MCNC: 194 MG/DL (ref 70–110)
POCT GLUCOSE: 168 MG/DL (ref 70–110)
POCT GLUCOSE: 169 MG/DL (ref 70–110)
POCT GLUCOSE: 224 MG/DL (ref 70–110)
POTASSIUM SERPL-SCNC: 4.1 MMOL/L (ref 3.5–5.1)
SODIUM SERPL-SCNC: 135 MMOL/L (ref 136–145)

## 2023-02-16 PROCEDURE — 99233 SBSQ HOSP IP/OBS HIGH 50: CPT | Mod: ,,, | Performed by: PHYSICIAN ASSISTANT

## 2023-02-16 PROCEDURE — 25000003 PHARM REV CODE 250: Performed by: NURSE PRACTITIONER

## 2023-02-16 PROCEDURE — 97530 THERAPEUTIC ACTIVITIES: CPT | Mod: CQ

## 2023-02-16 PROCEDURE — 21400001 HC TELEMETRY ROOM

## 2023-02-16 PROCEDURE — 99232 PR SUBSEQUENT HOSPITAL CARE,LEVL II: ICD-10-PCS | Mod: ,,, | Performed by: INTERNAL MEDICINE

## 2023-02-16 PROCEDURE — 80048 BASIC METABOLIC PNL TOTAL CA: CPT | Performed by: NURSE PRACTITIONER

## 2023-02-16 PROCEDURE — 99233 PR SUBSEQUENT HOSPITAL CARE,LEVL III: ICD-10-PCS | Mod: ,,, | Performed by: PHYSICIAN ASSISTANT

## 2023-02-16 PROCEDURE — 36415 COLL VENOUS BLD VENIPUNCTURE: CPT | Performed by: NURSE PRACTITIONER

## 2023-02-16 PROCEDURE — 25000003 PHARM REV CODE 250: Performed by: PHYSICIAN ASSISTANT

## 2023-02-16 PROCEDURE — 99232 SBSQ HOSP IP/OBS MODERATE 35: CPT | Mod: ,,, | Performed by: INTERNAL MEDICINE

## 2023-02-16 PROCEDURE — 97535 SELF CARE MNGMENT TRAINING: CPT | Mod: CO

## 2023-02-16 PROCEDURE — 94761 N-INVAS EAR/PLS OXIMETRY MLT: CPT

## 2023-02-16 PROCEDURE — 97110 THERAPEUTIC EXERCISES: CPT | Mod: CQ

## 2023-02-16 RX ADMIN — FLECAINIDE ACETATE 100 MG: 50 TABLET ORAL at 10:02

## 2023-02-16 RX ADMIN — SENNOSIDES AND DOCUSATE SODIUM 1 TABLET: 50; 8.6 TABLET ORAL at 10:02

## 2023-02-16 RX ADMIN — TRAZODONE HYDROCHLORIDE 50 MG: 50 TABLET ORAL at 10:02

## 2023-02-16 RX ADMIN — OXYCODONE AND ACETAMINOPHEN 1 TABLET: 7.5; 325 TABLET ORAL at 03:02

## 2023-02-16 RX ADMIN — APIXABAN 5 MG: 2.5 TABLET, FILM COATED ORAL at 10:02

## 2023-02-16 RX ADMIN — ATORVASTATIN CALCIUM 80 MG: 20 TABLET, FILM COATED ORAL at 10:02

## 2023-02-16 RX ADMIN — ESCITALOPRAM OXALATE 10 MG: 10 TABLET ORAL at 10:02

## 2023-02-16 RX ADMIN — OXYCODONE AND ACETAMINOPHEN 1 TABLET: 7.5; 325 TABLET ORAL at 10:02

## 2023-02-16 RX ADMIN — LISINOPRIL 2.5 MG: 2.5 TABLET ORAL at 10:02

## 2023-02-16 RX ADMIN — OXYCODONE AND ACETAMINOPHEN 1 TABLET: 7.5; 325 TABLET ORAL at 11:02

## 2023-02-16 RX ADMIN — FUROSEMIDE 20 MG: 20 TABLET ORAL at 10:02

## 2023-02-16 RX ADMIN — MICONAZOLE NITRATE: 20 POWDER TOPICAL at 10:02

## 2023-02-16 RX ADMIN — DICLOFENAC 4 G: 10 GEL TOPICAL at 10:02

## 2023-02-16 RX ADMIN — POLYETHYLENE GLYCOL 3350 17 G: 17 POWDER, FOR SOLUTION ORAL at 10:02

## 2023-02-16 RX ADMIN — ANASTROZOLE 1 MG: 1 TABLET, COATED ORAL at 10:02

## 2023-02-16 RX ADMIN — INSULIN ASPART 2 UNITS: 100 INJECTION, SOLUTION INTRAVENOUS; SUBCUTANEOUS at 11:02

## 2023-02-16 NOTE — PROGRESS NOTES
"[facility], [place], operated by [facility] - [address] Medicine  Progress Note    Patient Name: Dania Her  MRN: [id number]  Patient Class: IP- Inpatient   Admission Date: 2/11/2023  Length of Stay: 1 days  Attending Physician: Enoch Sim MD  Primary Care Provider: Primary Doctor No        Subjective:     Principal Problem:Debility        HPI:  Per admitting provider:  "The patient is a 83 yo female with a past medical history of atrial fibrillation, HTN, IDDM, and pulmonary hypertension who presents for evaluation of increasing frequency of her standing intermittent chest pain over the past few to many days. She states the chest pain sometimes occurs with exertion and other times has no inciting factors. She endorses associated dyspnea with these episodes and reports worsening leg swelling. She also notes productive cough with clear mucus "when I have hot soup or pepper." She states she has had episodes of diaphoresis "and I get hot hot hot." She attributes this to her breast cancer related medication, denies any acute change, or relation to her episodes of chest pain.  Denies recent fever, chills, pain elsewhere, and other somatic complaints.  The patient has a mildly elevated troponin to .028, d-dimer 1.42.  She will be admitted for further management of her elevated troponin and Cardiology consult."    History clarified with the patient, she reports she is been slowly becoming more debilitated and for the last 2 months she is been unable to walk.  Therefore, she turned the living room into her bedroom as she is unable to go up the stairs.  She lives at home with her 83-year-old  who also has multiple medical issues.  He is unable to provide proper care for her and her children her only able to stop by every now and then.  She has a sitter who comes only 3 days per week.  The rest of the time, she has no one to change her or turn her.  She said it has become increasingly clear to her that she may need to " move into a nursing facility.  The reason she came to the hospital yesterday because her daughter decided to have a birthday party in the living room which is her bedroom.  She was upset about all the people invading her personal space and because she got upset all of her chronic aches and pains were exacerbated.  The chest pain she reported is the same pain she has had on and off since her mastectomy, she also reported back pain and knee pain.  She denied any associated nausea, jaw pain, arm pain, diaphoresis, or shortness of breath.      Overview/Hospital Course:  Ms. Her was placed in observation 2/11 due to exacerbations of her chronic chest, back, and knee pain brought on by emotional distress at not being well cared for at home. Troponin trend 0.028 >> 0.046 >> 0.028; EKG with NSR and TWA/inferolateral changes that are seen on prior studies per Care Everywhere. D-dimer 1.42 with negative CTA chest. Intermittently with nonsustained SVT and also pauses, caridology consulted: diltiazem discontinued, discussed with patient's cardiologist outpatient, PPM placement to be considered in the future.Palliative Care consulted, appreciate assistance. PT/OT following and recommending SNF.     Disposition plans: CM/SW following with plans for SNF and potential [facility] conversion if appropriate long term.  Cardiology and PCP follow up advised at [place]; follows with Cardiology at [facility].       Interval History: Seen at bedside, son on phone. Discussed PTOT recs for snf and considerations to [facility]. Patient acknowledges she has needed increasingly more help at home. Her arthritic pain has improved with voltaren gel    Review of Systems   Constitutional:  Positive for activity change and fatigue.   Cardiovascular:  Negative for chest pain.   Gastrointestinal:  Positive for constipation (chronic). Negative for abdominal pain and nausea.   Genitourinary:  Negative for difficulty urinating and dysuria.   Musculoskeletal:   Positive for arthralgias and gait problem.   Neurological:  Positive for weakness. Negative for dizziness and headaches.   Objective:     Vital Signs (Most Recent):  Temp: 97.9 °F (36.6 °C) (02/16/23 1035)  Pulse: 69 (02/16/23 1200)  Resp: 18 (02/16/23 1138)  BP: 125/62 (02/16/23 1035)  SpO2: 96 % (02/16/23 1035) Vital Signs (24h Range):  Temp:  [97.9 °F (36.6 °C)-99.1 °F (37.3 °C)] 97.9 °F (36.6 °C)  Pulse:  [50-69] 69  Resp:  [15-18] 18  SpO2:  [95 %-100 %] 96 %  BP: (114-127)/(59-72) 125/62     Weight: 129.1 kg (284 lb 9.8 oz)  Body mass index is 47.36 kg/m².    Intake/Output Summary (Last 24 hours) at 2/16/2023 1327  Last data filed at 2/16/2023 0822  Gross per 24 hour   Intake --   Output 2300 ml   Net -2300 ml      Physical Exam  Vitals and nursing note reviewed.   Constitutional:       General: She is not in acute distress.     Appearance: Normal appearance. She is not ill-appearing.   Cardiovascular:      Rate and Rhythm: Normal rate and regular rhythm.   Pulmonary:      Effort: Pulmonary effort is normal. No respiratory distress.   Abdominal:      General: Abdomen is flat.      Palpations: Abdomen is soft.   Musculoskeletal:      Right lower leg: No edema.      Left lower leg: No edema.   Skin:     General: Skin is warm and dry.   Neurological:      General: No focal deficit present.      Mental Status: She is alert.   Psychiatric:         Mood and Affect: Mood normal.       Significant Labs: All pertinent labs within the past 24 hours have been reviewed.    Significant Imaging: I have reviewed all pertinent imaging results/findings within the past 24 hours.      Assessment/Plan:      * Debility  Decreased functional mobility and endurance  Chronic chest wall pain  Cancer related pain  Bilateral primary osteoarthritis of knee  Chronic pain of both shoulders  -placed in observation 2/11 due to exacerbations of her chronic chest, back, and knee pain brought on by emotional distress at not being well cared  for at home  -reports ongoing chronic pain that improves with PRN supportive care  -Palliative Care consulted, appreciate assistance  -PT/OT following  -PRN supportive care   -dose/medication adjustment as appropriate   -fall precautions  -monitor   -CM/SW following with plans for SNF and potential snf NH conversion if appropriate long term    Tachy-angeline syndrome   Multiple sinus pauses at least 4 seconds, as well as nonsustained SVT  -diltiazem stopped  -will likely need pacemaker.  Discussed with Dr. Block (EP at [facility])     Pyuria  UCx showing ESBL E. Coli  Patient is afebrile, asymptomatic  Will not treat at this time      Breast cancer  -chronic, s/p mastectomy with chronic chest wall pain  -continue home anastrozole    Atrial fibrillation  History of SVT  History of DVT of lower extremity  Long term current use of anticoagulants  -chronic, controlled mostly  -remains HDS (bouts of brief, self limiting AFib/SVT on tele (chronic in nature per Care Everywhere)  -continue home diltiazem, flecainide, and apixaban  -dose/medication adjustment as appropriate   -continue tele monitoring  -EKG PRN concerns   -Cardiology and PCP follow up advised at [place]; follows with Cardiology at [facility]    Type 2 diabetes mellitus, with long-term current use of insulin  -chronic, not well controlled at home  -HgA1C 8.1  -hold home glyburide  -LDSSI initiated  -dose/medication adjustment as appropriate   -monitor accuchecks AC/HS and PRN hypoglycemic protocol     Primary hypertension  -chronic, controlled  -continue home diltiazem, flecainide, furosemide, HCTZ, and lisinopril   -dose/medication adjustment as appropriate   -monitor     Elevated troponin  -placed in observation 2/11 due to exacerbations of her chronic chest, back, and knee pain brought on by emotional distress at not being well cared for at home  -unlikely ACS  -troponin trend 0.028 >> 0.046 >> 0.028  -EKG with NSR and TWA/inferolateral changes that are seen on prior  studies per Care Everywhere  -D-dimer 1.42 with negative CTA chest  -remains HDS (bouts of brief, self limiting AFib/SVT on tele (chronic in nature per Care Everywhere)  -continue home diltiazem, flecainide, furosemide, HCTZ, and lisinopril   -dose/medication adjustment as appropriate   -EKG PRN concerns  -monitor   -Cardiology and PCP follow up advised at [place]; follows with Cardiology at [facility]      VTE Risk Mitigation (From admission, onward)         Ordered     apixaban tablet 5 mg  2 times daily         02/12/23 1207     IP VTE HIGH RISK PATIENT  Once         02/12/23 0256     Place sequential compression device  Until discontinued         02/12/23 0256                Discharge Planning   [place]:      Code Status: Full Code   Is the patient medically ready for discharge?:     Reason for patient still in hospital (select all that apply): Patient trending condition  Discharge Plan A: Home with family, Home Health                  Franca Reyes PA-C  Department of Hospital Medicine   [facility] - Med Surg ([address])

## 2023-02-16 NOTE — PLAN OF CARE
Problem: Adult Inpatient Plan of Care  Goal: Plan of Care Review  Outcome: Ongoing, Progressing  Goal: Optimal Comfort and Wellbeing  Outcome: Ongoing, Progressing     Problem: Skin Injury Risk Increased  Goal: Skin Health and Integrity  Outcome: Ongoing, Progressing     Problem: Diabetes Comorbidity  Goal: Blood Glucose Level Within Targeted Range  Outcome: Ongoing, Progressing     Problem: Bariatric Environmental Safety  Goal: Safety Maintained with Care  Outcome: Ongoing, Progressing     Problem: Infection  Goal: Absence of Infection Signs and Symptoms  Outcome: Ongoing, Progressing     Problem: Fall Injury Risk  Goal: Absence of Fall and Fall-Related Injury  Outcome: Ongoing, Progressing

## 2023-02-16 NOTE — SUBJECTIVE & OBJECTIVE
Interval History: Seen at bedside, son on phone. Discussed PTOT recs for snf and considerations to intermediate NH. Patient acknowledges she has needed increasingly more help at home. Her arthritic pain has improved with voltaren gel    Review of Systems   Constitutional:  Positive for activity change and fatigue.   Cardiovascular:  Negative for chest pain.   Gastrointestinal:  Positive for constipation (chronic). Negative for abdominal pain and nausea.   Genitourinary:  Negative for difficulty urinating and dysuria.   Musculoskeletal:  Positive for arthralgias and gait problem.   Neurological:  Positive for weakness. Negative for dizziness and headaches.   Objective:     Vital Signs (Most Recent):  Temp: 97.9 °F (36.6 °C) (02/16/23 1035)  Pulse: 69 (02/16/23 1200)  Resp: 18 (02/16/23 1138)  BP: 125/62 (02/16/23 1035)  SpO2: 96 % (02/16/23 1035) Vital Signs (24h Range):  Temp:  [97.9 °F (36.6 °C)-99.1 °F (37.3 °C)] 97.9 °F (36.6 °C)  Pulse:  [50-69] 69  Resp:  [15-18] 18  SpO2:  [95 %-100 %] 96 %  BP: (114-127)/(59-72) 125/62     Weight: 129.1 kg (284 lb 9.8 oz)  Body mass index is 47.36 kg/m².    Intake/Output Summary (Last 24 hours) at 2/16/2023 1327  Last data filed at 2/16/2023 0822  Gross per 24 hour   Intake --   Output 2300 ml   Net -2300 ml      Physical Exam  Vitals and nursing note reviewed.   Constitutional:       General: She is not in acute distress.     Appearance: Normal appearance. She is not ill-appearing.   Cardiovascular:      Rate and Rhythm: Normal rate and regular rhythm.   Pulmonary:      Effort: Pulmonary effort is normal. No respiratory distress.   Abdominal:      General: Abdomen is flat.      Palpations: Abdomen is soft.   Musculoskeletal:      Right lower leg: No edema.      Left lower leg: No edema.   Skin:     General: Skin is warm and dry.   Neurological:      General: No focal deficit present.      Mental Status: She is alert.   Psychiatric:         Mood and Affect: Mood normal.        Significant Labs: All pertinent labs within the past 24 hours have been reviewed.    Significant Imaging: I have reviewed all pertinent imaging results/findings within the past 24 hours.

## 2023-02-16 NOTE — PROGRESS NOTES
"    Cardiology Progress Note    2/16/2023  9:30 AM    Subjective/Interim:      No complaints.  No issues overnight.  Telemetry showed sinus rhythm, no further pauses or tachy events.       Objective:   24-hour Vitals:  Temp:  [97.1 °F (36.2 °C)-99.1 °F (37.3 °C)] 97.9 °F (36.6 °C)  Pulse:  [50-66] 57  Resp:  [15-18] 16  SpO2:  [95 %-100 %] 96 %  BP: (102-127)/(57-72) 125/62    Physical Examination:  Vitals: /62 (BP Location: Right arm, Patient Position: Lying)   Pulse (!) 57   Temp 97.9 °F (36.6 °C) (Oral)   Resp 16   Ht 5' 5" (1.651 m)   Wt 129.1 kg (284 lb 9.8 oz)   SpO2 96%   BMI 47.36 kg/m²     Physical Exam  Constitutional:       General: She is not in acute distress.     Appearance: She is obese.   Cardiovascular:      Rate and Rhythm: Regular rhythm. Bradycardia present.      Heart sounds: S1 normal and S2 normal.   Pulmonary:      Breath sounds: Normal breath sounds and air entry.   Musculoskeletal:      Right lower leg: Edema present.      Left lower leg: Edema present.   Neurological:      Mental Status: She is alert.         Intake/Output Summary (Last 24 hours) at 2/16/2023 1058  Last data filed at 2/16/2023 0822  Gross per 24 hour   Intake --   Output 2000 ml   Net -2000 ml         Laboratory:  Trended Lab Data:  Recent Labs   Lab 02/11/23  2210 02/14/23  0359   WBC 5.84 5.93   HGB 13.0 12.2   HCT 40.1 38.0   * 119*         Recent Labs   Lab 02/13/23  0412 02/13/23  1224 02/14/23  0359 02/15/23  0409 02/16/23  0448    137 137 135* 135*   K 3.9 4.1 3.9 4.0 4.1   CL 96 95 95 94* 93*   CO2 36* 34* 35* 32* 35*   BUN 13 14 12 15 11   * 271* 178* 218* 194*   CALCIUM 11.0* 10.8* 10.8* 10.9* 10.8*   MG 2.1  --  1.8  --   --    PHOS  --  3.2  --   --   --          Recent Labs   Lab 02/11/23  2210 02/13/23  1224   PROT 8.7*  --    ALBUMIN 3.1* 3.0*   BILITOT 0.5  --    AST 27  --    ALT 16  --    ALKPHOS 89  --          Recent Labs   Lab 02/12/23  0327   INR 1.0         Cardiac: "   Recent Labs   Lab 02/12/23  0327 02/12/23  0916 02/13/23  1224   TROPONINI 0.046* 0.029* 0.024         FLP:   Lab Results   Component Value Date    CHOL 150 02/12/2023    HDL 41 02/12/2023    LDLCALC 94.2 02/12/2023    TRIG 74 02/12/2023    CHOLHDL 27.3 02/12/2023     DM:   Lab Results   Component Value Date    HGBA1C 8.1 (H) 02/12/2023    HGBA1C 7.8 (H) 11/07/2021    LDLCALC 94.2 02/12/2023    CREATININE 0.7 02/16/2023     Thyroid: No results found for: TSH, FREET4, R4ASYZC, L9IDMBH, THYROIDAB  Anemia: No results found for: IRON, TIBC, FERRITIN, UCQVJACB43, FOLATE  Urinalysis:   Lab Results   Component Value Date    LABURIN ESCHERICHIA COLI ESBL  >100,000 cfu/ml   (A) 02/12/2023    COLORU Yellow 02/12/2023    SPECGRAV 1.015 02/12/2023    NITRITE Positive (A) 02/12/2023    KETONESU Negative 02/12/2023    UROBILINOGEN Negative 02/12/2023     @      Other Results:  EKG (my interpretation):    TELEMETRY:  sinus    Echo: No results found for this or any previous visit.    Radiology:  X-Ray Chest 1 View    Result Date: 2/11/2023  EXAMINATION: XR CHEST 1 VIEW CLINICAL HISTORY: Chest pain, unspecified TECHNIQUE: Single frontal view of the chest was performed. COMPARISON: None FINDINGS: Cardiomediastinal silhouette is enlarged.  Lungs are symmetrically expanded.  There is mild increased interstitial attenuation.  No evidence of focal consolidative process, pneumothorax, or significant pleural effusion.  No acute osseous abnormality identified.     Enlarged cardiomediastinal silhouette with mild increased interstitial attenuation which could reflect chronic change versus mild pulmonary interstitial edema.  No prior studies are available for comparison. Electronically signed by: Alejandro Calixto MD Date:    02/11/2023 Time:    22:44            Current Medications:     Infusions:       Scheduled:   anastrozole  1 mg Oral Daily    apixaban  5 mg Oral BID    atorvastatin  80 mg Oral Daily    diclofenac sodium  4 g Topical (Top)  Daily    EScitalopram oxalate  10 mg Oral Daily    flecainide  100 mg Oral Q12H    furosemide  20 mg Oral BID    lisinopriL  2.5 mg Oral Daily    miconazole NITRATE 2 %   Topical (Top) BID    polyethylene glycol  17 g Oral Daily    senna-docusate 8.6-50 mg  1 tablet Oral BID    traZODone  50 mg Oral QHS        PRN:  acetaminophen, bisacodyL, dextrose 10%, dextrose 10%, glucagon (human recombinant), glucose, glucose, HYDROmorphone, insulin aspart U-100, metoprolol, oxyCODONE-acetaminophen, pantoprazole, sodium chloride 0.9%, tiZANidine     Assessment and Plan:     Dania Her is a 82 y.o.female with  Tachy-angeline syndrome.  Multiple sinus pauses at least 4 seconds, PAF and now in sinus bradycardia with HR 50  -diltiazem stopped 2/14 and stable on telemetry overnight with no further pauses or tachy events.  -continue flecainide for now  -will likely need pacemaker.  Discussed with Dr. Block (her EP doctor at [facility]) who will f/u and arrange pacemaker as outpatient.  Ok to discharge.             Cisco Rivera MD        Disclaimer: This document was created using voice recognition software (M*Modal Fluency Direct). Although it may be edited, this document may contain errors related to incorrect recognition of the spoken word. Please call the physician if clarification is needed.

## 2023-02-16 NOTE — PLAN OF CARE
SW received the notification that [facility] has denied SNF for this patient. [facility] offered a P2P or to have the patient go jail.

## 2023-02-16 NOTE — PROGRESS NOTES
RSW met with patient and patient has no needs and declines resources. Patient states she may dennise into a nursing home, patient is unsure. RSW to follow up.     Maricarmen Barber, PARKER

## 2023-02-16 NOTE — PLAN OF CARE
Problem: Occupational Therapy  Goal: Occupational Therapy Goal  Description: Goals to be met by: 2/26/2023     Patient will increase functional independence with ADLs by performing:    Grooming while EOB with Supervision and Set Up Assistance.  Sitting at edge of bed x15 minutes with Supervision.  Rolling to Bilateral with Moderate Assistance.   Supine to sit with Maximum Assistance x1.    2/16/2023 1235 by BRYANT Cornell  Outcome: Ongoing, Progressing

## 2023-02-16 NOTE — PLAN OF CARE
Resting comfortably in bed at this time.  VSS on O2 and afebrile this shift.  Good appetite and good UOP this shift, dependent to external catheter.  Repositioned frequently in bed wedge in use.    Can perform some basic hygeine and fees self, Refuses   Free from injury or skin breakdown; Fall precautions maintained and call light in reach.  POC updated questions answered and comments acknowledged.  Purposeful hourly rounding completed this shift.

## 2023-02-16 NOTE — PT/OT/SLP PROGRESS
Occupational Therapy   Treatment    Name: Dania Her  MRN: [id number]  Admitting Diagnosis:  Debility       Recommendations:     Discharge Recommendations: nursing facility, skilled (with transition to NH)  Discharge Equipment Recommendations:  hospital bed  Barriers to discharge:  Decreased caregiver support    Assessment:     Dania Her is a 82 y.o. female with a medical diagnosis of Debility.  She was alert, orient, and pleasant. Performance deficits affecting function are weakness, impaired endurance, impaired self care skills, impaired functional mobility, gait instability, impaired balance, decreased coordination, decreased upper extremity function, decreased lower extremity function, pain, impaired cardiopulmonary response to activity.     Rehab Prognosis:  Good; patient would benefit from acute skilled OT services to address these deficits and reach maximum level of function.       Pt is progressing toward goals. Pt require maximum encouragement to participate in tx session and increase of time to complete tasks due to chronic pain. Pt is able to tolerate sitting EOB approx 25 min with SBA to Min A - need assistance for correct posturing for  to R leaning due to fatigue.      Plan:     Patient to be seen 4 x/week to address the above listed problems via self-care/home management  Plan of Care Expires: 02/26/23  Plan of Care Reviewed with: patient    Subjective     Pain/Comfort:  Pain Rating 1: 7/10  Location - Side 1:  (general)  Location - Orientation 1: posterior  Location 1: back  Pain Addressed 1: Distraction, Cessation of Activity    Objective:     Communicated with: RN prior to session.  Patient found supine with peripheral IV, telemetry, PureWick, oxygen, bed alarm upon OT entry to room.    General Precautions: Standard, fall    Orthopedic Precautions:N/A  Braces: N/A  Respiratory Status: Nasal cannula, flow 2 L/min     Occupational Performance:     Bed Mobility:    Rolling R/L: Max 2 - need  assistance with initial cueing to start rolling  Supine <> Sit: Mod A and increase of time - require tactile cue to initiate LE to EOB. With increase of time and Mod Assistance, Pt was able to push up from side lying to sitting.  Scooting: Max A x 1   Pt educated on breathing technique for pain management    Functional Mobility/Transfers:  EOB to progress, pt tolerated approx 25 min of EOB activity with SBA for sitting balance, fatiguing to Min A for upright sitting to R leaning   Pt is able to correct upright posture with tactile cues to initiate postural correction.  Functional EOB activity performed to improve activity tolerance for increased endurance and independence with ADLs as well as functional balance retraining for fall prevention.     Activities of Daily Living:  Grooming: Set Up with SBA.       [facility] 6 Click ADL: 14    Treatment & Education:  OT role, plan of care, progression of goals, importance of continued OOB activity, ADL/functional transfer and mobility retraining, discharge recommendation, call don't fall, safety precautions, fall prevention, and breathing strategies for pain management.     Patient left supine with all lines intact and call button in reach    GOALS:   Multidisciplinary Problems       Occupational Therapy Goals          Problem: Occupational Therapy    Goal Priority Disciplines Outcome Interventions   Occupational Therapy Goal     OT, PT/OT Ongoing, Progressing    Description: Goals to be met by: 2/26/2023     Patient will increase functional independence with ADLs by performing:    Grooming while EOB with Supervision and Set Up Assistance.  Sitting at edge of bed x15 minutes with Supervision.  Rolling to Bilateral with Moderate Assistance.   Supine to sit with Maximum Assistance x1.                         Time Tracking:     OT Date of Treatment: 02/16/23  OT Start Time: 1146  OT Stop Time: 1221  OT Total Time (min): 35 min    Billable Minutes:Self Care/Home Management 35  min.    OT/BRYANT: BRYANT     BRYANT Visit Number: 1  Direct supervision provided by GRACIELA Toney ([id number]) for entire treatment session.   2/16/2023

## 2023-02-16 NOTE — PT/OT/SLP PROGRESS
Physical Therapy Treatment    Patient Name:  Dania Her   MRN:  [id number]    Recommendations:     Discharge Recommendations: nursing facility, skilled (with transition to nursing home)  Discharge Equipment Recommendations: hospital bed  Barriers to discharge: Inaccessible home, Decreased caregiver support, and current functional status    Assessment:     Dania Her is a 82 y.o. female admitted with a medical diagnosis of Debility.  She presents with the following impairments/functional limitations: weakness, pain, gait instability, impaired balance, impaired cardiopulmonary response to activity, impaired endurance, impaired functional mobility, impaired self care skills, decreased coordination, decreased lower extremity function, decreased upper extremity function.    Supine<>sit with totalA x 2  Scoot toward HOB with totalA x 2, in trendelenburg  Static sitting EOB x 14 mins with CGA and SBA (pt holding PTA's hand tightly 2/2 fear, not so much for balance)  Pt tolerated increased time at EOB, minimal performance of therex 2/2 joint pain and weakness  Rec SNF with transition to NH    Rehab Prognosis: Fair; patient would benefit from acute skilled PT services to address these deficits and reach maximum level of function.    Recent Surgery: * No surgery found *      Plan:     During this hospitalization, patient to be seen 3 x/week to address the identified rehab impairments via gait training, therapeutic activities, therapeutic exercises and progress toward the following goals:    Plan of Care Expires:  03/15/23    Subjective     Chief Complaint: pain - can I get pain meds for this?  Patient/Family Comments/goals: You coming back tomorrow? I like you guys.  Pain/Comfort:  Pain Rating 1: 9/10  Location - Side 1: Bilateral  Location 1: back (and knees, with mobility)  Pain Addressed 1: Reposition, Distraction, Cessation of Activity, Nurse notified  Pain Rating Post-Intervention 1: 9/10      Objective:      Communicated with nurse Murguia prior to session.  Patient found HOB elevated with peripheral IV, telemetry, PureWick, oxygen, bed alarm upon PT entry to room.     General Precautions: Standard, fall  Orthopedic Precautions: N/A  Braces: N/A  Respiratory Status: Nasal cannula, flow 1 L/min     Functional Mobility:  Bed Mobility:     Scooting: total assistance and of 2 persons  Supine to Sit: total assistance and of 2 persons  Sit to Supine: total assistance and of 2 persons      AM-PAC 6 CLICK MOBILITY  Turning over in bed (including adjusting bedclothes, sheets and blankets)?: 2  Sitting down on and standing up from a chair with arms (e.g., wheelchair, bedside commode, etc.): 1  Moving from lying on back to sitting on the side of the bed?: 2  Moving to and from a bed to a chair (including a wheelchair)?: 1  Need to walk in hospital room?: 1  Climbing 3-5 steps with a railing?: 1  Basic Mobility Total Score: 8       Treatment & Education:  Static sitting EOB x 14 mins with CGA and SBA (pt holding PTA's hand tightly 2/2 fear, not so much for balance)  Supine therex: AP (PROM on L LE), hip abd/add, heel slides (AAROM) x 10  Seated therex: LAQ (AAROM) x 10    Patient left HOB elevated with all lines intact, call button in reach, bed alarm on, and nurse Shantal present..    GOALS:   Multidisciplinary Problems       Physical Therapy Goals          Problem: Physical Therapy    Goal Priority Disciplines Outcome Goal Variances Interventions   Physical Therapy Goal     PT, PT/OT Ongoing, Progressing     Description: Goals to be met by: 3/15/2023    Patient will increase functional independence with mobility by performin. Transfer supine <> sitting EOB with minimal assist.   2. Be independent with supine home exercise program to maintain current mobility                           Time Tracking:     PT Received On: 23  PT Start Time: 1050     PT Stop Time: 1137  PT Total Time (min): 47 min     Billable  Minutes: Therapeutic Activity 30 and Therapeutic Exercise 17    Treatment Type: Treatment  PT/PTA: PTA     PTA Visit Number: 1     02/16/2023

## 2023-02-17 LAB
ERYTHROCYTE [DISTWIDTH] IN BLOOD BY AUTOMATED COUNT: 14.5 % (ref 11.5–14.5)
HCT VFR BLD AUTO: 36.8 % (ref 37–48.5)
HGB BLD-MCNC: 11.8 G/DL (ref 12–16)
MCH RBC QN AUTO: 32.6 PG (ref 27–31)
MCHC RBC AUTO-ENTMCNC: 32.1 G/DL (ref 32–36)
MCV RBC AUTO: 102 FL (ref 82–98)
PLATELET # BLD AUTO: 107 K/UL (ref 150–450)
PMV BLD AUTO: 11 FL (ref 9.2–12.9)
POCT GLUCOSE: 160 MG/DL (ref 70–110)
POCT GLUCOSE: 167 MG/DL (ref 70–110)
POCT GLUCOSE: 168 MG/DL (ref 70–110)
POCT GLUCOSE: 180 MG/DL (ref 70–110)
POCT GLUCOSE: 198 MG/DL (ref 70–110)
RBC # BLD AUTO: 3.62 M/UL (ref 4–5.4)
WBC # BLD AUTO: 5.79 K/UL (ref 3.9–12.7)

## 2023-02-17 PROCEDURE — 25000003 PHARM REV CODE 250: Performed by: PHYSICIAN ASSISTANT

## 2023-02-17 PROCEDURE — 25000003 PHARM REV CODE 250: Performed by: NURSE PRACTITIONER

## 2023-02-17 PROCEDURE — 36415 COLL VENOUS BLD VENIPUNCTURE: CPT | Performed by: INTERNAL MEDICINE

## 2023-02-17 PROCEDURE — 99232 SBSQ HOSP IP/OBS MODERATE 35: CPT | Mod: ,,, | Performed by: PHYSICIAN ASSISTANT

## 2023-02-17 PROCEDURE — 85027 COMPLETE CBC AUTOMATED: CPT | Performed by: INTERNAL MEDICINE

## 2023-02-17 PROCEDURE — 99232 PR SUBSEQUENT HOSPITAL CARE,LEVL II: ICD-10-PCS | Mod: ,,, | Performed by: PHYSICIAN ASSISTANT

## 2023-02-17 PROCEDURE — 21400001 HC TELEMETRY ROOM

## 2023-02-17 RX ORDER — GUAIFENESIN 600 MG/1
600 TABLET, EXTENDED RELEASE ORAL 2 TIMES DAILY
Status: DISCONTINUED | OUTPATIENT
Start: 2023-02-17 | End: 2023-02-23 | Stop reason: HOSPADM

## 2023-02-17 RX ADMIN — SENNOSIDES AND DOCUSATE SODIUM 1 TABLET: 50; 8.6 TABLET ORAL at 09:02

## 2023-02-17 RX ADMIN — FUROSEMIDE 20 MG: 20 TABLET ORAL at 09:02

## 2023-02-17 RX ADMIN — ANASTROZOLE 1 MG: 1 TABLET, COATED ORAL at 09:02

## 2023-02-17 RX ADMIN — ATORVASTATIN CALCIUM 80 MG: 20 TABLET, FILM COATED ORAL at 09:02

## 2023-02-17 RX ADMIN — MICONAZOLE NITRATE: 20 POWDER TOPICAL at 09:02

## 2023-02-17 RX ADMIN — OXYCODONE AND ACETAMINOPHEN 1 TABLET: 7.5; 325 TABLET ORAL at 10:02

## 2023-02-17 RX ADMIN — DICLOFENAC 4 G: 10 GEL TOPICAL at 09:02

## 2023-02-17 RX ADMIN — TRAZODONE HYDROCHLORIDE 50 MG: 50 TABLET ORAL at 09:02

## 2023-02-17 RX ADMIN — FLECAINIDE ACETATE 100 MG: 50 TABLET ORAL at 09:02

## 2023-02-17 RX ADMIN — APIXABAN 5 MG: 2.5 TABLET, FILM COATED ORAL at 09:02

## 2023-02-17 RX ADMIN — GUAIFENESIN 600 MG: 600 TABLET, EXTENDED RELEASE ORAL at 09:02

## 2023-02-17 RX ADMIN — POLYETHYLENE GLYCOL 3350 17 G: 17 POWDER, FOR SOLUTION ORAL at 09:02

## 2023-02-17 RX ADMIN — LISINOPRIL 2.5 MG: 2.5 TABLET ORAL at 09:02

## 2023-02-17 RX ADMIN — ESCITALOPRAM OXALATE 10 MG: 10 TABLET ORAL at 09:02

## 2023-02-17 RX ADMIN — OXYCODONE AND ACETAMINOPHEN 1 TABLET: 7.5; 325 TABLET ORAL at 02:02

## 2023-02-17 RX ADMIN — OXYCODONE AND ACETAMINOPHEN 1 TABLET: 7.5; 325 TABLET ORAL at 05:02

## 2023-02-17 NOTE — PROGRESS NOTES
"[facility] - [address] Medicine  Progress Note    Patient Name: Dania Her  MRN: [id number]  Patient Class: IP- Inpatient   Admission Date: 2/11/2023  Length of Stay: 2 days  Attending Physician: Enoch Sim MD  Primary Care Provider: Primary Doctor No        Subjective:     Principal Problem:Debility        HPI:  Per admitting provider:  "The patient is a 81 yo female with a past medical history of atrial fibrillation, HTN, IDDM, and pulmonary hypertension who presents for evaluation of increasing frequency of her standing intermittent chest pain over the past few to many days. She states the chest pain sometimes occurs with exertion and other times has no inciting factors. She endorses associated dyspnea with these episodes and reports worsening leg swelling. She also notes productive cough with clear mucus "when I have hot soup or pepper." She states she has had episodes of diaphoresis "and I get hot hot hot." She attributes this to her breast cancer related medication, denies any acute change, or relation to her episodes of chest pain.  Denies recent fever, chills, pain elsewhere, and other somatic complaints.  The patient has a mildly elevated troponin to .028, d-dimer 1.42.  She will be admitted for further management of her elevated troponin and Cardiology consult."    History clarified with the patient, she reports she is been slowly becoming more debilitated and for the last 2 months she is been unable to walk.  Therefore, she turned the living room into her bedroom as she is unable to go up the stairs.  She lives at home with her 83-year-old  who also has multiple medical issues.  He is unable to provide proper care for her and her children her only able to stop by every now and then.  She has a sitter who comes only 3 days per week.  The rest of the time, she has no one to change her or turn her.  She said it has become increasingly clear to her that she may need to " move into a nursing facility.  The reason she came to the hospital yesterday because her daughter decided to have a birthday party in the living room which is her bedroom.  She was upset about all the people invading her personal space and because she got upset all of her chronic aches and pains were exacerbated.  The chest pain she reported is the same pain she has had on and off since her mastectomy, she also reported back pain and knee pain.  She denied any associated nausea, jaw pain, arm pain, diaphoresis, or shortness of breath.      Overview/Hospital Course:  Ms. Her was placed in observation 2/11 due to exacerbations of her chronic chest, back, and knee pain brought on by emotional distress at not being well cared for at home. Troponin trend 0.028 >> 0.046 >> 0.028; EKG with NSR and TWA/inferolateral changes that are seen on prior studies per Care Everywhere. D-dimer 1.42 with negative CTA chest. Intermittently with nonsustained SVT and also pauses, caridology consulted: diltiazem discontinued, discussed with patient's cardiologist outpatient, PPM placement to be considered in the future.Palliative Care consulted, appreciate assistance. PT/OT following and recommending SNF.     Disposition plans: CM/SW following with plans for SNF and potential assisted NH conversion if appropriate long term.  Cardiology and PCP follow up advised at [place]; follows with Cardiology at [facility].       Interval History: seen at bedside with daughter present, awaiting placement    Review of Systems   Constitutional:  Positive for activity change and fatigue.   Cardiovascular:  Negative for chest pain.   Gastrointestinal:  Positive for constipation (chronic). Negative for abdominal pain and nausea.   Genitourinary:  Negative for difficulty urinating and dysuria.   Musculoskeletal:  Positive for arthralgias and gait problem.   Neurological:  Positive for weakness. Negative for dizziness and headaches.   Objective:     Vital Signs  (Most Recent):  Temp: 98.3 °F (36.8 °C) (02/17/23 1140)  Pulse: (!) 52 (02/17/23 1202)  Resp: 18 (02/17/23 1140)  BP: 113/63 (02/17/23 1140)  SpO2: (!) 91 % (02/17/23 1140)   Vital Signs (24h Range):  Temp:  [97.7 °F (36.5 °C)-98.6 °F (37 °C)] 98.3 °F (36.8 °C)  Pulse:  [50-73] 52  Resp:  [17-20] 18  SpO2:  [91 %-98 %] 91 %  BP: (109-130)/(57-75) 113/63     Weight: 129.1 kg (284 lb 9.8 oz)  Body mass index is 47.36 kg/m².    Intake/Output Summary (Last 24 hours) at 2/17/2023 1359  Last data filed at 2/17/2023 0805  Gross per 24 hour   Intake 100 ml   Output 1300 ml   Net -1200 ml      Physical Exam  Vitals and nursing note reviewed.   Constitutional:       General: She is not in acute distress.     Appearance: Normal appearance. She is not ill-appearing.   Cardiovascular:      Rate and Rhythm: Normal rate and regular rhythm.   Pulmonary:      Effort: Pulmonary effort is normal. No respiratory distress.   Abdominal:      General: Abdomen is flat.      Palpations: Abdomen is soft.   Musculoskeletal:      Right lower leg: No edema.      Left lower leg: No edema.   Skin:     General: Skin is warm and dry.   Neurological:      General: No focal deficit present.      Mental Status: She is alert.   Psychiatric:         Mood and Affect: Mood normal.       Significant Labs: All pertinent labs within the past 24 hours have been reviewed.    Significant Imaging: I have reviewed all pertinent imaging results/findings within the past 24 hours.      Assessment/Plan:      * Debility  Decreased functional mobility and endurance  Chronic chest wall pain  Cancer related pain  Bilateral primary osteoarthritis of knee  Chronic pain of both shoulders  -placed in observation 2/11 due to exacerbations of her chronic chest, back, and knee pain brought on by emotional distress at not being well cared for at home  -reports ongoing chronic pain that improves with PRN supportive care  -Palliative Care consulted, appreciate assistance  -PT/OT  following  -PRN supportive care   -dose/medication adjustment as appropriate   -fall precautions  -monitor   -CM/SW following with plans for SNF and potential correction NH conversion if appropriate long term    Tachy-angeline syndrome   Multiple sinus pauses at least 4 seconds, as well as nonsustained SVT  -diltiazem stopped  -will likely need pacemaker.  Discussed with Dr. Block (EP at [facility])     Pyuria  UCx showing ESBL E. Coli  Patient is afebrile, asymptomatic  Will not treat at this time      Breast cancer  -chronic, s/p mastectomy with chronic chest wall pain  -continue home anastrozole    Atrial fibrillation  History of SVT  History of DVT of lower extremity  Long term current use of anticoagulants  -chronic, controlled mostly  -remains HDS (bouts of brief, self limiting AFib/SVT on tele (chronic in nature per Care Everywhere)  -continue home diltiazem, flecainide, and apixaban  -dose/medication adjustment as appropriate   -continue tele monitoring  -EKG PRN concerns   -Cardiology and PCP follow up advised at [place]; follows with Cardiology at [facility]    Type 2 diabetes mellitus, with long-term current use of insulin  -chronic, not well controlled at home  -HgA1C 8.1  -hold home glyburide  -LDSSI initiated  -dose/medication adjustment as appropriate   -monitor accuchecks AC/HS and PRN hypoglycemic protocol     Primary hypertension  -chronic, controlled  -continue home diltiazem, flecainide, furosemide, HCTZ, and lisinopril   -dose/medication adjustment as appropriate   -monitor     Elevated troponin  -placed in observation 2/11 due to exacerbations of her chronic chest, back, and knee pain brought on by emotional distress at not being well cared for at home  -unlikely ACS  -troponin trend 0.028 >> 0.046 >> 0.028  -EKG with NSR and TWA/inferolateral changes that are seen on prior studies per Care Everywhere  -D-dimer 1.42 with negative CTA chest  -remains HDS (bouts of brief, self limiting AFib/SVT on tele (chronic in  nature per Care Everywhere)  -continue home diltiazem, flecainide, furosemide, HCTZ, and lisinopril   -dose/medication adjustment as appropriate   -EKG PRN concerns  -monitor   -Cardiology and PCP follow up advised at [place]; follows with Cardiology at [facility]      VTE Risk Mitigation (From admission, onward)         Ordered     apixaban tablet 5 mg  2 times daily         02/12/23 1207     IP VTE HIGH RISK PATIENT  Once         02/12/23 0256     Place sequential compression device  Until discontinued         02/12/23 0256                Discharge Planning   [place]: 2/17/2023     Code Status: Full Code   Is the patient medically ready for discharge?:     Reason for patient still in hospital (select all that apply): Patient trending condition  Discharge Plan A: Home with family, Home Health                  Franca Reyes PA-C  Department of Hospital Medicine   [facility] ([address])

## 2023-02-17 NOTE — PLAN OF CARE
Problem: Adult Inpatient Plan of Care  Goal: Plan of Care Review  Outcome: Ongoing, Progressing  Goal: Patient-Specific Goal (Individualized)  Outcome: Ongoing, Progressing  Goal: Absence of Hospital-Acquired Illness or Injury  Outcome: Ongoing, Progressing  Goal: Optimal Comfort and Wellbeing  Outcome: Ongoing, Progressing  Goal: Readiness for Transition of Care  Outcome: Ongoing, Progressing     Problem: Adjustment to Illness (Acute Coronary Syndrome)  Goal: Optimal Adaptation to Illness  Outcome: Ongoing, Progressing

## 2023-02-17 NOTE — PLAN OF CARE
JOSE J  attempted to reach the patient's dtr, but got her VM. The VM was not set up to take a message.     JOSE J called Rebeca at [facility], who said that she will need the patient's dtr to do the paperwork for admission. She has   Emailed her dtr the forms to fill out. She will need to to provide financial information.(Three bank statement's for admission).

## 2023-02-17 NOTE — PLAN OF CARE
JOSE J attempted to reach the patient's daughter again. Reached VM that could not take a message.    JOSE J called Rebeca again at [facility]. Rebeca stated that she was not able to reach the patient's dtr either. She called   Her and text her.  She did not call or text her back.

## 2023-02-17 NOTE — PT/OT/SLP PROGRESS
Physical Therapy      Patient Name:  Dania Her   MRN:  [id number]    Patient not seen today secondary to pt w/ nursing care at this time, and will be discharging today.

## 2023-02-17 NOTE — PLAN OF CARE
Problem: Adult Inpatient Plan of Care  Goal: Plan of Care Review  Outcome: Ongoing, Progressing  Goal: Optimal Comfort and Wellbeing  Outcome: Ongoing, Progressing     Problem: Adjustment to Illness (Acute Coronary Syndrome)  Goal: Optimal Adaptation to Illness  Outcome: Ongoing, Progressing     Problem: Dysrhythmia (Acute Coronary Syndrome)  Goal: Normalized Cardiac Rhythm  Outcome: Ongoing, Progressing     Problem: Skin Injury Risk Increased  Goal: Skin Health and Integrity  Outcome: Ongoing, Progressing     Problem: Diabetes Comorbidity  Goal: Blood Glucose Level Within Targeted Range  Outcome: Ongoing, Progressing     Problem: Coping Ineffective  Goal: Effective Coping  Outcome: Ongoing, Progressing     Problem: Bariatric Environmental Safety  Goal: Safety Maintained with Care  Outcome: Ongoing, Progressing     Problem: Infection  Goal: Absence of Infection Signs and Symptoms  Outcome: Ongoing, Progressing     Problem: Fall Injury Risk  Goal: Absence of Fall and Fall-Related Injury  Outcome: Ongoing, Progressing      Patient assessed for the following post zometa:    Dizziness   No  Lightheadedness  No      Acute nausea/vomiting No  Headache   No  Chest pain/pressure  No  Rash/itching   No  Shortness of breath  No    Patient tolerated zometa without any complications. Last vital signs:   /71   Pulse 61   Temp 97.5 °F (36.4 °C) (Oral)   Resp 18   Ht 5' 6\" (1.676 m)   Wt 146 lb 12.8 oz (66.6 kg)   SpO2 98%   BMI 23.69 kg/m²       Patient instructed if experience any of the above symptoms following today's infusion,he is to notify MD immediately or go to the emergency department. Discharge instructions given to patient. Verbalizes understanding. Ambulated off unit per self with belongings.

## 2023-02-17 NOTE — SUBJECTIVE & OBJECTIVE
Interval History: seen at bedside with daughter present, awaiting placement    Review of Systems   Constitutional:  Positive for activity change and fatigue.   Cardiovascular:  Negative for chest pain.   Gastrointestinal:  Positive for constipation (chronic). Negative for abdominal pain and nausea.   Genitourinary:  Negative for difficulty urinating and dysuria.   Musculoskeletal:  Positive for arthralgias and gait problem.   Neurological:  Positive for weakness. Negative for dizziness and headaches.   Objective:     Vital Signs (Most Recent):  Temp: 98.3 °F (36.8 °C) (02/17/23 1140)  Pulse: (!) 52 (02/17/23 1202)  Resp: 18 (02/17/23 1140)  BP: 113/63 (02/17/23 1140)  SpO2: (!) 91 % (02/17/23 1140)   Vital Signs (24h Range):  Temp:  [97.7 °F (36.5 °C)-98.6 °F (37 °C)] 98.3 °F (36.8 °C)  Pulse:  [50-73] 52  Resp:  [17-20] 18  SpO2:  [91 %-98 %] 91 %  BP: (109-130)/(57-75) 113/63     Weight: 129.1 kg (284 lb 9.8 oz)  Body mass index is 47.36 kg/m².    Intake/Output Summary (Last 24 hours) at 2/17/2023 1359  Last data filed at 2/17/2023 0805  Gross per 24 hour   Intake 100 ml   Output 1300 ml   Net -1200 ml      Physical Exam  Vitals and nursing note reviewed.   Constitutional:       General: She is not in acute distress.     Appearance: Normal appearance. She is not ill-appearing.   Cardiovascular:      Rate and Rhythm: Normal rate and regular rhythm.   Pulmonary:      Effort: Pulmonary effort is normal. No respiratory distress.   Abdominal:      General: Abdomen is flat.      Palpations: Abdomen is soft.   Musculoskeletal:      Right lower leg: No edema.      Left lower leg: No edema.   Skin:     General: Skin is warm and dry.   Neurological:      General: No focal deficit present.      Mental Status: She is alert.   Psychiatric:         Mood and Affect: Mood normal.       Significant Labs: All pertinent labs within the past 24 hours have been reviewed.    Significant Imaging: I have reviewed all pertinent imaging  results/findings within the past 24 hours.

## 2023-02-17 NOTE — CARE UPDATE
02/16/23 2011   Patient Assessment/Suction   Level of Consciousness (AVPU) alert   Respiratory Effort Normal;Unlabored   Expansion/Accessory Muscles/Retractions expansion symmetric;no retractions;no use of accessory muscles   All Lung Fields Breath Sounds clear;equal bilaterally   Rhythm/Pattern, Respiratory depth regular;pattern regular;unlabored   PRE-TX-O2   Device (Oxygen Therapy) room air   SpO2 96 %   Pulse Oximetry Type Intermittent   $ Pulse Oximetry - Multiple Charge Pulse Oximetry - Multiple

## 2023-02-17 NOTE — PLAN OF CARE
Per siria, patient medically cleared for discharge. This CM Director called [facility] for an update on the referral ([phone]). Per , admissions coordinator gone for the day. Voicemail left for other leader. Pending call back. JOSE J Pathak will continue to follow for d/c needs.

## 2023-02-17 NOTE — NURSING
Patient complained of nose bleed. Streak of blood on towel mixed with nasal drainage when she blew her nose. Patient stated she has had blood when she blows her nose for over a year and her Pcp is aware. Patient refused humidity with 2L O2. DOLLY Marin notified. PA stated to wean patient off O2 as tolerated.     Patient in bed watching tv. Denies needs at this time. No acute distress noted. Will continue to monitor and support patient.

## 2023-02-18 LAB
POCT GLUCOSE: 166 MG/DL (ref 70–110)
POCT GLUCOSE: 168 MG/DL (ref 70–110)
POCT GLUCOSE: 178 MG/DL (ref 70–110)
POCT GLUCOSE: 207 MG/DL (ref 70–110)

## 2023-02-18 PROCEDURE — 25000003 PHARM REV CODE 250: Performed by: NURSE PRACTITIONER

## 2023-02-18 PROCEDURE — 99232 SBSQ HOSP IP/OBS MODERATE 35: CPT | Mod: ,,, | Performed by: PHYSICIAN ASSISTANT

## 2023-02-18 PROCEDURE — 25000003 PHARM REV CODE 250: Performed by: PHYSICIAN ASSISTANT

## 2023-02-18 PROCEDURE — 21400001 HC TELEMETRY ROOM

## 2023-02-18 PROCEDURE — 99232 PR SUBSEQUENT HOSPITAL CARE,LEVL II: ICD-10-PCS | Mod: ,,, | Performed by: PHYSICIAN ASSISTANT

## 2023-02-18 PROCEDURE — 99900035 HC TECH TIME PER 15 MIN (STAT)

## 2023-02-18 RX ADMIN — FUROSEMIDE 20 MG: 20 TABLET ORAL at 09:02

## 2023-02-18 RX ADMIN — MICONAZOLE NITRATE: 20 POWDER TOPICAL at 09:02

## 2023-02-18 RX ADMIN — POLYETHYLENE GLYCOL 3350 17 G: 17 POWDER, FOR SOLUTION ORAL at 08:02

## 2023-02-18 RX ADMIN — ESCITALOPRAM OXALATE 10 MG: 10 TABLET ORAL at 08:02

## 2023-02-18 RX ADMIN — ANASTROZOLE 1 MG: 1 TABLET, COATED ORAL at 08:02

## 2023-02-18 RX ADMIN — MICONAZOLE NITRATE: 20 POWDER TOPICAL at 08:02

## 2023-02-18 RX ADMIN — SENNOSIDES AND DOCUSATE SODIUM 1 TABLET: 50; 8.6 TABLET ORAL at 09:02

## 2023-02-18 RX ADMIN — GUAIFENESIN 600 MG: 600 TABLET, EXTENDED RELEASE ORAL at 08:02

## 2023-02-18 RX ADMIN — APIXABAN 5 MG: 2.5 TABLET, FILM COATED ORAL at 09:02

## 2023-02-18 RX ADMIN — FLECAINIDE ACETATE 100 MG: 50 TABLET ORAL at 09:02

## 2023-02-18 RX ADMIN — TRAZODONE HYDROCHLORIDE 50 MG: 50 TABLET ORAL at 09:02

## 2023-02-18 RX ADMIN — SENNOSIDES AND DOCUSATE SODIUM 1 TABLET: 50; 8.6 TABLET ORAL at 08:02

## 2023-02-18 RX ADMIN — DICLOFENAC 4 G: 10 GEL TOPICAL at 08:02

## 2023-02-18 RX ADMIN — GUAIFENESIN 600 MG: 600 TABLET, EXTENDED RELEASE ORAL at 09:02

## 2023-02-18 RX ADMIN — INSULIN ASPART 2 UNITS: 100 INJECTION, SOLUTION INTRAVENOUS; SUBCUTANEOUS at 01:02

## 2023-02-18 RX ADMIN — FUROSEMIDE 20 MG: 20 TABLET ORAL at 08:02

## 2023-02-18 RX ADMIN — ATORVASTATIN CALCIUM 80 MG: 20 TABLET, FILM COATED ORAL at 08:02

## 2023-02-18 RX ADMIN — FLECAINIDE ACETATE 100 MG: 50 TABLET ORAL at 08:02

## 2023-02-18 RX ADMIN — APIXABAN 5 MG: 2.5 TABLET, FILM COATED ORAL at 08:02

## 2023-02-18 RX ADMIN — OXYCODONE AND ACETAMINOPHEN 1 TABLET: 7.5; 325 TABLET ORAL at 09:02

## 2023-02-18 RX ADMIN — LISINOPRIL 2.5 MG: 2.5 TABLET ORAL at 08:02

## 2023-02-18 NOTE — PROGRESS NOTES
"[facility] - [address] Medicine  Progress Note    Patient Name: Dania Her  MRN: [id number]  Patient Class: IP- Inpatient   Admission Date: 2/11/2023  Length of Stay: 3 days  Attending Physician: Enoch Sim MD  Primary Care Provider: Primary Doctor No        Subjective:     Principal Problem:Debility        HPI:  Per admitting provider:  "The patient is a 81 yo female with a past medical history of atrial fibrillation, HTN, IDDM, and pulmonary hypertension who presents for evaluation of increasing frequency of her standing intermittent chest pain over the past few to many days. She states the chest pain sometimes occurs with exertion and other times has no inciting factors. She endorses associated dyspnea with these episodes and reports worsening leg swelling. She also notes productive cough with clear mucus "when I have hot soup or pepper." She states she has had episodes of diaphoresis "and I get hot hot hot." She attributes this to her breast cancer related medication, denies any acute change, or relation to her episodes of chest pain.  Denies recent fever, chills, pain elsewhere, and other somatic complaints.  The patient has a mildly elevated troponin to .028, d-dimer 1.42.  She will be admitted for further management of her elevated troponin and Cardiology consult."    History clarified with the patient, she reports she is been slowly becoming more debilitated and for the last 2 months she is been unable to walk.  Therefore, she turned the living room into her bedroom as she is unable to go up the stairs.  She lives at home with her 83-year-old  who also has multiple medical issues.  He is unable to provide proper care for her and her children her only able to stop by every now and then.  She has a sitter who comes only 3 days per week.  The rest of the time, she has no one to change her or turn her.  She said it has become increasingly clear to her that she may need to " move into a nursing facility.  The reason she came to the hospital yesterday because her daughter decided to have a birthday party in the living room which is her bedroom.  She was upset about all the people invading her personal space and because she got upset all of her chronic aches and pains were exacerbated.  The chest pain she reported is the same pain she has had on and off since her mastectomy, she also reported back pain and knee pain.  She denied any associated nausea, jaw pain, arm pain, diaphoresis, or shortness of breath.      Overview/Hospital Course:  Ms. Her was placed in observation 2/11 due to exacerbations of her chronic chest, back, and knee pain brought on by emotional distress at not being well cared for at home. Troponin trend 0.028 >> 0.046 >> 0.028; EKG with NSR and TWA/inferolateral changes that are seen on prior studies per Care Everywhere. D-dimer 1.42 with negative CTA chest. Intermittently with nonsustained SVT and also pauses, caridology consulted: diltiazem discontinued, discussed with patient's cardiologist outpatient, PPM placement to be considered in the future.Palliative Care consulted, appreciate assistance. PT/OT following and recommending SNF.     Disposition plans: CM/SW following with plans for SNF and potential intermediate NH conversion if appropriate long term.  Cardiology and PCP follow up advised at [place]; follows with Cardiology at [facility].       Interval History: Seen at bedside, no new complaints. Awaiting placement    Review of Systems   Constitutional:  Positive for activity change and fatigue.   Cardiovascular:  Negative for chest pain.   Gastrointestinal:  Positive for constipation (chronic). Negative for abdominal pain.   Musculoskeletal:  Positive for arthralgias and gait problem.   Neurological:  Positive for weakness. Negative for headaches.   Objective:     Vital Signs (Most Recent):  Temp: 98 °F (36.7 °C) (02/18/23 1213)  Pulse: (!) 58 (02/18/23 1213)  Resp: 16  (02/18/23 1213)  BP: 134/75 (02/18/23 1213)  SpO2: 100 % (02/18/23 1213)   Vital Signs (24h Range):  Temp:  [96.8 °F (36 °C)-98.5 °F (36.9 °C)] 98 °F (36.7 °C)  Pulse:  [48-77] 58  Resp:  [16-18] 16  SpO2:  [89 %-100 %] 100 %  BP: (106-134)/(57-75) 134/75     Weight: 129.1 kg (284 lb 9.8 oz)  Body mass index is 47.36 kg/m².    Intake/Output Summary (Last 24 hours) at 2/18/2023 1221  Last data filed at 2/18/2023 1218  Gross per 24 hour   Intake 360 ml   Output 1100 ml   Net -740 ml      Physical Exam  Vitals and nursing note reviewed.   Constitutional:       General: She is not in acute distress.     Appearance: Normal appearance. She is not ill-appearing.   Cardiovascular:      Rate and Rhythm: Normal rate and regular rhythm.   Pulmonary:      Effort: Pulmonary effort is normal. No respiratory distress.   Abdominal:      General: Abdomen is flat.      Palpations: Abdomen is soft.   Musculoskeletal:      Right lower leg: No edema.      Left lower leg: No edema.   Skin:     General: Skin is warm and dry.   Neurological:      General: No focal deficit present.      Mental Status: She is alert.   Psychiatric:         Mood and Affect: Mood normal.       Significant Labs: All pertinent labs within the past 24 hours have been reviewed.    Significant Imaging: I have reviewed all pertinent imaging results/findings within the past 24 hours.      Assessment/Plan:      * Debility  Decreased functional mobility and endurance  Chronic chest wall pain  Cancer related pain  Bilateral primary osteoarthritis of knee  Chronic pain of both shoulders  -placed in observation 2/11 due to exacerbations of her chronic chest, back, and knee pain brought on by emotional distress at not being well cared for at home  -reports ongoing chronic pain that improves with PRN supportive care  -Palliative Care consulted, appreciate assistance  -PT/OT following  -PRN supportive care   -dose/medication adjustment as appropriate   -fall  precautions  -monitor   -CM/SW following with plans for SNF and potential longterm NH conversion if appropriate long term    Tachy-angeline syndrome   Multiple sinus pauses at least 4 seconds, as well as nonsustained SVT  -diltiazem stopped  -will likely need pacemaker.  Discussed with Dr. Block (EP at [facility])     Pyuria  UCx showing ESBL E. Coli  Patient is afebrile, asymptomatic  Will not treat at this time      Breast cancer  -chronic, s/p mastectomy with chronic chest wall pain  -continue home anastrozole    Atrial fibrillation  History of SVT  History of DVT of lower extremity  Long term current use of anticoagulants  -chronic, controlled mostly  -remains HDS (bouts of brief, self limiting AFib/SVT on tele (chronic in nature per Care Everywhere)  -continue home diltiazem, flecainide, and apixaban  -dose/medication adjustment as appropriate   -continue tele monitoring  -EKG PRN concerns   -Cardiology and PCP follow up advised at [place]; follows with Cardiology at [facility]    Type 2 diabetes mellitus, with long-term current use of insulin  -chronic, not well controlled at home  -HgA1C 8.1  -hold home glyburide  -LDSSI initiated  -dose/medication adjustment as appropriate   -monitor accuchecks AC/HS and PRN hypoglycemic protocol     Primary hypertension  -chronic, controlled  -continue home diltiazem, flecainide, furosemide, HCTZ, and lisinopril   -dose/medication adjustment as appropriate   -monitor     Elevated troponin  -placed in observation 2/11 due to exacerbations of her chronic chest, back, and knee pain brought on by emotional distress at not being well cared for at home  -unlikely ACS  -troponin trend 0.028 >> 0.046 >> 0.028  -EKG with NSR and TWA/inferolateral changes that are seen on prior studies per Care Everywhere  -D-dimer 1.42 with negative CTA chest  -remains HDS (bouts of brief, self limiting AFib/SVT on tele (chronic in nature per Care Everywhere)  -continue home diltiazem, flecainide, furosemide, HCTZ,  and lisinopril   -dose/medication adjustment as appropriate   -EKG PRN concerns  -monitor   -Cardiology and PCP follow up advised at [place]; follows with Cardiology at [facility]      VTE Risk Mitigation (From admission, onward)         Ordered     apixaban tablet 5 mg  2 times daily         02/12/23 1207     IP VTE HIGH RISK PATIENT  Once         02/12/23 0256     Place sequential compression device  Until discontinued         02/12/23 0256                Discharge Planning   [place]: 2/17/2023     Code Status: Full Code   Is the patient medically ready for discharge?:     Reason for patient still in hospital (select all that apply): Patient trending condition  Discharge Plan A: Home with family, Home Health                  Franca Reyes PA-C  Department of Hospital Medicine   [facility] - Med Surg ([address])

## 2023-02-18 NOTE — SUBJECTIVE & OBJECTIVE
Interval History: Seen at bedside, no new complaints. Awaiting placement    Review of Systems   Constitutional:  Positive for activity change and fatigue.   Cardiovascular:  Negative for chest pain.   Gastrointestinal:  Positive for constipation (chronic). Negative for abdominal pain.   Musculoskeletal:  Positive for arthralgias and gait problem.   Neurological:  Positive for weakness. Negative for headaches.   Objective:     Vital Signs (Most Recent):  Temp: 98 °F (36.7 °C) (02/18/23 1213)  Pulse: (!) 58 (02/18/23 1213)  Resp: 16 (02/18/23 1213)  BP: 134/75 (02/18/23 1213)  SpO2: 100 % (02/18/23 1213)   Vital Signs (24h Range):  Temp:  [96.8 °F (36 °C)-98.5 °F (36.9 °C)] 98 °F (36.7 °C)  Pulse:  [48-77] 58  Resp:  [16-18] 16  SpO2:  [89 %-100 %] 100 %  BP: (106-134)/(57-75) 134/75     Weight: 129.1 kg (284 lb 9.8 oz)  Body mass index is 47.36 kg/m².    Intake/Output Summary (Last 24 hours) at 2/18/2023 1221  Last data filed at 2/18/2023 1218  Gross per 24 hour   Intake 360 ml   Output 1100 ml   Net -740 ml      Physical Exam  Vitals and nursing note reviewed.   Constitutional:       General: She is not in acute distress.     Appearance: Normal appearance. She is not ill-appearing.   Cardiovascular:      Rate and Rhythm: Normal rate and regular rhythm.   Pulmonary:      Effort: Pulmonary effort is normal. No respiratory distress.   Abdominal:      General: Abdomen is flat.      Palpations: Abdomen is soft.   Musculoskeletal:      Right lower leg: No edema.      Left lower leg: No edema.   Skin:     General: Skin is warm and dry.   Neurological:      General: No focal deficit present.      Mental Status: She is alert.   Psychiatric:         Mood and Affect: Mood normal.       Significant Labs: All pertinent labs within the past 24 hours have been reviewed.    Significant Imaging: I have reviewed all pertinent imaging results/findings within the past 24 hours.

## 2023-02-18 NOTE — PLAN OF CARE
No significant events overnight. AAOx4. VSS. Poc reviewed with pt and questions answered. Tele monitoring maintained. Denies sob, receiving 1L O2 via NC; sat's 100%. Good appetite and tolerating diet well. BS monitoring maintained and WNL. Wound to sacrum cleansed and Triad paste applied as ordered. Voiding via pure wick adequately, concentrated urine noted. Pt repositioned multiple times throughout shift. Prn Percocet given with c/o pain. Pt remains free from falls, injury, and skin breakdown. All needs met and purposeful rounding done. Safety maintained and call light in reach. Will continue to monitor

## 2023-02-19 LAB
POCT GLUCOSE: 179 MG/DL (ref 70–110)
POCT GLUCOSE: 215 MG/DL (ref 70–110)
POCT GLUCOSE: 217 MG/DL (ref 70–110)
POCT GLUCOSE: 222 MG/DL (ref 70–110)

## 2023-02-19 PROCEDURE — 25000003 PHARM REV CODE 250: Performed by: NURSE PRACTITIONER

## 2023-02-19 PROCEDURE — 30200315 PPD INTRADERMAL TEST REV CODE 302: Performed by: PHYSICIAN ASSISTANT

## 2023-02-19 PROCEDURE — 99232 SBSQ HOSP IP/OBS MODERATE 35: CPT | Mod: ,,, | Performed by: PHYSICIAN ASSISTANT

## 2023-02-19 PROCEDURE — 25000003 PHARM REV CODE 250: Performed by: PHYSICIAN ASSISTANT

## 2023-02-19 PROCEDURE — 99232 PR SUBSEQUENT HOSPITAL CARE,LEVL II: ICD-10-PCS | Mod: ,,, | Performed by: PHYSICIAN ASSISTANT

## 2023-02-19 PROCEDURE — 86580 TB INTRADERMAL TEST: CPT | Performed by: PHYSICIAN ASSISTANT

## 2023-02-19 PROCEDURE — 94761 N-INVAS EAR/PLS OXIMETRY MLT: CPT

## 2023-02-19 PROCEDURE — 21400001 HC TELEMETRY ROOM

## 2023-02-19 RX ORDER — TRAMADOL HYDROCHLORIDE 50 MG/1
50 TABLET ORAL EVERY 6 HOURS PRN
Status: DISCONTINUED | OUTPATIENT
Start: 2023-02-19 | End: 2023-02-20

## 2023-02-19 RX ADMIN — DICLOFENAC 4 G: 10 GEL TOPICAL at 09:02

## 2023-02-19 RX ADMIN — FUROSEMIDE 20 MG: 20 TABLET ORAL at 09:02

## 2023-02-19 RX ADMIN — SENNOSIDES AND DOCUSATE SODIUM 1 TABLET: 50; 8.6 TABLET ORAL at 08:02

## 2023-02-19 RX ADMIN — MICONAZOLE NITRATE: 20 POWDER TOPICAL at 09:02

## 2023-02-19 RX ADMIN — FLECAINIDE ACETATE 100 MG: 50 TABLET ORAL at 09:02

## 2023-02-19 RX ADMIN — APIXABAN 5 MG: 2.5 TABLET, FILM COATED ORAL at 09:02

## 2023-02-19 RX ADMIN — ATORVASTATIN CALCIUM 80 MG: 20 TABLET, FILM COATED ORAL at 09:02

## 2023-02-19 RX ADMIN — TUBERCULIN PURIFIED PROTEIN DERIVATIVE 5 UNITS: 5 INJECTION, SOLUTION INTRADERMAL at 04:02

## 2023-02-19 RX ADMIN — POLYETHYLENE GLYCOL 3350 17 G: 17 POWDER, FOR SOLUTION ORAL at 09:02

## 2023-02-19 RX ADMIN — TRAZODONE HYDROCHLORIDE 50 MG: 50 TABLET ORAL at 08:02

## 2023-02-19 RX ADMIN — INSULIN ASPART 2 UNITS: 100 INJECTION, SOLUTION INTRAVENOUS; SUBCUTANEOUS at 06:02

## 2023-02-19 RX ADMIN — SENNOSIDES AND DOCUSATE SODIUM 1 TABLET: 50; 8.6 TABLET ORAL at 09:02

## 2023-02-19 RX ADMIN — APIXABAN 5 MG: 2.5 TABLET, FILM COATED ORAL at 08:02

## 2023-02-19 RX ADMIN — GUAIFENESIN 600 MG: 600 TABLET, EXTENDED RELEASE ORAL at 09:02

## 2023-02-19 RX ADMIN — ESCITALOPRAM OXALATE 10 MG: 10 TABLET ORAL at 09:02

## 2023-02-19 RX ADMIN — FLECAINIDE ACETATE 100 MG: 50 TABLET ORAL at 08:02

## 2023-02-19 RX ADMIN — FUROSEMIDE 20 MG: 20 TABLET ORAL at 08:02

## 2023-02-19 RX ADMIN — LISINOPRIL 2.5 MG: 2.5 TABLET ORAL at 09:02

## 2023-02-19 RX ADMIN — TRAMADOL HYDROCHLORIDE 50 MG: 50 TABLET, COATED ORAL at 04:02

## 2023-02-19 RX ADMIN — GUAIFENESIN 600 MG: 600 TABLET, EXTENDED RELEASE ORAL at 08:02

## 2023-02-19 RX ADMIN — OXYCODONE AND ACETAMINOPHEN 1 TABLET: 7.5; 325 TABLET ORAL at 09:02

## 2023-02-19 RX ADMIN — ANASTROZOLE 1 MG: 1 TABLET, COATED ORAL at 09:02

## 2023-02-19 RX ADMIN — INSULIN ASPART 1 UNITS: 100 INJECTION, SOLUTION INTRAVENOUS; SUBCUTANEOUS at 08:02

## 2023-02-19 NOTE — PROGRESS NOTES
"[facility] - [address] Medicine  Progress Note    Patient Name: Dania Her  MRN: [id number]  Patient Class: IP- Inpatient   Admission Date: 2/11/2023  Length of Stay: 4 days  Attending Physician: Enoch Sim MD  Primary Care Provider: Primary Doctor No        Subjective:     Principal Problem:Debility        HPI:  Per admitting provider:  "The patient is a 81 yo female with a past medical history of atrial fibrillation, HTN, IDDM, and pulmonary hypertension who presents for evaluation of increasing frequency of her standing intermittent chest pain over the past few to many days. She states the chest pain sometimes occurs with exertion and other times has no inciting factors. She endorses associated dyspnea with these episodes and reports worsening leg swelling. She also notes productive cough with clear mucus "when I have hot soup or pepper." She states she has had episodes of diaphoresis "and I get hot hot hot." She attributes this to her breast cancer related medication, denies any acute change, or relation to her episodes of chest pain.  Denies recent fever, chills, pain elsewhere, and other somatic complaints.  The patient has a mildly elevated troponin to .028, d-dimer 1.42.  She will be admitted for further management of her elevated troponin and Cardiology consult."    History clarified with the patient, she reports she is been slowly becoming more debilitated and for the last 2 months she is been unable to walk.  Therefore, she turned the living room into her bedroom as she is unable to go up the stairs.  She lives at home with her 83-year-old  who also has multiple medical issues.  He is unable to provide proper care for her and her children her only able to stop by every now and then.  She has a sitter who comes only 3 days per week.  The rest of the time, she has no one to change her or turn her.  She said it has become increasingly clear to her that she may need to " move into a nursing facility.  The reason she came to the hospital yesterday because her daughter decided to have a birthday party in the living room which is her bedroom.  She was upset about all the people invading her personal space and because she got upset all of her chronic aches and pains were exacerbated.  The chest pain she reported is the same pain she has had on and off since her mastectomy, she also reported back pain and knee pain.  She denied any associated nausea, jaw pain, arm pain, diaphoresis, or shortness of breath.      Overview/Hospital Course:  Ms. Her was placed in observation 2/11 due to exacerbations of her chronic chest, back, and knee pain brought on by emotional distress at not being well cared for at home. Troponin trend 0.028 >> 0.046 >> 0.028; EKG with NSR and TWA/inferolateral changes that are seen on prior studies per Care Everywhere. D-dimer 1.42 with negative CTA chest. Intermittently with nonsustained SVT and also pauses, caridology consulted: diltiazem discontinued, discussed with patient's cardiologist outpatient, PPM placement to be considered in the future.Palliative Care consulted, appreciate assistance. PT/OT following and recommending SNF.     Disposition plans: CM/SW following with plans for SNF and potential senior living NH conversion if appropriate long term.  Cardiology and PCP follow up advised at [place]; follows with Cardiology at [facility].       Interval History: Seen at bedside, sleepy. Reviewed MAR and discontinued opioid medications   Awaiting placmeent    Review of Systems   Constitutional:  Positive for activity change and fatigue.   Cardiovascular:  Negative for chest pain.   Gastrointestinal:  Positive for constipation (chronic). Negative for abdominal pain.   Musculoskeletal:  Positive for arthralgias and gait problem.   Neurological:  Positive for weakness. Negative for headaches.   Objective:     Vital Signs (Most Recent):  Temp: 97.6 °F (36.4 °C) (02/19/23  0753)  Pulse: (!) 56 (02/19/23 1000)  Resp: 18 (02/19/23 0933)  BP: 123/71 (02/19/23 0753)  SpO2: 99 % (02/19/23 0753)   Vital Signs (24h Range):  Temp:  [97.6 °F (36.4 °C)-98.4 °F (36.9 °C)] 97.6 °F (36.4 °C)  Pulse:  [48-62] 56  Resp:  [16-18] 18  SpO2:  [98 %-100 %] 99 %  BP: (113-134)/(57-75) 123/71     Weight: 129.1 kg (284 lb 9.8 oz)  Body mass index is 47.36 kg/m².    Intake/Output Summary (Last 24 hours) at 2/19/2023 1121  Last data filed at 2/19/2023 0730  Gross per 24 hour   Intake 600 ml   Output 2400 ml   Net -1800 ml      Physical Exam  Vitals and nursing note reviewed.   Constitutional:       General: She is not in acute distress.     Appearance: Normal appearance. She is not ill-appearing.   Cardiovascular:      Rate and Rhythm: Normal rate and regular rhythm.   Pulmonary:      Effort: Pulmonary effort is normal. No respiratory distress.   Abdominal:      General: Abdomen is flat.      Palpations: Abdomen is soft.   Musculoskeletal:      Right lower leg: No edema.      Left lower leg: No edema.   Skin:     General: Skin is warm and dry.   Neurological:      General: No focal deficit present.      Mental Status: She is alert.   Psychiatric:         Mood and Affect: Mood normal.       Significant Labs: All pertinent labs within the past 24 hours have been reviewed.    Significant Imaging: I have reviewed all pertinent imaging results/findings within the past 24 hours.      Assessment/Plan:      * Debility  Decreased functional mobility and endurance  Chronic chest wall pain  Cancer related pain  Bilateral primary osteoarthritis of knee  Chronic pain of both shoulders  -placed in observation 2/11 due to exacerbations of her chronic chest, back, and knee pain brought on by emotional distress at not being well cared for at home  -reports ongoing chronic pain that improves with PRN supportive care  -Palliative Care consulted, appreciate assistance  -PT/OT following  -PRN supportive care   -dose/medication  adjustment as appropriate   -fall precautions  -monitor   -CM/SW following with plans for SNF and potential group home NH conversion if appropriate long term    Tachy-angeline syndrome   Multiple sinus pauses at least 4 seconds, as well as nonsustained SVT  -diltiazem stopped  -will likely need pacemaker.  Discussed with Dr. Block (EP at [facility])     Pyuria  UCx showing ESBL E. Coli  Patient is afebrile, asymptomatic  Will not treat at this time      Breast cancer  -chronic, s/p mastectomy with chronic chest wall pain  -continue home anastrozole    Atrial fibrillation  History of SVT  History of DVT of lower extremity  Long term current use of anticoagulants  -chronic, controlled mostly  -remains HDS (bouts of brief, self limiting AFib/SVT on tele (chronic in nature per Care Everywhere)  -continue home diltiazem, flecainide, and apixaban  -dose/medication adjustment as appropriate   -continue tele monitoring  -EKG PRN concerns   -Cardiology and PCP follow up advised at [place]; follows with Cardiology at [facility]    Type 2 diabetes mellitus, with long-term current use of insulin  -chronic, not well controlled at home  -HgA1C 8.1  -hold home glyburide  -LDSSI initiated  -dose/medication adjustment as appropriate   -monitor accuchecks AC/HS and PRN hypoglycemic protocol     Primary hypertension  -chronic, controlled  -continue home diltiazem, flecainide, furosemide, HCTZ, and lisinopril   -dose/medication adjustment as appropriate   -monitor     Elevated troponin  -placed in observation 2/11 due to exacerbations of her chronic chest, back, and knee pain brought on by emotional distress at not being well cared for at home  -unlikely ACS  -troponin trend 0.028 >> 0.046 >> 0.028  -EKG with NSR and TWA/inferolateral changes that are seen on prior studies per Care Everywhere  -D-dimer 1.42 with negative CTA chest  -remains HDS (bouts of brief, self limiting AFib/SVT on tele (chronic in nature per Care Everywhere)  -continue home  diltiazem, flecainide, furosemide, HCTZ, and lisinopril   -dose/medication adjustment as appropriate   -EKG PRN concerns  -monitor   -Cardiology and PCP follow up advised at [place]; follows with Cardiology at [facility]      VTE Risk Mitigation (From admission, onward)         Ordered     apixaban tablet 5 mg  2 times daily         02/12/23 1207     IP VTE HIGH RISK PATIENT  Once         02/12/23 0256     Place sequential compression device  Until discontinued         02/12/23 0256                Discharge Planning   [place]: 2/17/2023     Code Status: Full Code   Is the patient medically ready for discharge?:     Reason for patient still in hospital (select all that apply): Pending disposition  Discharge Plan A: Home with family, Home Health                  Franca Reyes PA-C  Department of Hospital Medicine   [facility] - Med Surg ([address])

## 2023-02-19 NOTE — PLAN OF CARE
"Spoke with daughter Barbie regarding pending transfer to [facility] - she reports receiving an email from Rebeca with consents to sign but was unable to open as she was "missing a pass code" - daughter will contact [facility] tomorrow am to complete admission paperwork   "

## 2023-02-19 NOTE — ASSESSMENT & PLAN NOTE
Decreased functional mobility and endurance  Chronic chest wall pain  Cancer related pain  Bilateral primary osteoarthritis of knee  Chronic pain of both shoulders  -placed in observation 2/11 due to exacerbations of her chronic chest, back, and knee pain brought on by emotional distress at not being well cared for at home  -reports ongoing chronic pain that improves with PRN supportive care  -Palliative Care consulted, appreciate assistance  -PT/OT following  -PRN supportive care   -dose/medication adjustment as appropriate   -fall precautions  -monitor   -CM/SW following with plans for SNF and potential [facility] NH conversion if appropriate long term

## 2023-02-19 NOTE — SUBJECTIVE & OBJECTIVE
Interval History: Seen at bedside, sleepy. Reviewed MAR and discontinued opioid medications   Awaiting placmeent    Review of Systems   Constitutional:  Positive for activity change and fatigue.   Cardiovascular:  Negative for chest pain.   Gastrointestinal:  Positive for constipation (chronic). Negative for abdominal pain.   Musculoskeletal:  Positive for arthralgias and gait problem.   Neurological:  Positive for weakness. Negative for headaches.   Objective:     Vital Signs (Most Recent):  Temp: 97.6 °F (36.4 °C) (02/19/23 0753)  Pulse: (!) 56 (02/19/23 1000)  Resp: 18 (02/19/23 0933)  BP: 123/71 (02/19/23 0753)  SpO2: 99 % (02/19/23 0753)   Vital Signs (24h Range):  Temp:  [97.6 °F (36.4 °C)-98.4 °F (36.9 °C)] 97.6 °F (36.4 °C)  Pulse:  [48-62] 56  Resp:  [16-18] 18  SpO2:  [98 %-100 %] 99 %  BP: (113-134)/(57-75) 123/71     Weight: 129.1 kg (284 lb 9.8 oz)  Body mass index is 47.36 kg/m².    Intake/Output Summary (Last 24 hours) at 2/19/2023 1121  Last data filed at 2/19/2023 0730  Gross per 24 hour   Intake 600 ml   Output 2400 ml   Net -1800 ml      Physical Exam  Vitals and nursing note reviewed.   Constitutional:       General: She is not in acute distress.     Appearance: Normal appearance. She is not ill-appearing.   Cardiovascular:      Rate and Rhythm: Normal rate and regular rhythm.   Pulmonary:      Effort: Pulmonary effort is normal. No respiratory distress.   Abdominal:      General: Abdomen is flat.      Palpations: Abdomen is soft.   Musculoskeletal:      Right lower leg: No edema.      Left lower leg: No edema.   Skin:     General: Skin is warm and dry.   Neurological:      General: No focal deficit present.      Mental Status: She is alert.   Psychiatric:         Mood and Affect: Mood normal.       Significant Labs: All pertinent labs within the past 24 hours have been reviewed.    Significant Imaging: I have reviewed all pertinent imaging results/findings within the past 24 hours.

## 2023-02-19 NOTE — PROGRESS NOTES
Patient does not want CPAP, states she is doing ok without it.  Instructed patient to call if she changes her mind and wants to wear it.

## 2023-02-20 LAB
POCT GLUCOSE: 177 MG/DL (ref 70–110)
POCT GLUCOSE: 190 MG/DL (ref 70–110)
POCT GLUCOSE: 218 MG/DL (ref 70–110)
POCT GLUCOSE: 223 MG/DL (ref 70–110)

## 2023-02-20 PROCEDURE — 21400001 HC TELEMETRY ROOM

## 2023-02-20 PROCEDURE — 99900035 HC TECH TIME PER 15 MIN (STAT)

## 2023-02-20 PROCEDURE — 25000003 PHARM REV CODE 250: Performed by: PHYSICIAN ASSISTANT

## 2023-02-20 PROCEDURE — 99233 PR SUBSEQUENT HOSPITAL CARE,LEVL III: ICD-10-PCS | Mod: ,,, | Performed by: PHYSICIAN ASSISTANT

## 2023-02-20 PROCEDURE — 97535 SELF CARE MNGMENT TRAINING: CPT | Mod: CO

## 2023-02-20 PROCEDURE — 25000003 PHARM REV CODE 250: Performed by: NURSE PRACTITIONER

## 2023-02-20 PROCEDURE — 99233 SBSQ HOSP IP/OBS HIGH 50: CPT | Mod: ,,, | Performed by: PHYSICIAN ASSISTANT

## 2023-02-20 PROCEDURE — 97530 THERAPEUTIC ACTIVITIES: CPT | Mod: CQ

## 2023-02-20 PROCEDURE — 97110 THERAPEUTIC EXERCISES: CPT | Mod: CQ

## 2023-02-20 RX ORDER — HYDROCODONE BITARTRATE AND ACETAMINOPHEN 5; 325 MG/1; MG/1
1 TABLET ORAL 2 TIMES DAILY PRN
Status: DISCONTINUED | OUTPATIENT
Start: 2023-02-20 | End: 2023-02-23 | Stop reason: HOSPADM

## 2023-02-20 RX ORDER — TRAMADOL HYDROCHLORIDE 50 MG/1
50 TABLET ORAL EVERY 6 HOURS PRN
Status: DISCONTINUED | OUTPATIENT
Start: 2023-02-20 | End: 2023-02-23 | Stop reason: HOSPADM

## 2023-02-20 RX ADMIN — FUROSEMIDE 20 MG: 20 TABLET ORAL at 09:02

## 2023-02-20 RX ADMIN — DICLOFENAC 4 G: 10 GEL TOPICAL at 10:02

## 2023-02-20 RX ADMIN — APIXABAN 5 MG: 2.5 TABLET, FILM COATED ORAL at 10:02

## 2023-02-20 RX ADMIN — ATORVASTATIN CALCIUM 80 MG: 20 TABLET, FILM COATED ORAL at 10:02

## 2023-02-20 RX ADMIN — TRAZODONE HYDROCHLORIDE 50 MG: 50 TABLET ORAL at 09:02

## 2023-02-20 RX ADMIN — GUAIFENESIN 600 MG: 600 TABLET, EXTENDED RELEASE ORAL at 09:02

## 2023-02-20 RX ADMIN — TRAMADOL HYDROCHLORIDE 50 MG: 50 TABLET, COATED ORAL at 10:02

## 2023-02-20 RX ADMIN — LISINOPRIL 2.5 MG: 2.5 TABLET ORAL at 10:02

## 2023-02-20 RX ADMIN — TRAMADOL HYDROCHLORIDE 50 MG: 50 TABLET, COATED ORAL at 06:02

## 2023-02-20 RX ADMIN — HYDROCODONE BITARTRATE AND ACETAMINOPHEN 1 TABLET: 5; 325 TABLET ORAL at 10:02

## 2023-02-20 RX ADMIN — FLECAINIDE ACETATE 100 MG: 50 TABLET ORAL at 09:02

## 2023-02-20 RX ADMIN — MICONAZOLE NITRATE: 20 POWDER TOPICAL at 10:02

## 2023-02-20 RX ADMIN — APIXABAN 5 MG: 2.5 TABLET, FILM COATED ORAL at 09:02

## 2023-02-20 RX ADMIN — ANASTROZOLE 1 MG: 1 TABLET, COATED ORAL at 10:02

## 2023-02-20 RX ADMIN — POLYETHYLENE GLYCOL 3350 17 G: 17 POWDER, FOR SOLUTION ORAL at 10:02

## 2023-02-20 RX ADMIN — INSULIN ASPART 1 UNITS: 100 INJECTION, SOLUTION INTRAVENOUS; SUBCUTANEOUS at 11:02

## 2023-02-20 RX ADMIN — SENNOSIDES AND DOCUSATE SODIUM 1 TABLET: 50; 8.6 TABLET ORAL at 10:02

## 2023-02-20 RX ADMIN — FUROSEMIDE 20 MG: 20 TABLET ORAL at 10:02

## 2023-02-20 RX ADMIN — GUAIFENESIN 600 MG: 600 TABLET, EXTENDED RELEASE ORAL at 10:02

## 2023-02-20 RX ADMIN — FLECAINIDE ACETATE 100 MG: 50 TABLET ORAL at 10:02

## 2023-02-20 RX ADMIN — ESCITALOPRAM OXALATE 10 MG: 10 TABLET ORAL at 10:02

## 2023-02-20 RX ADMIN — SENNOSIDES AND DOCUSATE SODIUM 1 TABLET: 50; 8.6 TABLET ORAL at 09:02

## 2023-02-20 RX ADMIN — MICONAZOLE NITRATE: 20 POWDER TOPICAL at 09:02

## 2023-02-20 NOTE — PT/OT/SLP PROGRESS
Physical Therapy Treatment    Patient Name:  Dania Her   MRN:  [id number]    Recommendations:     Discharge Recommendations: nursing facility, skilled (with transition to nursing home)  Discharge Equipment Recommendations: hospital bed  Barriers to discharge: Inaccessible home, Decreased caregiver support, and current functional status    Assessment:     Dania Her is a 82 y.o. female admitted with a medical diagnosis of Debility.  She presents with the following impairments/functional limitations: weakness, pain, gait instability, impaired balance, impaired cardiopulmonary response to activity, impaired endurance, impaired functional mobility, impaired self care skills, decreased coordination, decreased lower extremity function, decreased upper extremity function.    Supine<>sit with totalA x 2  Static sitting EOB x 15 mins with maxA initially 2/2 posterior lean, then Melita and SBA, intermittently with pt fatigue and with variable UE support  Scoot toward HOB with totalA x 2, in trendelenburg  Pt with moderate tolerance to upright, limited by weakness  Rec SNF with transition to NH    Rehab Prognosis: Fair; patient would benefit from acute skilled PT services to address these deficits and reach maximum level of function.    Recent Surgery: * No surgery found *      Plan:     During this hospitalization, patient to be seen 3 x/week to address the identified rehab impairments via gait training, therapeutic activities, therapeutic exercises and progress toward the following goals:    Plan of Care Expires:  03/15/23    Subjective     Chief Complaint: pain  Patient/Family Comments/goals: dtr called during session to state that pt would go to SNF and then home  Pain/Comfort:  Pain Rating 1: other (see comments) (unrated)  Location - Side 1: Bilateral  Location 1: back (and knees with mobility)  Pain Addressed 1: Reposition, Distraction, Cessation of Activity  Pain Rating Post-Intervention 1: other (see comments)  (unrated)      Objective:     Communicated with nurse Quiroga prior to session.  Patient found HOB elevated with peripheral IV, telemetry, PureWick, oxygen, bed alarm upon PT entry to room.     General Precautions: Standard, fall  Orthopedic Precautions: N/A  Braces: N/A  Respiratory Status: Nasal cannula, flow 1 L/min     Functional Mobility:  Bed Mobility:     Scooting: total assistance, of 2 persons, and toward HOB  Supine to Sit: total assistance and of 2 persons  Sit to Supine: total assistance and of 2 persons      AM-PAC 6 CLICK MOBILITY  Turning over in bed (including adjusting bedclothes, sheets and blankets)?: 2  Sitting down on and standing up from a chair with arms (e.g., wheelchair, bedside commode, etc.): 1  Moving from lying on back to sitting on the side of the bed?: 2  Moving to and from a bed to a chair (including a wheelchair)?: 1  Need to walk in hospital room?: 1  Climbing 3-5 steps with a railing?: 1  Basic Mobility Total Score: 8       Treatment & Education:  Supine therex: AP x 10, QS x 5  Seated therex: LAQ x 10, scapular retraction x 7  Static sitting EOB x 15 mins with maxA initially 2/2 posterior lean, then Melita and SBA, intermittently with pt fatigue and with variable UE support    Patient left HOB elevated with all lines intact, call button in reach, bed alarm on, and VELASQUEZ María present..    GOALS:   Multidisciplinary Problems       Physical Therapy Goals          Problem: Physical Therapy    Goal Priority Disciplines Outcome Goal Variances Interventions   Physical Therapy Goal     PT, PT/OT Ongoing, Progressing     Description: Goals to be met by: 3/15/2023    Patient will increase functional independence with mobility by performin. Transfer supine <> sitting EOB with minimal assist.   2. Be independent with supine home exercise program to maintain current mobility                           Time Tracking:     PT Received On: 23  PT Start Time: 922     PT Stop Time: 956  PT  Total Time (min): 34 min     Billable Minutes: Therapeutic Activity 17 and Therapeutic Exercise 17  Co-treat with OT due to complex nature of patient, to insure patient safety, and to prevent injury to patient and caregivers, requiring intervention of two skilled therapists.      Treatment Type: Treatment  PT/PTA: PTA     PTA Visit Number: 2     02/20/2023

## 2023-02-20 NOTE — PLAN OF CARE
Per Rebeca with [facility], she is awaiting authorization from [facility] for the patient.  JOSE J Pathak currently on the phone with Rebeca. JOSE J Pathak will continue to follow for d/c needs.   02/20/23 1339   Post-Acute Status   Post-Acute Authorization Placement   Post-Acute Placement Status Pending payor review/awaiting authorization (if required)   Discharge Plan   Discharge Plan A

## 2023-02-20 NOTE — ASSESSMENT & PLAN NOTE
Decreased functional mobility and endurance  Chronic chest wall pain  Cancer related pain  Bilateral primary osteoarthritis of knee  Chronic pain of both shoulders  -placed in observation 2/11 due to exacerbations of her chronic chest, back, and knee pain brought on by emotional distress at not being well cared for at home  -reports ongoing chronic pain that improves with PRN supportive care  -Palliative Care consulted, appreciate assistance  -PT/OT following  -PRN supportive care   -dose/medication adjustment as appropriate   -fall precautions  -monitor   -CM/SW following with plans for [facility] nh

## 2023-02-20 NOTE — PLAN OF CARE
Problem: Adult Inpatient Plan of Care  Goal: Plan of Care Review  Outcome: Ongoing, Progressing  Goal: Optimal Comfort and Wellbeing  Outcome: Ongoing, Progressing  Goal: Readiness for Transition of Care  Outcome: Ongoing, Progressing     Problem: Dysrhythmia (Acute Coronary Syndrome)  Goal: Normalized Cardiac Rhythm  Outcome: Ongoing, Progressing     Problem: Skin Injury Risk Increased  Goal: Skin Health and Integrity  Outcome: Ongoing, Progressing     Problem: Diabetes Comorbidity  Goal: Blood Glucose Level Within Targeted Range  Outcome: Ongoing, Progressing     Problem: Impaired Wound Healing  Goal: Optimal Wound Healing  Outcome: Ongoing, Progressing     Problem: Bariatric Environmental Safety  Goal: Safety Maintained with Care  Outcome: Ongoing, Progressing     Problem: Infection  Goal: Absence of Infection Signs and Symptoms  Outcome: Ongoing, Progressing     Problem: Fall Injury Risk  Goal: Absence of Fall and Fall-Related Injury  Outcome: Ongoing, Progressing

## 2023-02-20 NOTE — PROGRESS NOTES
"[facility] - [address] Medicine  Progress Note    Patient Name: Dania Her  MRN: [id number]  Patient Class: IP- Inpatient   Admission Date: 2/11/2023  Length of Stay: 5 days  Attending Physician: Enoch Sim MD  Primary Care Provider: Primary Doctor No        Subjective:     Principal Problem:Debility        HPI:  Per admitting provider:  "The patient is a 83 yo female with a past medical history of atrial fibrillation, HTN, IDDM, and pulmonary hypertension who presents for evaluation of increasing frequency of her standing intermittent chest pain over the past few to many days. She states the chest pain sometimes occurs with exertion and other times has no inciting factors. She endorses associated dyspnea with these episodes and reports worsening leg swelling. She also notes productive cough with clear mucus "when I have hot soup or pepper." She states she has had episodes of diaphoresis "and I get hot hot hot." She attributes this to her breast cancer related medication, denies any acute change, or relation to her episodes of chest pain.  Denies recent fever, chills, pain elsewhere, and other somatic complaints.  The patient has a mildly elevated troponin to .028, d-dimer 1.42.  She will be admitted for further management of her elevated troponin and Cardiology consult."    History clarified with the patient, she reports she is been slowly becoming more debilitated and for the last 2 months she is been unable to walk.  Therefore, she turned the living room into her bedroom as she is unable to go up the stairs.  She lives at home with her 83-year-old  who also has multiple medical issues.  He is unable to provide proper care for her and her children her only able to stop by every now and then.  She has a sitter who comes only 3 days per week.  The rest of the time, she has no one to change her or turn her.  She said it has become increasingly clear to her that she may need to " move into a nursing facility.  The reason she came to the hospital yesterday because her daughter decided to have a birthday party in the living room which is her bedroom.  She was upset about all the people invading her personal space and because she got upset all of her chronic aches and pains were exacerbated.  The chest pain she reported is the same pain she has had on and off since her mastectomy, she also reported back pain and knee pain.  She denied any associated nausea, jaw pain, arm pain, diaphoresis, or shortness of breath.      Overview/Hospital Course:  Ms. Her was placed in observation 2/11 due to exacerbations of her chronic chest, back, and knee pain brought on by emotional distress at not being well cared for at home. Troponin trend 0.028 >> 0.046 >> 0.028; EKG with NSR and TWA/inferolateral changes that are seen on prior studies per Care Everywhere. D-dimer 1.42 with negative CTA chest. Intermittently with nonsustained SVT and also pauses, caridology consulted: diltiazem discontinued, discussed with patient's cardiologist outpatient, PPM placement to be considered in the future.Palliative Care consulted, appreciate assistance. PT/OT following and recommending SNF.     Disposition plans: CM/SW following with plans for SNF and potential skilled nursing NH conversion if appropriate long term.  Cardiology and PCP follow up advised at [facility]; follows with Cardiology at [facility].       Interval History: awaiting placement, no complaints this am    Review of Systems   Constitutional:  Positive for activity change and fatigue.   Cardiovascular:  Negative for chest pain.   Gastrointestinal:  Positive for constipation (chronic). Negative for abdominal pain.   Musculoskeletal:  Positive for arthralgias and gait problem.   Neurological:  Positive for weakness. Negative for headaches.   Objective:     Vital Signs (Most Recent):  Temp: 97.6 °F (36.4 °C) (02/20/23 1609)  Pulse: 74 (02/20/23 1609)  Resp: 18 (02/20/23  1609)  BP: 114/61 (02/20/23 1609)  SpO2: 98 % (02/20/23 1609) Vital Signs (24h Range):  Temp:  [97.6 °F (36.4 °C)-98.8 °F (37.1 °C)] 97.6 °F (36.4 °C)  Pulse:  [51-78] 74  Resp:  [16-20] 18  SpO2:  [92 %-99 %] 98 %  BP: (114-134)/(61-82) 114/61     Weight: 129.1 kg (284 lb 9.8 oz)  Body mass index is 47.36 kg/m².    Intake/Output Summary (Last 24 hours) at 2/20/2023 1646  Last data filed at 2/20/2023 1000  Gross per 24 hour   Intake 840 ml   Output 1200 ml   Net -360 ml      Physical Exam  Vitals and nursing note reviewed.   Constitutional:       General: She is not in acute distress.     Appearance: Normal appearance. She is not ill-appearing.   Cardiovascular:      Rate and Rhythm: Normal rate and regular rhythm.   Pulmonary:      Effort: Pulmonary effort is normal. No respiratory distress.   Abdominal:      General: Abdomen is flat.      Palpations: Abdomen is soft.   Musculoskeletal:      Right lower leg: No edema.      Left lower leg: No edema.   Skin:     General: Skin is warm and dry.   Neurological:      General: No focal deficit present.      Mental Status: She is alert.   Psychiatric:         Mood and Affect: Mood normal.       Significant Labs: All pertinent labs within the past 24 hours have been reviewed.    Significant Imaging: I have reviewed all pertinent imaging results/findings within the past 24 hours.      Assessment/Plan:      * Debility  Decreased functional mobility and endurance  Chronic chest wall pain  Cancer related pain  Bilateral primary osteoarthritis of knee  Chronic pain of both shoulders  -placed in observation 2/11 due to exacerbations of her chronic chest, back, and knee pain brought on by emotional distress at not being well cared for at home  -reports ongoing chronic pain that improves with PRN supportive care  -Palliative Care consulted, appreciate assistance  -PT/OT following  -PRN supportive care   -dose/medication adjustment as appropriate   -fall precautions  -monitor    -CM/SW following with plans for alf nh    Tachy-angeline syndrome   Multiple sinus pauses at least 4 seconds, as well as nonsustained SVT  -diltiazem stopped  -will likely need pacemaker.  Discussed with Dr. Block (EP at [facility])     Pyuria  UCx showing ESBL E. Coli  Patient is afebrile, asymptomatic  Will not treat at this time      Breast cancer  -chronic, s/p mastectomy with chronic chest wall pain  -continue home anastrozole    Atrial fibrillation  History of SVT  History of DVT of lower extremity  Long term current use of anticoagulants  -chronic, controlled mostly  -remains HDS (bouts of brief, self limiting AFib/SVT on tele (chronic in nature per Care Everywhere)  -continue home diltiazem, flecainide, and apixaban  -dose/medication adjustment as appropriate   -continue tele monitoring  -EKG PRN concerns   -Cardiology and PCP follow up advised at [facility]; follows with Cardiology at [facility]    Type 2 diabetes mellitus, with long-term current use of insulin  -chronic, not well controlled at home  -HgA1C 8.1  -hold home glyburide  -LDSSI initiated  -dose/medication adjustment as appropriate   -monitor accuchecks AC/HS and PRN hypoglycemic protocol     Primary hypertension  -chronic, controlled  -continue home diltiazem, flecainide, furosemide, HCTZ, and lisinopril   -dose/medication adjustment as appropriate   -monitor     Elevated troponin  -placed in observation 2/11 due to exacerbations of her chronic chest, back, and knee pain brought on by emotional distress at not being well cared for at home  -unlikely ACS  -troponin trend 0.028 >> 0.046 >> 0.028  -EKG with NSR and TWA/inferolateral changes that are seen on prior studies per Care Everywhere  -D-dimer 1.42 with negative CTA chest  -remains HDS (bouts of brief, self limiting AFib/SVT on tele (chronic in nature per Care Everywhere)  -continue home diltiazem, flecainide, furosemide, HCTZ, and lisinopril   -dose/medication adjustment as appropriate   -EKG PRN  concerns  -monitor   -Cardiology and PCP follow up advised at [facility]; follows with Cardiology at [facility]      VTE Risk Mitigation (From admission, onward)         Ordered     apixaban tablet 5 mg  2 times daily         02/12/23 1207     IP VTE HIGH RISK PATIENT  Once         02/12/23 0256     Place sequential compression device  Until discontinued         02/12/23 0256                Discharge Planning   VALENCIA: 2/20/2023     Code Status: Full Code   Is the patient medically ready for discharge?:     Reason for patient still in hospital (select all that apply): Patient trending condition  Discharge Plan A: New Nursing Home placement - [facility] care facility                  Franca Reyes PA-C  Department of Hospital Medicine   [facility] - Med Surg ([address])

## 2023-02-20 NOTE — PLAN OF CARE
JOSE J  spoke Daniela Sin RN at [facility] and she sent the denial letter to JOSE J.      JOSE J spoke with the patient's daughter earlier, who said she was having a problem getting the patient's paper done because

## 2023-02-20 NOTE — PT/OT/SLP PROGRESS
"Occupational Therapy   Treatment    Name: Dania Her  MRN: [id number]  Admitting Diagnosis:  Debility       Recommendations:     Discharge Recommendations: nursing facility, skilled  Discharge Equipment Recommendations:  hospital bed  Barriers to discharge:  Decreased caregiver support    Assessment:     Dania Her is a 82 y.o. female with a medical diagnosis of Debility.  She presents with alert, orient, and pleasant. Performance deficits affecting function are weakness, pain, impaired endurance, impaired self care skills, impaired functional mobility, gait instability, impaired balance, decreased upper extremity function, decreased lower extremity function, decreased ROM, impaired cardiopulmonary response to activity.     Rehab Prognosis:  Fair; patient would benefit from acute skilled OT services to address these deficits and reach maximum level of function.       Sit <> Supine: Total A x 2   Scooting EOB: Max x 2  Scooting HOB: Total x 2 with bed in trendelenburg position   Sitting EOB: approx 15 Min, initially with Max A then fluctuating between Min A to SBA due to R lateral leaning   Grooming  - Oral Care: Set up with SBA while seated EOB  - Washing Face: Max A and tactile cue due to weakness and fatigue while seated on EOB  Upper body dressing: Max A while seated EOB due weakness and fatigue  Lower Body Dressing: Dependent to don both socks in supine due to pt's current functional level     Plan:     Patient to be seen 4 x/week to address the above listed problems via self-care/home management  Plan of Care Expires: 02/26/23  Plan of Care Reviewed with: patient    Subjective     Pain/Comfort:  Pain Rating 1:  (unrated but notice of wincing and grimcing during tx session)  Pain Addressed 1: Reposition, Distraction, Cessation of Activity  Patient said "I am feeling a little dizzy"  Objective:     Communicated with: RN  prior to session.  Patient found supine with peripheral IV, telemetry, Nicolas, " oxygen, bed alarm upon OT entry to room.    General Precautions: Standard, fall    Orthopedic Precautions:N/A  Braces: N/A  Respiratory Status: Nasal cannula, flow 1 L/min     Occupational Performance:     Bed Mobility:    Supine <> Sit: Total A x 2  Right Rolling: Total A   Scooting EOB: Total A x 2   Scooting HOB: Total x 2 with bed in trendelenburg position   Sitting EOB for approx 15 min initially with Max A then fluctuating between Min A to SBA due to R lateral leaning     Activities of Daily Living:  Grooming:   Oral Care: Set up with SBA  Washing face: Max A and tactile cue   Upper Body Dressing: Max A donning gown while seated EOB  Lower Body Dressing: Dependent to both don socks in supine       [facility] 6 Click ADL: 11    Treatment & Education:  OT role, plan of care, progression of goals, importance of continued OOB activity, ADL/functional transfer and mobility retraining, discharge recommendation, call don't fall, safety precautions, fall prevention.     Patient left supine with all lines intact and call button in reach and RN present.     GOALS:   Multidisciplinary Problems       Occupational Therapy Goals          Problem: Occupational Therapy    Goal Priority Disciplines Outcome Interventions   Occupational Therapy Goal     OT, PT/OT Ongoing, Progressing    Description: Goals to be met by: 2/26/2023     Patient will increase functional independence with ADLs by performing:    Grooming while EOB with Supervision and Set Up Assistance.  Sitting at edge of bed x15 minutes with Supervision.  Rolling to Bilateral with Moderate Assistance.   Supine to sit with Maximum Assistance x1.                         Time Tracking:     OT Date of Treatment: 02/20/23  OT Start Time: 0923  OT Stop Time: 1000  OT Total Time (min): 37 min    Billable Minutes:Self Care/Home Management 37min    OT/BRYANT: BRYANT     BRYANT Visit Number: 1    Co treament performed due to patient's multiple deficits requiring two skilled therapists to  safely assess patient's ability to complete ADLs and functional mobility in addition to accommodating for patient's activity tolerance while in acute care setting.    2/20/2023

## 2023-02-20 NOTE — PLAN OF CARE
This CM Director spoke with Rebeca with [facility] ([phone]). Per Rebeca, she has been speaking with the patient's daughter all day and has sent the email for the admissions packet over four times today.  This director spoke with the patient's daughter. Per the daughter, she spoke with Rebeca this morning. I told the daughter that Rebeca is waiting on her phone call to get her to the next step for the password required. Daughter verbalized understanding. JOSE J Pathak to receive an update and charge nurse Carol.

## 2023-02-20 NOTE — SUBJECTIVE & OBJECTIVE
Interval History: awaiting placement, no complaints this am    Review of Systems   Constitutional:  Positive for activity change and fatigue.   Cardiovascular:  Negative for chest pain.   Gastrointestinal:  Positive for constipation (chronic). Negative for abdominal pain.   Musculoskeletal:  Positive for arthralgias and gait problem.   Neurological:  Positive for weakness. Negative for headaches.   Objective:     Vital Signs (Most Recent):  Temp: 97.6 °F (36.4 °C) (02/20/23 1609)  Pulse: 74 (02/20/23 1609)  Resp: 18 (02/20/23 1609)  BP: 114/61 (02/20/23 1609)  SpO2: 98 % (02/20/23 1609) Vital Signs (24h Range):  Temp:  [97.6 °F (36.4 °C)-98.8 °F (37.1 °C)] 97.6 °F (36.4 °C)  Pulse:  [51-78] 74  Resp:  [16-20] 18  SpO2:  [92 %-99 %] 98 %  BP: (114-134)/(61-82) 114/61     Weight: 129.1 kg (284 lb 9.8 oz)  Body mass index is 47.36 kg/m².    Intake/Output Summary (Last 24 hours) at 2/20/2023 1646  Last data filed at 2/20/2023 1000  Gross per 24 hour   Intake 840 ml   Output 1200 ml   Net -360 ml      Physical Exam  Vitals and nursing note reviewed.   Constitutional:       General: She is not in acute distress.     Appearance: Normal appearance. She is not ill-appearing.   Cardiovascular:      Rate and Rhythm: Normal rate and regular rhythm.   Pulmonary:      Effort: Pulmonary effort is normal. No respiratory distress.   Abdominal:      General: Abdomen is flat.      Palpations: Abdomen is soft.   Musculoskeletal:      Right lower leg: No edema.      Left lower leg: No edema.   Skin:     General: Skin is warm and dry.   Neurological:      General: No focal deficit present.      Mental Status: She is alert.   Psychiatric:         Mood and Affect: Mood normal.       Significant Labs: All pertinent labs within the past 24 hours have been reviewed.    Significant Imaging: I have reviewed all pertinent imaging results/findings within the past 24 hours.

## 2023-02-20 NOTE — PLAN OF CARE
JOSE J called the patient's daughter, Nadeen regarding her d/c. JOSE J informed her that the patient has a discharge order on the chart, and will be d/c'd to [place] or d/c'd home.  JOSE J stressed that if she is to go to [place] she needs to call Rebeca Neri to do the paperwork. JOSE J confirmed for her again that the patient was accepted there. She has been made aware of this. She said she will call her sister and get back to .  JOSE J stressed that she needs to get back to  about their decision this AM. .

## 2023-02-21 LAB
ERYTHROCYTE [DISTWIDTH] IN BLOOD BY AUTOMATED COUNT: 14.4 % (ref 11.5–14.5)
HCT VFR BLD AUTO: 37.5 % (ref 37–48.5)
HGB BLD-MCNC: 11.8 G/DL (ref 12–16)
MCH RBC QN AUTO: 31.7 PG (ref 27–31)
MCHC RBC AUTO-ENTMCNC: 31.5 G/DL (ref 32–36)
MCV RBC AUTO: 101 FL (ref 82–98)
PLATELET # BLD AUTO: 125 K/UL (ref 150–450)
PMV BLD AUTO: 10.7 FL (ref 9.2–12.9)
POCT GLUCOSE: 166 MG/DL (ref 70–110)
POCT GLUCOSE: 166 MG/DL (ref 70–110)
POCT GLUCOSE: 183 MG/DL (ref 70–110)
POCT GLUCOSE: 219 MG/DL (ref 70–110)
RBC # BLD AUTO: 3.72 M/UL (ref 4–5.4)
TB INDURATION 48 - 72 HR READ: 0 MM
WBC # BLD AUTO: 5.88 K/UL (ref 3.9–12.7)

## 2023-02-21 PROCEDURE — 25000003 PHARM REV CODE 250: Performed by: NURSE PRACTITIONER

## 2023-02-21 PROCEDURE — 27000221 HC OXYGEN, UP TO 24 HOURS

## 2023-02-21 PROCEDURE — 99900035 HC TECH TIME PER 15 MIN (STAT)

## 2023-02-21 PROCEDURE — 21400001 HC TELEMETRY ROOM

## 2023-02-21 PROCEDURE — 25000003 PHARM REV CODE 250: Performed by: PHYSICIAN ASSISTANT

## 2023-02-21 PROCEDURE — 85027 COMPLETE CBC AUTOMATED: CPT | Performed by: INTERNAL MEDICINE

## 2023-02-21 PROCEDURE — 99232 PR SUBSEQUENT HOSPITAL CARE,LEVL II: ICD-10-PCS | Mod: ,,, | Performed by: PHYSICIAN ASSISTANT

## 2023-02-21 PROCEDURE — 94761 N-INVAS EAR/PLS OXIMETRY MLT: CPT

## 2023-02-21 PROCEDURE — 99232 SBSQ HOSP IP/OBS MODERATE 35: CPT | Mod: ,,, | Performed by: PHYSICIAN ASSISTANT

## 2023-02-21 PROCEDURE — 36415 COLL VENOUS BLD VENIPUNCTURE: CPT | Performed by: INTERNAL MEDICINE

## 2023-02-21 RX ADMIN — POLYETHYLENE GLYCOL 3350 17 G: 17 POWDER, FOR SOLUTION ORAL at 09:02

## 2023-02-21 RX ADMIN — ESCITALOPRAM OXALATE 10 MG: 10 TABLET ORAL at 09:02

## 2023-02-21 RX ADMIN — TRAZODONE HYDROCHLORIDE 50 MG: 50 TABLET ORAL at 09:02

## 2023-02-21 RX ADMIN — APIXABAN 5 MG: 2.5 TABLET, FILM COATED ORAL at 09:02

## 2023-02-21 RX ADMIN — HYDROCODONE BITARTRATE AND ACETAMINOPHEN 1 TABLET: 5; 325 TABLET ORAL at 12:02

## 2023-02-21 RX ADMIN — GUAIFENESIN 600 MG: 600 TABLET, EXTENDED RELEASE ORAL at 09:02

## 2023-02-21 RX ADMIN — ANASTROZOLE 1 MG: 1 TABLET, COATED ORAL at 09:02

## 2023-02-21 RX ADMIN — FLECAINIDE ACETATE 100 MG: 50 TABLET ORAL at 09:02

## 2023-02-21 RX ADMIN — MICONAZOLE NITRATE: 20 POWDER TOPICAL at 09:02

## 2023-02-21 RX ADMIN — FUROSEMIDE 20 MG: 20 TABLET ORAL at 09:02

## 2023-02-21 RX ADMIN — TRAMADOL HYDROCHLORIDE 50 MG: 50 TABLET, COATED ORAL at 09:02

## 2023-02-21 RX ADMIN — INSULIN ASPART 2 UNITS: 100 INJECTION, SOLUTION INTRAVENOUS; SUBCUTANEOUS at 12:02

## 2023-02-21 RX ADMIN — SENNOSIDES AND DOCUSATE SODIUM 1 TABLET: 50; 8.6 TABLET ORAL at 09:02

## 2023-02-21 RX ADMIN — ATORVASTATIN CALCIUM 80 MG: 20 TABLET, FILM COATED ORAL at 09:02

## 2023-02-21 RX ADMIN — DICLOFENAC 4 G: 10 GEL TOPICAL at 09:02

## 2023-02-21 RX ADMIN — LISINOPRIL 2.5 MG: 2.5 TABLET ORAL at 09:02

## 2023-02-21 NOTE — NURSING
Iodine applied to blackened areas of both feet as ordered. Pt encouraged to change positions - able to reposition self with encouragement. Son at bedside.

## 2023-02-21 NOTE — PROGRESS NOTES
"[facility] - [address] Medicine  Progress Note    Patient Name: Dania Her  MRN: [id number]  Patient Class: IP- Inpatient   Admission Date: 2/11/2023  Length of Stay: 6 days  Attending Physician: Ashanti Almanza MD  Primary Care Provider: Primary Doctor No        Subjective:     Principal Problem:Debility        HPI:  Per admitting provider:  "The patient is a 83 yo female with a past medical history of atrial fibrillation, HTN, IDDM, and pulmonary hypertension who presents for evaluation of increasing frequency of her standing intermittent chest pain over the past few to many days. She states the chest pain sometimes occurs with exertion and other times has no inciting factors. She endorses associated dyspnea with these episodes and reports worsening leg swelling. She also notes productive cough with clear mucus "when I have hot soup or pepper." She states she has had episodes of diaphoresis "and I get hot hot hot." She attributes this to her breast cancer related medication, denies any acute change, or relation to her episodes of chest pain.  Denies recent fever, chills, pain elsewhere, and other somatic complaints.  The patient has a mildly elevated troponin to .028, d-dimer 1.42.  She will be admitted for further management of her elevated troponin and Cardiology consult."    History clarified with the patient, she reports she is been slowly becoming more debilitated and for the last 2 months she is been unable to walk.  Therefore, she turned the living room into her bedroom as she is unable to go up the stairs.  She lives at home with her 83-year-old  who also has multiple medical issues.  He is unable to provide proper care for her and her children her only able to stop by every now and then.  She has a sitter who comes only 3 days per week.  The rest of the time, she has no one to change her or turn her.  She said it has become increasingly clear to her that she may need to move " into a nursing facility.  The reason she came to the hospital yesterday because her daughter decided to have a birthday party in the living room which is her bedroom.  She was upset about all the people invading her personal space and because she got upset all of her chronic aches and pains were exacerbated.  The chest pain she reported is the same pain she has had on and off since her mastectomy, she also reported back pain and knee pain.  She denied any associated nausea, jaw pain, arm pain, diaphoresis, or shortness of breath.      Overview/Hospital Course:  Ms. Her was placed in observation 2/11 due to exacerbations of her chronic chest, back, and knee pain brought on by emotional distress at not being well cared for at home. Troponin trend 0.028 >> 0.046 >> 0.028; EKG with NSR and TWA/inferolateral changes that are seen on prior studies per Care Everywhere. D-dimer 1.42 with negative CTA chest. Intermittently with nonsustained SVT and also pauses, caridology consulted: diltiazem discontinued, discussed with patient's cardiologist outpatient, PPM placement to be considered in the future.Palliative Care consulted, appreciate assistance. PT/OT following and recommending SNF.     Disposition plans: CM/SW following with plans for penitentiary placement since SNF was denied by insurance.  Cardiology and PCP follow up advised at [place]; follows with [facility]iology at [facility].       Interval History: No acute events overnight, seen at bedside eating breakfast. No complaints. Hopefully penitentiary placement tomorrow    Review of Systems   Constitutional:  Positive for activity change and fatigue.   Cardiovascular:  Negative for chest pain.   Gastrointestinal:  Positive for constipation (chronic). Negative for abdominal pain.   Musculoskeletal:  Positive for arthralgias and gait problem.   Neurological:  Positive for weakness. Negative for headaches.   Objective:     Vital Signs (Most Recent):  Temp: 97.9 °F (36.6 °C) (02/21/23  0501)  Pulse: (!) 53 (02/21/23 0800)  Resp: 16 (02/21/23 0930)  BP: (!) 112/59 (02/21/23 0501)  SpO2: 95 % (02/21/23 0501)   Vital Signs (24h Range):  Temp:  [97.6 °F (36.4 °C)-98.6 °F (37 °C)] 97.9 °F (36.6 °C)  Pulse:  [53-82] 53  Resp:  [16-18] 16  SpO2:  [92 %-98 %] 95 %  BP: (109-134)/(53-75) 112/59     Weight: 129.1 kg (284 lb 9.8 oz)  Body mass index is 47.36 kg/m².    Intake/Output Summary (Last 24 hours) at 2/21/2023 1003  Last data filed at 2/21/2023 0716  Gross per 24 hour   Intake 1190 ml   Output 2600 ml   Net -1410 ml      Physical Exam  Vitals and nursing note reviewed.   Constitutional:       General: She is not in acute distress.     Appearance: Normal appearance. She is not ill-appearing.   Cardiovascular:      Rate and Rhythm: Normal rate and regular rhythm.   Pulmonary:      Effort: Pulmonary effort is normal. No respiratory distress.   Abdominal:      General: Abdomen is flat.      Palpations: Abdomen is soft.   Musculoskeletal:      Right lower leg: No edema.      Left lower leg: No edema.   Skin:     General: Skin is warm and dry.   Neurological:      General: No focal deficit present.      Mental Status: She is alert.   Psychiatric:         Mood and Affect: Mood normal.       Significant Labs: All pertinent labs within the past 24 hours have been reviewed.    Significant Imaging: I have reviewed all pertinent imaging results/findings within the past 24 hours.      Assessment/Plan:      * Debility  Decreased functional mobility and endurance  Chronic chest wall pain  Cancer related pain  Bilateral primary osteoarthritis of knee  Chronic pain of both shoulders  -placed in observation 2/11 due to exacerbations of her chronic chest, back, and knee pain brought on by emotional distress at not being well cared for at home  -reports ongoing chronic pain that improves with PRN supportive care  -Palliative Care consulted, appreciate assistance  -PT/OT following  -PRN supportive care   -dose/medication  adjustment as appropriate   -fall precautions  -monitor   -CM/SW following with plans for detention [facility]    Tachy-angeline syndrome   Multiple sinus pauses at least 4 seconds, as well as nonsustained SVT  -diltiazem stopped  -will likely need pacemaker.  Discussed with Dr. Block (EP at [facility])     Pyuria  UCx showing ESBL E. Coli  Patient is afebrile, asymptomatic  Will not treat at this time      Breast cancer  -chronic, s/p mastectomy with chronic chest wall pain  -continue home anastrozole    Atrial fibrillation  History of SVT  History of DVT of lower extremity  Long term current use of anticoagulants  -chronic, controlled mostly  -remains HDS (bouts of brief, self limiting AFib/SVT on tele (chronic in nature per Care Everywhere)  -continue home diltiazem, flecainide, and apixaban  -dose/medication adjustment as appropriate   -continue tele monitoring  -EKG PRN concerns   -Cardiology and PCP follow up advised at NH; follows with Cardiology at [facility]    Type 2 diabetes mellitus, with long-term current use of insulin  -chronic, not well controlled at home  -HgA1C 8.1  -hold home glyburide  -LDSSI initiated  -dose/medication adjustment as appropriate   -monitor accuchecks AC/HS and PRN hypoglycemic protocol     Primary hypertension  -chronic, controlled  -continue home diltiazem, flecainide, furosemide, HCTZ, and lisinopril   -dose/medication adjustment as appropriate   -monitor     Elevated troponin  -placed in observation 2/11 due to exacerbations of her chronic chest, back, and knee pain brought on by emotional distress at not being well cared for at home  -unlikely ACS  -troponin trend 0.028 >> 0.046 >> 0.028  -EKG with NSR and TWA/inferolateral changes that are seen on prior studies per Care Everywhere  -D-dimer 1.42 with negative CTA chest  -remains HDS (bouts of brief, self limiting AFib/SVT on tele (chronic in nature per Care Everywhere)  -continue home diltiazem, flecainide, furosemide, HCTZ, and lisinopril    -dose/medication adjustment as appropriate   -EKG PRN concerns  -monitor   -Cardiology and PCP follow up advised at [place]; follows with Cardiology at [facility]      VTE Risk Mitigation (From admission, onward)         Ordered     apixaban tablet 5 mg  2 times daily         02/12/23 1207     IP VTE HIGH RISK PATIENT  Once         02/12/23 0256     Place sequential compression device  Until discontinued         02/12/23 0256                Discharge Planning   VAL[place]: 2/20/2023     Code Status: Full Code   Is the patient medically ready for discharge?:     Reason for patient still in hospital (select all that apply): Patient trending condition  Discharge Plan A: New Nursing Home placement - correction care facility                  Franca Reyes PA-C  Department of Hospital Medicine   [facility] - Med Surg ([address])

## 2023-02-21 NOTE — SUBJECTIVE & OBJECTIVE
Interval History: No acute events overnight, seen at bedside eating breakfast. No complaints. Hopefully retirement placement tomorrow    Review of Systems   Constitutional:  Positive for activity change and fatigue.   Cardiovascular:  Negative for chest pain.   Gastrointestinal:  Positive for constipation (chronic). Negative for abdominal pain.   Musculoskeletal:  Positive for arthralgias and gait problem.   Neurological:  Positive for weakness. Negative for headaches.   Objective:     Vital Signs (Most Recent):  Temp: 97.9 °F (36.6 °C) (02/21/23 0501)  Pulse: (!) 53 (02/21/23 0800)  Resp: 16 (02/21/23 0930)  BP: (!) 112/59 (02/21/23 0501)  SpO2: 95 % (02/21/23 0501)   Vital Signs (24h Range):  Temp:  [97.6 °F (36.4 °C)-98.6 °F (37 °C)] 97.9 °F (36.6 °C)  Pulse:  [53-82] 53  Resp:  [16-18] 16  SpO2:  [92 %-98 %] 95 %  BP: (109-134)/(53-75) 112/59     Weight: 129.1 kg (284 lb 9.8 oz)  Body mass index is 47.36 kg/m².    Intake/Output Summary (Last 24 hours) at 2/21/2023 1003  Last data filed at 2/21/2023 0716  Gross per 24 hour   Intake 1190 ml   Output 2600 ml   Net -1410 ml      Physical Exam  Vitals and nursing note reviewed.   Constitutional:       General: She is not in acute distress.     Appearance: Normal appearance. She is not ill-appearing.   Cardiovascular:      Rate and Rhythm: Normal rate and regular rhythm.   Pulmonary:      Effort: Pulmonary effort is normal. No respiratory distress.   Abdominal:      General: Abdomen is flat.      Palpations: Abdomen is soft.   Musculoskeletal:      Right lower leg: No edema.      Left lower leg: No edema.   Skin:     General: Skin is warm and dry.   Neurological:      General: No focal deficit present.      Mental Status: She is alert.   Psychiatric:         Mood and Affect: Mood normal.       Significant Labs: All pertinent labs within the past 24 hours have been reviewed.    Significant Imaging: I have reviewed all pertinent imaging results/findings within the past  24 hours.

## 2023-02-22 PROBLEM — E83.52 HYPERCALCEMIA: Status: ACTIVE | Noted: 2023-02-22

## 2023-02-22 LAB
ANION GAP SERPL CALC-SCNC: 5 MMOL/L (ref 8–16)
BUN SERPL-MCNC: 12 MG/DL (ref 8–23)
CA-I BLDV-SCNC: 1.4 MMOL/L (ref 1.06–1.42)
CALCIUM SERPL-MCNC: 11.1 MG/DL (ref 8.7–10.5)
CHLORIDE SERPL-SCNC: 95 MMOL/L (ref 95–110)
CO2 SERPL-SCNC: 35 MMOL/L (ref 23–29)
CREAT SERPL-MCNC: 0.7 MG/DL (ref 0.5–1.4)
EST. GFR  (NO RACE VARIABLE): >60 ML/MIN/1.73 M^2
GLUCOSE SERPL-MCNC: 194 MG/DL (ref 70–110)
POCT GLUCOSE: 165 MG/DL (ref 70–110)
POCT GLUCOSE: 168 MG/DL (ref 70–110)
POCT GLUCOSE: 200 MG/DL (ref 70–110)
POCT GLUCOSE: 203 MG/DL (ref 70–110)
POTASSIUM SERPL-SCNC: 4.1 MMOL/L (ref 3.5–5.1)
PTH-INTACT SERPL-MCNC: 96.1 PG/ML (ref 9–77)
SODIUM SERPL-SCNC: 135 MMOL/L (ref 136–145)

## 2023-02-22 PROCEDURE — 97535 SELF CARE MNGMENT TRAINING: CPT | Mod: CO

## 2023-02-22 PROCEDURE — 36415 COLL VENOUS BLD VENIPUNCTURE: CPT | Performed by: PHYSICIAN ASSISTANT

## 2023-02-22 PROCEDURE — 97530 THERAPEUTIC ACTIVITIES: CPT | Mod: CQ

## 2023-02-22 PROCEDURE — 80048 BASIC METABOLIC PNL TOTAL CA: CPT | Performed by: PHYSICIAN ASSISTANT

## 2023-02-22 PROCEDURE — 99900035 HC TECH TIME PER 15 MIN (STAT)

## 2023-02-22 PROCEDURE — 82330 ASSAY OF CALCIUM: CPT | Performed by: PHYSICIAN ASSISTANT

## 2023-02-22 PROCEDURE — 25000003 PHARM REV CODE 250: Performed by: NURSE PRACTITIONER

## 2023-02-22 PROCEDURE — 83970 ASSAY OF PARATHORMONE: CPT | Performed by: PHYSICIAN ASSISTANT

## 2023-02-22 PROCEDURE — 25000003 PHARM REV CODE 250: Performed by: PHYSICIAN ASSISTANT

## 2023-02-22 PROCEDURE — 99233 SBSQ HOSP IP/OBS HIGH 50: CPT | Mod: ,,, | Performed by: PHYSICIAN ASSISTANT

## 2023-02-22 PROCEDURE — 99233 PR SUBSEQUENT HOSPITAL CARE,LEVL III: ICD-10-PCS | Mod: ,,, | Performed by: PHYSICIAN ASSISTANT

## 2023-02-22 PROCEDURE — 11000001 HC ACUTE MED/SURG PRIVATE ROOM

## 2023-02-22 PROCEDURE — 94761 N-INVAS EAR/PLS OXIMETRY MLT: CPT

## 2023-02-22 PROCEDURE — 27000221 HC OXYGEN, UP TO 24 HOURS

## 2023-02-22 RX ADMIN — SODIUM CHLORIDE 500 ML: 0.9 INJECTION, SOLUTION INTRAVENOUS at 11:02

## 2023-02-22 RX ADMIN — ESCITALOPRAM OXALATE 10 MG: 10 TABLET ORAL at 09:02

## 2023-02-22 RX ADMIN — TRAMADOL HYDROCHLORIDE 50 MG: 50 TABLET, COATED ORAL at 09:02

## 2023-02-22 RX ADMIN — FLECAINIDE ACETATE 100 MG: 50 TABLET ORAL at 09:02

## 2023-02-22 RX ADMIN — APIXABAN 5 MG: 2.5 TABLET, FILM COATED ORAL at 09:02

## 2023-02-22 RX ADMIN — POLYETHYLENE GLYCOL 3350 17 G: 17 POWDER, FOR SOLUTION ORAL at 09:02

## 2023-02-22 RX ADMIN — LISINOPRIL 2.5 MG: 2.5 TABLET ORAL at 09:02

## 2023-02-22 RX ADMIN — TRAZODONE HYDROCHLORIDE 50 MG: 50 TABLET ORAL at 09:02

## 2023-02-22 RX ADMIN — INSULIN ASPART 1 UNITS: 100 INJECTION, SOLUTION INTRAVENOUS; SUBCUTANEOUS at 09:02

## 2023-02-22 RX ADMIN — GUAIFENESIN 600 MG: 600 TABLET, EXTENDED RELEASE ORAL at 09:02

## 2023-02-22 RX ADMIN — MICONAZOLE NITRATE: 20 POWDER TOPICAL at 09:02

## 2023-02-22 RX ADMIN — HYDROCODONE BITARTRATE AND ACETAMINOPHEN 1 TABLET: 5; 325 TABLET ORAL at 03:02

## 2023-02-22 RX ADMIN — ANASTROZOLE 1 MG: 1 TABLET, COATED ORAL at 09:02

## 2023-02-22 RX ADMIN — TRAMADOL HYDROCHLORIDE 50 MG: 50 TABLET, COATED ORAL at 10:02

## 2023-02-22 RX ADMIN — FUROSEMIDE 20 MG: 20 TABLET ORAL at 09:02

## 2023-02-22 RX ADMIN — DICLOFENAC 4 G: 10 GEL TOPICAL at 09:02

## 2023-02-22 RX ADMIN — ATORVASTATIN CALCIUM 80 MG: 20 TABLET, FILM COATED ORAL at 09:02

## 2023-02-22 RX ADMIN — SENNOSIDES AND DOCUSATE SODIUM 1 TABLET: 50; 8.6 TABLET ORAL at 09:02

## 2023-02-22 NOTE — PT/OT/SLP PROGRESS
Physical Therapy Treatment    Patient Name:  Dania Her   MRN:  [id number]    Recommendations:     Discharge Recommendations: nursing facility, skilled (with transition to correction care)  Discharge Equipment Recommendations: hospital bed  Barriers to discharge: Inaccessible home, Decreased caregiver support, and current functional status    Assessment:     Dania Her is a 82 y.o. female admitted with a medical diagnosis of Debility.  She presents with the following impairments/functional limitations: weakness, pain, gait instability, impaired balance, impaired cardiopulmonary response to activity, impaired endurance, impaired functional mobility, impaired self care skills, decreased lower extremity function, decreased ROM, decreased upper extremity function.    Supine<>sit with totalA x 2  Scoot toward HOB with totalA x 2, in trendelenburg  Roll L and R with totalA  Static sitting EOB x 25 mins with variable UE support and maxA - SBA for balance (mostly min-modA) depending on pt fatigue, level of distraction, performance of various self-care activities with VELASQUEZ; tendency to lean to R  Pt req'ing totalA for bed mobility, improved tolerance to sitting EOB this visit  Rec SNF with transition to NH    Rehab Prognosis: Fair; patient would benefit from acute skilled PT services to address these deficits and reach maximum level of function.    Recent Surgery: * No surgery found *      Plan:     During this hospitalization, patient to be seen 3 x/week to address the identified rehab impairments via gait training, therapeutic activities, therapeutic exercises and progress toward the following goals:    Plan of Care Expires:  03/15/23    Subjective     Chief Complaint: I don't feel good, and they want me out of here today  Patient/Family Comments/goals: pt wanting to get off her back  Pain/Comfort:  Pain Rating 1: 0/10  Pain Rating Post-Intervention 1: 0/10      Objective:     Communicated with nurse Fitzpatrick prior to  session.  Patient found HOB elevated with peripheral IV, oxygen, PureWick upon PT entry to room.     General Precautions: Standard, fall  Orthopedic Precautions: N/A  Braces: N/A  Respiratory Status: Nasal cannula, flow 1 L/min (NC doffed at PTA entry)     Functional Mobility:  Bed Mobility:     Rolling Left:  total assistance  Rolling Right: total assistance  Scooting: total assistance and of 2 persons  Supine to Sit: total assistance and of 2 persons  Sit to Supine: total assistance and of 2 persons      AM-PAC 6 CLICK MOBILITY  Turning over in bed (including adjusting bedclothes, sheets and blankets)?: 2  Sitting down on and standing up from a chair with arms (e.g., wheelchair, bedside commode, etc.): 1  Moving from lying on back to sitting on the side of the bed?: 2  Moving to and from a bed to a chair (including a wheelchair)?: 1  Need to walk in hospital room?: 1  Climbing 3-5 steps with a railing?: 1  Basic Mobility Total Score: 8       Treatment & Education:  Static sitting EOB x 25 mins with variable UE support and maxA - SBA for balance (mostly min-modA) depending on pt fatigue, level of distraction, performance of various self-care activities with VELASQUEZ; tendency to lean to R    Patient left HOB elevated with all lines intact, call button in reach, and PCT Ashley present..    GOALS:   Multidisciplinary Problems       Physical Therapy Goals          Problem: Physical Therapy    Goal Priority Disciplines Outcome Goal Variances Interventions   Physical Therapy Goal     PT, PT/OT Ongoing, Progressing     Description: Goals to be met by: 3/15/2023    Patient will increase functional independence with mobility by performin. Transfer supine <> sitting EOB with minimal assist.   2. Be independent with supine home exercise program to maintain current mobility                           Time Tracking:     PT Received On: 23  PT Start Time: 0958     PT Stop Time: 1038  PT Total Time (min): 40 min      Billable Minutes: Therapeutic Activity 40  Co-treat with OT due to complex nature of patient, to insure patient safety, and to prevent injury to patient and caregivers, requiring intervention of two skilled therapists.      Treatment Type: Treatment  PT/PTA: PTA     PTA Visit Number: 3     02/22/2023

## 2023-02-22 NOTE — SUBJECTIVE & OBJECTIVE
Interval History: Seen at bedside eating breakfast and talking on her cell phone. Offers no new complaints. BMP shows Ca 11, ionized calcium and pth ordered, IVF bolus ordered. Suspect hypovolemia contributing, she has dry mucous membranes on exam    Review of Systems   Constitutional:  Positive for activity change and fatigue.   Cardiovascular:  Negative for chest pain.   Gastrointestinal:  Positive for constipation (chronic). Negative for abdominal pain.   Musculoskeletal:  Positive for arthralgias and gait problem.   Neurological:  Positive for weakness. Negative for headaches.   Objective:     Vital Signs (Most Recent):  Temp: 99.1 °F (37.3 °C) (02/22/23 0744)  Pulse: 64 (02/22/23 0800)  Resp: 18 (02/22/23 0955)  BP: 135/79 (02/22/23 0744)  SpO2: 97 % (02/22/23 0744) Vital Signs (24h Range):  Temp:  [97.7 °F (36.5 °C)-99.1 °F (37.3 °C)] 99.1 °F (37.3 °C)  Pulse:  [56-79] 64  Resp:  [16-18] 18  SpO2:  [94 %-99 %] 97 %  BP: (116-145)/(64-83) 135/79     Weight: 129.1 kg (284 lb 9.8 oz)  Body mass index is 47.36 kg/m².    Intake/Output Summary (Last 24 hours) at 2/22/2023 1135  Last data filed at 2/21/2023 2130  Gross per 24 hour   Intake 480 ml   Output 1100 ml   Net -620 ml      Physical Exam  Vitals and nursing note reviewed.   Constitutional:       General: She is not in acute distress.     Appearance: Normal appearance. She is not ill-appearing.   HENT:      Mouth/Throat:      Mouth: Mucous membranes are dry.   Cardiovascular:      Rate and Rhythm: Normal rate and regular rhythm.   Pulmonary:      Effort: Pulmonary effort is normal. No respiratory distress.   Skin:     General: Skin is warm and dry.   Neurological:      General: No focal deficit present.      Mental Status: She is alert.       Significant Labs: All pertinent labs within the past 24 hours have been reviewed.    Significant Imaging: I have reviewed all pertinent imaging results/findings within the past 24 hours.

## 2023-02-22 NOTE — PROGRESS NOTES
"[facility] - [address] Medicine  Progress Note    Patient Name: Dania Her  MRN: [id number]  Patient Class: IP- Inpatient   Admission Date: 2/11/2023  Length of Stay: 7 days  Attending Physician: Ashanti Almanza MD  Primary Care Provider: Primary Doctor No        Subjective:     Principal Problem:Debility        HPI:  Per admitting provider:  "The patient is a 81 yo female with a past medical history of atrial fibrillation, HTN, IDDM, and pulmonary hypertension who presents for evaluation of increasing frequency of her standing intermittent chest pain over the past few to many days. She states the chest pain sometimes occurs with exertion and other times has no inciting factors. She endorses associated dyspnea with these episodes and reports worsening leg swelling. She also notes productive cough with clear mucus "when I have hot soup or pepper." She states she has had episodes of diaphoresis "and I get hot hot hot." She attributes this to her breast cancer related medication, denies any acute change, or relation to her episodes of chest pain.  Denies recent fever, chills, pain elsewhere, and other somatic complaints.  The patient has a mildly elevated troponin to .028, d-dimer 1.42.  She will be admitted for further management of her elevated troponin and Cardiology consult."    History clarified with the patient, she reports she is been slowly becoming more debilitated and for the last 2 months she is been unable to walk.  Therefore, she turned the living room into her bedroom as she is unable to go up the stairs.  She lives at home with her 83-year-old  who also has multiple medical issues.  He is unable to provide proper care for her and her children her only able to stop by every now and then.  She has a sitter who comes only 3 days per week.  The rest of the time, she has no one to change her or turn her.  She said it has become increasingly clear to her that she may need to move " into a nursing facility.  The reason she came to the hospital yesterday because her daughter decided to have a birthday party in the living room which is her bedroom.  She was upset about all the people invading her personal space and because she got upset all of her chronic aches and pains were exacerbated.  The chest pain she reported is the same pain she has had on and off since her mastectomy, she also reported back pain and knee pain.  She denied any associated nausea, jaw pain, arm pain, diaphoresis, or shortness of breath.      Overview/Hospital Course:  Ms. Her was placed in observation 2/11 due to exacerbations of her chronic chest, back, and knee pain brought on by emotional distress at not being well cared for at home. Troponin trend 0.028 >> 0.046 >> 0.028; EKG with NSR and TWA/inferolateral changes that are seen on prior studies per Care Everywhere. D-dimer 1.42 with negative CTA chest. Intermittently with nonsustained SVT and also pauses, caridology consulted: diltiazem discontinued, discussed with patient's cardiologist outpatient, PPM placement to be considered in the future.Palliative Care consulted, appreciate assistance. PT/OT following and recommending SNF.     Disposition plans: CM/SW following with plans for detention placement since SNF was denied by insurance.  Cardiology and PCP follow up advised at [place]; follows with Cardiology at [facility].       Interval History: Seen at bedside eating breakfast and talking on her cell phone. Offers no new complaints. BMP shows Ca 11, ionized calcium and pth ordered, IVF bolus ordered. Suspect hypovolemia contributing, she has dry mucous membranes on exam    Review of Systems   Constitutional:  Positive for activity change and fatigue.   Cardiovascular:  Negative for chest pain.   Gastrointestinal:  Positive for constipation (chronic). Negative for abdominal pain.   Musculoskeletal:  Positive for arthralgias and gait problem.   Neurological:  Positive for  weakness. Negative for headaches.   Objective:     Vital Signs (Most Recent):  Temp: 99.1 °F (37.3 °C) (02/22/23 0744)  Pulse: 64 (02/22/23 0800)  Resp: 18 (02/22/23 0955)  BP: 135/79 (02/22/23 0744)  SpO2: 97 % (02/22/23 0744) Vital Signs (24h Range):  Temp:  [97.7 °F (36.5 °C)-99.1 °F (37.3 °C)] 99.1 °F (37.3 °C)  Pulse:  [56-79] 64  Resp:  [16-18] 18  SpO2:  [94 %-99 %] 97 %  BP: (116-145)/(64-83) 135/79     Weight: 129.1 kg (284 lb 9.8 oz)  Body mass index is 47.36 kg/m².    Intake/Output Summary (Last 24 hours) at 2/22/2023 1135  Last data filed at 2/21/2023 2130  Gross per 24 hour   Intake 480 ml   Output 1100 ml   Net -620 ml      Physical Exam  Vitals and nursing note reviewed.   Constitutional:       General: She is not in acute distress.     Appearance: Normal appearance. She is not ill-appearing.   HENT:      Mouth/Throat:      Mouth: Mucous membranes are dry.   Cardiovascular:      Rate and Rhythm: Normal rate and regular rhythm.   Pulmonary:      Effort: Pulmonary effort is normal. No respiratory distress.   Skin:     General: Skin is warm and dry.   Neurological:      General: No focal deficit present.      Mental Status: She is alert.       Significant Labs: All pertinent labs within the past 24 hours have been reviewed.    Significant Imaging: I have reviewed all pertinent imaging results/findings within the past 24 hours.      Assessment/Plan:      * Debility  Decreased functional mobility and endurance  Chronic chest wall pain  Cancer related pain  Bilateral primary osteoarthritis of knee  Chronic pain of both shoulders  -placed in observation 2/11 due to exacerbations of her chronic chest, back, and knee pain brought on by emotional distress at not being well cared for at home  -reports ongoing chronic pain that improves with PRN supportive care  -Palliative Care consulted, appreciate assistance  -PT/OT following  -PRN supportive care   -dose/medication adjustment as appropriate   -fall  precautions  -monitor   -CM/SW following with plans for [facility]    Hypercalcemia  Calcium ranging 10-11  Will check ionized calcium and pth  Medications reviewed  Giving IVF bolus 2/22 as she looks dry on exam      Tachy-angeline syndrome   Multiple sinus pauses at least 4 seconds, as well as nonsustained SVT  -diltiazem stopped  -will likely need pacemaker.  Discussed with Dr. Block (EP at [facility])     Pyuria  UCx showing ESBL E. Coli  Patient is afebrile, asymptomatic  Will not treat at this time      Breast cancer  -chronic, s/p mastectomy with chronic chest wall pain  -continue home anastrozole    Atrial fibrillation  History of SVT  History of DVT of lower extremity  Long term current use of anticoagulants  -chronic, controlled mostly  -remains HDS (bouts of brief, self limiting AFib/SVT on tele (chronic in nature per Care Everywhere)  -continue home diltiazem, flecainide, and apixaban  -dose/medication adjustment as appropriate   -continue tele monitoring  -EKG PRN concerns   -Cardiology and PCP follow up advised at [place]; follows with Cardiology at [facility]    Type 2 diabetes mellitus, with long-term current use of insulin  -chronic, not well controlled at home  -HgA1C 8.1  -hold home glyburide  -LDSSI initiated  -dose/medication adjustment as appropriate   -monitor accuchecks AC/HS and PRN hypoglycemic protocol     Primary hypertension  -chronic, controlled  -continue home diltiazem, flecainide, furosemide, HCTZ, and lisinopril   -dose/medication adjustment as appropriate   -monitor     Elevated troponin  -placed in observation 2/11 due to exacerbations of her chronic chest, back, and knee pain brought on by emotional distress at not being well cared for at home  -unlikely ACS  -troponin trend 0.028 >> 0.046 >> 0.028  -EKG with NSR and TWA/inferolateral changes that are seen on prior studies per Care Everywhere  -D-dimer 1.42 with negative CTA chest  -remains HDS (bouts of brief, self limiting AFib/SVT on tele  (chronic in nature per Care Everywhere)  -continue home diltiazem, flecainide, furosemide, HCTZ, and lisinopril   -dose/medication adjustment as appropriate   -EKG PRN concerns  -monitor   -Cardiology and PCP follow up advised at [place]; follows with Cardiology at [facility]      VTE Risk Mitigation (From admission, onward)         Ordered     apixaban tablet 5 mg  2 times daily         02/12/23 1207     IP VTE HIGH RISK PATIENT  Once         02/12/23 0256     Place sequential compression device  Until discontinued         02/12/23 0256                Discharge Planning   VALENCIA: 2/22/2023     Code Status: Full Code   Is the patient medically ready for discharge?:     Reason for patient still in hospital (select all that apply): Patient trending condition  Discharge Plan A: New Nursing Home placement - [facility] care facility                  Franca Reyes PA-C  Department of Hospital Medicine   [facility] ([address])

## 2023-02-22 NOTE — PT/OT/SLP PROGRESS
Occupational Therapy   Treatment    Name: Dania Her  MRN: [id number]  Admitting Diagnosis:  Debility       Recommendations:     Discharge Recommendations: nursing facility, skilled (with transition to penitentiary care)  Discharge Equipment Recommendations:  hospital bed  Barriers to discharge:  Decreased caregiver support    Assessment:     Dania Her is a 82 y.o. female with a medical diagnosis of Debility.  She was alert and pleasant. Performance deficits affecting function are weakness, impaired endurance, impaired self care skills, impaired functional mobility, gait instability, impaired balance, decreased coordination, decreased upper extremity function, decreased lower extremity function, decreased safety awareness, pain, decreased ROM, impaired coordination, impaired skin.     Rehab Prognosis:  Fair; patient would benefit from acute skilled OT services to address these deficits and reach maximum level of function.     Supine <> Sit: Total A x 2   Scoot EOB: Total A   Sitting EOB: Tolerated approx 25 min fluctuating between Max A, Mod A, Min A, and SBA due to patient fatiguing and leaning laterally to the right side while sitting EOB   Grooming: Max A and tactile cues to apply moisturizer on the neck while supine in bed    Sponge Bath: Max A with tactile cues for hand placement while sitting EOB  Toileting: Uses purwick but requires Total A for toilet hygiene while supine in bed  Scoot HOB: Total A x 2 and bed positioned in trendelenburg   Bilateral Rolling: Total A     Plan:     Patient to be seen 4 x/week to address the above listed problems via self-care/home management  Plan of Care Expires: 02/26/23  Plan of Care Reviewed with: patient    Subjective     Pain/Comfort:  Pain Rating 1:  (notice of wincing and grimicing during tx session)  Location - Side 1: Bilateral  Location - Orientation 1: generalized  Location 1:  (lower extremities and back)  Pain Addressed 1: Distraction, Cessation of Activity,  Reposition    Objective:     Communicated with: RN prior to session.  Patient found supine with oxygen, peripheral IV, PureWick upon OT entry to room. PCT was present during tx session.    General Precautions: Standard, fall    Orthopedic Precautions:N/A  Braces: N/A  Respiratory Status: Nasal cannula, flow 1 L/min     Occupational Performance:     Bed Mobility:    Supine <> Sit: Total A x 2   Scoot EOB: Total A   Sitting EOB: Tolerated approx 25 min fluctuating between Max A, Mod A, Min A, and SBA due to patient fatiguing and leaning laterally to the right side while sitting EOB   Scoot HOB: Total A x 2 and bed positioned in trendelenburg   Bilateral Rolling: Total A        Activities of Daily Living:  Grooming: Max A and tactile cues to apply moisturizer on the neck while in bed    Sponge Bath: While sitting EOB, patient performed face washing and UE anterior sponge bath with Max A and tactile cues for hand placement. Patient required Total A for posterior UE sponge bath while sitting EOB.     Toielting: Pt uses purwick but required Total A for toilet hygiene while supine in bed      [facility] 6 Click ADL: 11    Treatment & Education:  OT role, plan of care, progression of goals, importance of continued OOB activity, ADL/functional transfer and mobility retraining, discharge recommendation, call don't fall, safety precautions, fall prevention, breathing techniques for pain management     Patient left supine with all lines intact and call button in reach and PCT present.    GOALS:   Multidisciplinary Problems       Occupational Therapy Goals          Problem: Occupational Therapy    Goal Priority Disciplines Outcome Interventions   Occupational Therapy Goal     OT, PT/OT Ongoing, Progressing    Description: Goals to be met by: 2/26/2023     Patient will increase functional independence with ADLs by performing:    Grooming while EOB with Supervision and Set Up Assistance.  Sitting at edge of bed x15 minutes with  Supervision.  Rolling to Bilateral with Moderate Assistance.   Supine to sit with Maximum Assistance x1.                         Time Tracking:     OT Date of Treatment: 02/22/23  OT Start Time: 0959  OT Stop Time: 1038  OT Total Time (min): 39 min    Billable Minutes:Self Care/Home Management 39 min    OT/BRYANT: BRYANT HURTADO Visit Number: 2    Co treament performed due to patient's multiple deficits requiring two skilled therapists to safely assess patient's ability to complete ADLs and functional mobility in addition to accommodating for patient's activity tolerance while in acute care setting.      2/22/2023

## 2023-02-22 NOTE — ASSESSMENT & PLAN NOTE
Calcium ranging 10-11  Will check ionized calcium and pth  Medications reviewed  Giving IVF bolus 2/22 as she looks dry on exam

## 2023-02-22 NOTE — CARE UPDATE
02/21/23 1914   Patient Assessment/Suction   Level of Consciousness (AVPU) alert   Respiratory Effort Normal;Unlabored   Expansion/Accessory Muscles/Retractions no retractions;expansion symmetric;no use of accessory muscles   All Lung Fields Breath Sounds diminished   Rhythm/Pattern, Respiratory depth regular;pattern regular;unlabored   Cough Frequency no cough   Skin Integrity   $ Wound Care Tech Time 15 min   Area Observed Left;Right;Behind ear;Cheek   Skin Appearance without discoloration   PRE-TX-O2   Device (Oxygen Therapy) nasal cannula   $ Is the patient on Low Flow Oxygen? Yes   Flow (L/min) 1   Oxygen Concentration (%) 24   SpO2 98 %   Pulse Oximetry Type Intermittent   $ Pulse Oximetry - Multiple Charge Pulse Oximetry - Multiple   Pulse 76   Resp 18   Ready to Wean/Extubation Screen   FIO2<=50 (chart decimal) 0.24   Preset CPAP/BiPAP Settings   Mode Of Delivery Standby;BiPAP   $ Is patient using? No/refused   Reason patient is not wearing? Patient refused

## 2023-02-22 NOTE — PLAN OF CARE
Received report from CHANDA Connell. Pt lying in bed asleep. No discomfort or distress noted at present. Will continue to monitor.

## 2023-02-22 NOTE — PLAN OF CARE
Dania is A&Ox4. Vitals stable. Denies pain. Bmx1. Normal Saline Bolus administered per orders. Repositioned as tolerated. Discharge pending placement.    Problem: Adult Inpatient Plan of Care  Goal: Plan of Care Review  2/22/2023 1044 by Nanette Colindres RN  Outcome: Ongoing, Progressing  2/22/2023 1043 by Nanette Colindres RN  Outcome: Ongoing, Not Progressing  Goal: Patient-Specific Goal (Individualized)  2/22/2023 1044 by Nanette Colindres RN  Outcome: Ongoing, Progressing  2/22/2023 1043 by Nanette Colindres RN  Outcome: Ongoing, Not Progressing  Goal: Absence of Hospital-Acquired Illness or Injury  2/22/2023 1044 by Nanette Colindres RN  Outcome: Ongoing, Progressing  2/22/2023 1043 by Nanette Colindres RN  Outcome: Ongoing, Not Progressing  Goal: Optimal Comfort and Wellbeing  2/22/2023 1044 by Nanette Colindres RN  Outcome: Ongoing, Progressing  2/22/2023 1043 by Nanette Colindres RN  Outcome: Ongoing, Not Progressing  Goal: Readiness for Transition of Care  2/22/2023 1044 by Nanette Colindres RN  Outcome: Ongoing, Progressing  2/22/2023 1043 by Nanette Colindres RN  Outcome: Ongoing, Not Progressing     Problem: Adjustment to Illness (Acute Coronary Syndrome)  Goal: Optimal Adaptation to Illness  2/22/2023 1044 by Nanette Colindres RN  Outcome: Ongoing, Progressing  2/22/2023 1043 by Nanette Colindres RN  Outcome: Ongoing, Not Progressing     Problem: Dysrhythmia (Acute Coronary Syndrome)  Goal: Normalized Cardiac Rhythm  2/22/2023 1044 by Nanette Colindres RN  Outcome: Ongoing, Progressing  2/22/2023 1043 by Nanette Colindres RN  Outcome: Ongoing, Not Progressing     Problem: Cardiac-Related Pain (Acute Coronary Syndrome)  Goal: Absence of Cardiac-Related Pain  2/22/2023 1044 by Nanette Colindres RN  Outcome: Ongoing, Progressing  2/22/2023 1043 by Nanette Colindres RN  Outcome: Ongoing, Not Progressing     Problem: Hemodynamic Instability (Acute Coronary Syndrome)  Goal: Effective Cardiac Pump Function  2/22/2023 1044 by Nanette  CHANDA Colindres  Outcome: Ongoing, Progressing  2/22/2023 1043 by Nanette Colindres RN  Outcome: Ongoing, Not Progressing     Problem: Tissue Perfusion (Acute Coronary Syndrome)  Goal: Adequate Tissue Perfusion  2/22/2023 1044 by Nanette Colindres RN  Outcome: Ongoing, Progressing  2/22/2023 1043 by Nanette Colindres RN  Outcome: Ongoing, Not Progressing     Problem: Arrhythmia/Dysrhythmia (Cardiac Catheterization)  Goal: Stable Heart Rate and Rhythm  2/22/2023 1044 by Nanette Colindres RN  Outcome: Ongoing, Progressing  2/22/2023 1043 by Nanette Colindres RN  Outcome: Ongoing, Not Progressing     Problem: Embolism (Cardiac Catheterization)  Goal: Absence of Embolism Signs and Symptoms  2/22/2023 1044 by Nanette Colindres RN  Outcome: Ongoing, Progressing  2/22/2023 1043 by Nanette Colindres RN  Outcome: Ongoing, Not Progressing     Problem: Skin Injury Risk Increased  Goal: Skin Health and Integrity  2/22/2023 1044 by Nanette Colindres RN  Outcome: Ongoing, Progressing  2/22/2023 1043 by Nanette Colindres RN  Outcome: Ongoing, Not Progressing     Problem: Diabetes Comorbidity  Goal: Blood Glucose Level Within Targeted Range  2/22/2023 1044 by Nanette Colindres RN  Outcome: Ongoing, Progressing  2/22/2023 1043 by Nanette Colindres RN  Outcome: Ongoing, Not Progressing     Problem: Impaired Wound Healing  Goal: Optimal Wound Healing  2/22/2023 1044 by Nanette Colindres RN  Outcome: Ongoing, Progressing  2/22/2023 1043 by Nanette Colindres RN  Outcome: Ongoing, Not Progressing     Problem: Bariatric Environmental Safety  Goal: Safety Maintained with Care  2/22/2023 1044 by Nanette Colindres RN  Outcome: Ongoing, Progressing  2/22/2023 1043 by Nanette Colindres RN  Outcome: Ongoing, Not Progressing     Problem: Infection  Goal: Absence of Infection Signs and Symptoms  2/22/2023 1044 by Nanette Colindres RN  Outcome: Ongoing, Progressing  2/22/2023 1043 by Nanette Colindres RN  Outcome: Ongoing, Not Progressing     Problem: Fall Injury Risk  Goal: Absence  of Fall and Fall-Related Injury  2/22/2023 1044 by Nanette Colindres, RN  Outcome: Ongoing, Progressing  2/22/2023 1043 by Nanette Colindres, RN  Outcome: Ongoing, Not Progressing

## 2023-02-23 ENCOUNTER — PATIENT OUTREACH (OUTPATIENT)
Dept: ADMINISTRATIVE | Facility: OTHER | Age: 83
End: 2023-02-23
Payer: MEDICARE

## 2023-02-23 VITALS
TEMPERATURE: 98 F | OXYGEN SATURATION: 94 % | DIASTOLIC BLOOD PRESSURE: 71 MMHG | SYSTOLIC BLOOD PRESSURE: 132 MMHG | WEIGHT: 284.63 LBS | RESPIRATION RATE: 18 BRPM | HEIGHT: 65 IN | BODY MASS INDEX: 47.42 KG/M2 | HEART RATE: 60 BPM

## 2023-02-23 LAB
ANION GAP SERPL CALC-SCNC: 6 MMOL/L (ref 8–16)
BUN SERPL-MCNC: 9 MG/DL (ref 8–23)
CALCIUM SERPL-MCNC: 10.9 MG/DL (ref 8.7–10.5)
CHLORIDE SERPL-SCNC: 98 MMOL/L (ref 95–110)
CO2 SERPL-SCNC: 33 MMOL/L (ref 23–29)
CREAT SERPL-MCNC: 0.7 MG/DL (ref 0.5–1.4)
EST. GFR  (NO RACE VARIABLE): >60 ML/MIN/1.73 M^2
GLUCOSE SERPL-MCNC: 168 MG/DL (ref 70–110)
POCT GLUCOSE: 162 MG/DL (ref 70–110)
POCT GLUCOSE: 194 MG/DL (ref 70–110)
POTASSIUM SERPL-SCNC: 4.1 MMOL/L (ref 3.5–5.1)
SODIUM SERPL-SCNC: 137 MMOL/L (ref 136–145)

## 2023-02-23 PROCEDURE — 25000003 PHARM REV CODE 250: Performed by: NURSE PRACTITIONER

## 2023-02-23 PROCEDURE — 80048 BASIC METABOLIC PNL TOTAL CA: CPT | Performed by: PHYSICIAN ASSISTANT

## 2023-02-23 PROCEDURE — 36415 COLL VENOUS BLD VENIPUNCTURE: CPT | Performed by: PHYSICIAN ASSISTANT

## 2023-02-23 PROCEDURE — 97535 SELF CARE MNGMENT TRAINING: CPT | Mod: CO

## 2023-02-23 PROCEDURE — 97530 THERAPEUTIC ACTIVITIES: CPT | Mod: CO

## 2023-02-23 PROCEDURE — 25000003 PHARM REV CODE 250: Performed by: PHYSICIAN ASSISTANT

## 2023-02-23 PROCEDURE — 99239 HOSP IP/OBS DSCHRG MGMT >30: CPT | Mod: ,,, | Performed by: PHYSICIAN ASSISTANT

## 2023-02-23 PROCEDURE — 94761 N-INVAS EAR/PLS OXIMETRY MLT: CPT

## 2023-02-23 PROCEDURE — 99239 PR HOSPITAL DISCHARGE DAY,>30 MIN: ICD-10-PCS | Mod: ,,, | Performed by: PHYSICIAN ASSISTANT

## 2023-02-23 RX ADMIN — FUROSEMIDE 20 MG: 20 TABLET ORAL at 08:02

## 2023-02-23 RX ADMIN — LISINOPRIL 2.5 MG: 2.5 TABLET ORAL at 08:02

## 2023-02-23 RX ADMIN — ESCITALOPRAM OXALATE 10 MG: 10 TABLET ORAL at 08:02

## 2023-02-23 RX ADMIN — ATORVASTATIN CALCIUM 80 MG: 20 TABLET, FILM COATED ORAL at 08:02

## 2023-02-23 RX ADMIN — DICLOFENAC 4 G: 10 GEL TOPICAL at 08:02

## 2023-02-23 RX ADMIN — GUAIFENESIN 600 MG: 600 TABLET, EXTENDED RELEASE ORAL at 08:02

## 2023-02-23 RX ADMIN — FLECAINIDE ACETATE 100 MG: 50 TABLET ORAL at 08:02

## 2023-02-23 RX ADMIN — APIXABAN 5 MG: 2.5 TABLET, FILM COATED ORAL at 08:02

## 2023-02-23 RX ADMIN — HYDROCODONE BITARTRATE AND ACETAMINOPHEN 1 TABLET: 5; 325 TABLET ORAL at 09:02

## 2023-02-23 RX ADMIN — POLYETHYLENE GLYCOL 3350 17 G: 17 POWDER, FOR SOLUTION ORAL at 08:02

## 2023-02-23 RX ADMIN — MICONAZOLE NITRATE: 20 POWDER TOPICAL at 08:02

## 2023-02-23 RX ADMIN — SENNOSIDES AND DOCUSATE SODIUM 1 TABLET: 50; 8.6 TABLET ORAL at 08:02

## 2023-02-23 RX ADMIN — ANASTROZOLE 1 MG: 1 TABLET, COATED ORAL at 08:02

## 2023-02-23 NOTE — PLAN OF CARE
Dania is A&Ox4. Pain managed with hydrocodone PRN. Vitals stable. Orders in place for discharge with [facility]. Transportation estimated to arrive at 12:30pm. Peripheral IV removed. Belongings gathered. AVS reviewed.     Problem: Adult Inpatient Plan of Care  Goal: Plan of Care Review  Outcome: Met  Goal: Patient-Specific Goal (Individualized)  Outcome: Met  Goal: Absence of Hospital-Acquired Illness or Injury  Outcome: Met  Goal: Optimal Comfort and Wellbeing  Outcome: Met  Goal: Readiness for Transition of Care  Outcome: Met     Problem: Adjustment to Illness (Acute Coronary Syndrome)  Goal: Optimal Adaptation to Illness  Outcome: Met     Problem: Dysrhythmia (Acute Coronary Syndrome)  Goal: Normalized Cardiac Rhythm  Outcome: Met     Problem: Cardiac-Related Pain (Acute Coronary Syndrome)  Goal: Absence of Cardiac-Related Pain  Outcome: Met     Problem: Hemodynamic Instability (Acute Coronary Syndrome)  Goal: Effective Cardiac Pump Function  Outcome: Met     Problem: Tissue Perfusion (Acute Coronary Syndrome)  Goal: Adequate Tissue Perfusion  Outcome: Met     Problem: Arrhythmia/Dysrhythmia (Cardiac Catheterization)  Goal: Stable Heart Rate and Rhythm  Outcome: Met     Problem: Embolism (Cardiac Catheterization)  Goal: Absence of Embolism Signs and Symptoms  Outcome: Met     Problem: Skin Injury Risk Increased  Goal: Skin Health and Integrity  Outcome: Met     Problem: Diabetes Comorbidity  Goal: Blood Glucose Level Within Targeted Range  Outcome: Met     Problem: Impaired Wound Healing  Goal: Optimal Wound Healing  Outcome: Met     Problem: Bariatric Environmental Safety  Goal: Safety Maintained with Care  Outcome: Met     Problem: Infection  Goal: Absence of Infection Signs and Symptoms  Outcome: Met     Problem: Fall Injury Risk  Goal: Absence of Fall and Fall-Related Injury  Outcome: Met

## 2023-02-23 NOTE — PROGRESS NOTES
Patient resting well on room air, patient refuses to wear bipap at night, will continue to monitor

## 2023-02-23 NOTE — CARE UPDATE
02/22/23 2000   Patient Assessment/Suction   Level of Consciousness (AVPU) alert   Respiratory Effort Normal;Unlabored   Expansion/Accessory Muscles/Retractions expansion symmetric;no retractions;no use of accessory muscles   All Lung Fields Breath Sounds diminished   Rhythm/Pattern, Respiratory depth regular;pattern regular;unlabored   Cough Frequency no cough   Skin Integrity   $ Wound Care Tech Time 15 min   Area Observed Left;Right;Behind ear;Cheek   Skin Appearance without discoloration   PRE-TX-O2   Device (Oxygen Therapy) nasal cannula   $ Is the patient on Low Flow Oxygen? Yes   Flow (L/min) 1   Oxygen Concentration (%) 24   SpO2 95 %   Pulse Oximetry Type Intermittent   $ Pulse Oximetry - Multiple Charge Pulse Oximetry - Multiple   Ready to Wean/Extubation Screen   FIO2<=50 (chart decimal) 0.24   Preset CPAP/BiPAP Settings   Mode Of Delivery BiPAP;Standby   $ Is patient using? No/refused   Reason patient is not wearing? Patient refused

## 2023-02-23 NOTE — PT/OT/SLP PROGRESS
Occupational Therapy   Treatment    Name: Dania Her  MRN: [id number]  Admitting Diagnosis:  Debility       Recommendations:     Discharge Recommendations: nursing facility, skilled  Discharge Equipment Recommendations:  hospital bed  Barriers to discharge:  Decreased caregiver support    Assessment:     Dania Her is a 82 y.o. female with a medical diagnosis of Debility. She agreeable to therapy and pleasant. Performance deficits affecting function are weakness, impaired endurance, impaired self care skills, impaired functional mobility, gait instability, impaired balance, decreased coordination, decreased upper extremity function, decreased lower extremity function, decreased safety awareness, pain, decreased ROM, impaired skin.     Rehab Prognosis:  Fair; patient would benefit from acute skilled OT services to address these deficits and reach maximum level of function.       Sit <> Supine: Total A x 2  Scoot EOB: Max A   Sitting EOB: Tolerated approx 25 min with fluctuating between Max A, Mod A, and Min A due to fatiguing with right lateral leaning.   Grooming: performed sitting EOB   Combing Hair: Max A for external rotation due to UE weakness   Oral Care: Set Up and SBA  Scoot HOB: Total A x 2 and bed positioned in trendelenburg     Plan:     Patient to be seen 4 x/week to address the above listed problems via self-care/home management, therapeutic activities  Plan of Care Expires: 02/26/23  Plan of Care Reviewed with: patient    Subjective     Pain/Comfort:  Pain Rating 1:  (no pain rate but patient display facial discomfort and wincing during session)  Location - Side 1: Bilateral  Location - Orientation 1: lower  Location 1: knee  Pain Addressed 1: Reposition, Distraction, Cessation of Activity, Nurse notified    Objective:     Communicated with: RN prior to session.  Patient found supine and sleeping with oxygen, peripheral IV, PureWick upon OT entry to room.    General Precautions: Standard, fall     Orthopedic Precautions:N/A  Braces: N/A  Respiratory Status: Nasal cannula, flow 1 L/min     Occupational Performance:     Bed Mobility:    Sit <> Supine: Total A x 2  Scoot EOB: Max A   Sitting EOB: Tolerated approx 25 min with fluctuating between Max A, Mod A, and Min A due to fatiguing with right lateral leaning.   Scoot HOB: Total A x 2 and bed positioned in trendelenburg        Activities of Daily Living:  Grooming: performed sitting EOB  Combing Hair: Max A for external rotation due to UE weakness  Oral Care: Set Up and SBA      [facility] 6 Click ADL: 11    Treatment & Education:  OT role, plan of care, progression of goals,ADL/functional transfer, discharge recommendation, call don't fall, safety precautions, fall prevention, breathing techniques for pain management     Patient left HOB elevated with all lines intact and call button in reach    GOALS:   Multidisciplinary Problems       Occupational Therapy Goals          Problem: Occupational Therapy    Goal Priority Disciplines Outcome Interventions   Occupational Therapy Goal     OT, PT/OT Ongoing, Not Progressing    Description: Goals to be met by: 2/26/2023     Patient will increase functional independence with ADLs by performing:    Grooming while EOB with Supervision and Set Up Assistance.  Sitting at edge of bed x15 minutes with Supervision.  Rolling to Bilateral with Moderate Assistance.   Supine to sit with Maximum Assistance x1.                         Time Tracking:     OT Date of Treatment: 02/23/23  OT Start Time: 0806  OT Stop Time: 0847  OT Total Time (min): 41 min    Billable Minutes:Self Care/Home Management 29 min  Therapeutic Activity 12 min    OT/BRYANT: BRYANT HURTADO Visit Number: 3    2/23/2023

## 2023-02-23 NOTE — ASSESSMENT & PLAN NOTE
Decreased functional mobility and endurance  Chronic chest wall pain  Cancer related pain  Bilateral primary osteoarthritis of knee  Chronic pain of both shoulders  -placed in observation 2/11 due to exacerbations of her chronic chest, back, and knee pain brought on by emotional distress at not being well cared for at home  -reports ongoing chronic pain that improves with PRN supportive care  -Palliative Care consulted, appreciate assistance  -PT/OT following: rec SNF  -PRN supportive care   -dose/medication adjustment as appropriate   -fall precautions  -monitor   -CM/SW following with plans for jail [facility] given snf was denied; ultimately family decided on discharge with [facility]

## 2023-02-23 NOTE — PROGRESS NOTES
IP Liaison - Final Visit Note    Patient: Dania Her  MRN:  [id number]  Date of Service:  2/23/2023  Completed by:  PARKER Castorena    Reason for Visit   Patient presents with    IP Liaison Follow-up Visit            Patient Summary     Discharge Date: 2/23/2023  Discharge telephone number/address: [phone] /[phone] [facility] [phone]  Follow up provider: Patient daughter to make appt  Follow up appointments: n/a  Home Health agency & telephone number: [phone]  DME ordered &  name: n/a  Assigned OPCM RN/SW: n/a  Report sent to follow up team (PCP/OPCM) via in basket message: n/a  Community Resources arranged including agency name & contact info: none    PARKER Castorena

## 2023-02-23 NOTE — PLAN OF CARE
02/23/23 1025   Final Note   Assessment Type Final Discharge Note   Anticipated Discharge Disposition Home-Health   Hospital Resources/Appts/Education Provided Provided patient/caregiver with written discharge plan information;Appointments scheduled and added to AVS;Appointments scheduled by Navigator/Coordinator   Post-Acute Status   Post-Acute Authorization Home Health   Home Health Status Set-up Complete/Auth obtained   Discharge Delays None known at this time     Patient will discharge home with home health. ([facility])    Patient will be transported by [facility] ambulance.     All discharge needs have been addressed. (Verbal consent on patient choice from daughter)

## 2023-02-23 NOTE — ASSESSMENT & PLAN NOTE
Calcium ranging 10-11  Will check ionized calcium and pth: PTH high, ionized ca WNL; fu with pcp  Medications reviewed  Giving IVF bolus 2/22 as she looks dry on exam >> Ca 10.9

## 2023-02-23 NOTE — PLAN OF CARE
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]  Yes    Pyuria [R82.81]  Yes    Chronic chest wall pain [R07.89, G89.29]  Yes    Chronic pain of both shoulders [M25.511, G89.29, M25.512]  Yes    Decreased functional mobility and endurance [Z74.09]  Yes    History of supraventricular tachycardia [Z86.79]  Not Applicable    Elevated troponin [R77.8]  Yes    Primary hypertension [I10]  Yes    Type 2 diabetes mellitus, with long-term current use of insulin [E11.9, Z79.4]  Not Applicable    Atrial fibrillation [I48.91]  Yes    Breast cancer [C50.919]  Yes    Cancer related pain [G89.3]  Yes    Long term current use of anticoagulant [Z79.01]  Not Applicable    History of DVT of lower extremity [Z86.718]  Not Applicable    Bilateral primary osteoarthritis of knee [M17.0]  Yes      Resolved Hospital Problems   No resolved problems to display.       Follow Up Appointments:  No future appointments.    Allergies:Review of patient's allergies indicates:  No Known Allergies    Medications: Review discharge medications with patient and family and provide education.    Current Facility-Administered Medications   Medication Dose Route Frequency Provider Last Rate Last Admin    acetaminophen tablet 650 mg  650 mg Oral Q6H PRN Kassie Navarro NP        anastrozole tablet 1 mg  1 mg Oral Daily Tala Arroyo PA-C   1 mg at 02/23/23 0850    apixaban tablet 5 mg  5 mg Oral BID Tlaa Arroyo PA-C   5 mg at 02/23/23 0850    atorvastatin tablet 80 mg  80 mg Oral Daily Francisco Bliss NP   80 mg at 02/23/23 0850    bisacodyL suppository 10 mg  10 mg  Patient would like communication of their results via:        Cell Phone:   Telephone Information:   Mobile [phone]     Okay to leave a message containing results? Yes     Rectal Daily PRN Kassie Navarro NP        dextrose 10% bolus 125 mL 125 mL  12.5 g Intravenous PRN Francisco Bliss NP        dextrose 10% bolus 250 mL 250 mL  25 g Intravenous PRN Francisco Bliss NP        diclofenac sodium 1 % gel 4 g  4 g Topical (Top) Daily Franca Reyes PA-C   4 g at 02/23/23 0855    EScitalopram oxalate tablet 10 mg  10 mg Oral Daily Francisco Bliss NP   10 mg at 02/23/23 0850    flecainide tablet 100 mg  100 mg Oral Q12H Tala Arroyo PA-C   100 mg at 02/23/23 0850    furosemide tablet 20 mg  20 mg Oral BID Francisco Bliss NP   20 mg at 02/23/23 0850    glucagon (human recombinant) injection 1 mg  1 mg Intramuscular PRN Francisco Bliss NP        glucose chewable tablet 16 g  16 g Oral PRN Francisco Bliss NP        glucose chewable tablet 24 g  24 g Oral PRN Francisco Bliss NP        guaiFENesin 12 hr tablet 600 mg  600 mg Oral BID Franca Reyes PA-C   600 mg at 02/23/23 0850    HYDROcodone-acetaminophen 5-325 mg per tablet 1 tablet  1 tablet Oral BID PRN Franca Reyes PA-C   1 tablet at 02/23/23 0928    insulin aspart U-100 pen 0-5 Units  0-5 Units Subcutaneous QID (AC + HS) PRN Francisco Bliss NP   1 Units at 02/22/23 2158    lisinopriL tablet 2.5 mg  2.5 mg Oral Daily Tala Arroyo PA-C   2.5 mg at 02/23/23 0850    metoprolol injection 5 mg  5 mg Intravenous Q5 Min PRN Kassie Navarro NP   5 mg at 02/13/23 1602    miconazole NITRATE 2 % top powder   Topical (Top) BID Tala Arroyo PA-C   Given at 02/23/23 0855    pantoprazole EC tablet 40 mg  40 mg Oral Daily PRN Tala Arroyo PA-C   40 mg at 02/14/23 1047    polyethylene glycol packet 17 g  17 g Oral Daily Kassie Navarro NP   17 g at 02/23/23 0851    senna-docusate 8.6-50 mg per tablet 1 tablet  1 tablet Oral BID Kassie Navarro NP   1 tablet at 02/23/23 0850    sodium chloride 0.9% flush 10 mL  10 mL Intravenous PRN Miki Horton MD         tiZANidine tablet 2 mg  2 mg Oral BID PRN Tala Arroyo PA-C        traMADoL tablet 50 mg  50 mg Oral Q6H PRN Franca Reyes PA-C   50 mg at 02/22/23 2201    traZODone tablet 50 mg  50 mg Oral QHS Tala Arroyo PA-C   50 mg at 02/22/23 2136     Current Discharge Medication List        CONTINUE these medications which have NOT CHANGED    Details   anastrozole (ARIMIDEX) 1 mg Tab Take 1 mg by mouth once daily.      cholecalciferol, vitamin D3, 1,250 mcg (50,000 unit) capsule Take 50,000 Units by mouth every 30 days.      diphenhydrAMINE (BENADRYL) 25 mg capsule Take 25 mg by mouth nightly as needed for Itching or Insomnia.      ELIQUIS 5 mg Tab Take 5 mg by mouth 2 (two) times daily.      escitalopram oxalate (LEXAPRO) 10 MG tablet Take 10 mg by mouth once daily.      flecainide (TAMBOCOR) 100 MG Tab Take 100 mg by mouth every 12 (twelve) hours.      furosemide (LASIX) 20 MG tablet Take 20 mg by mouth 2 (two) times daily.      glyBURIDE (DIABETA) 5 MG tablet Take 5 mg by mouth every morning.      HYDROcodone-acetaminophen (NORCO) 5-325 mg per tablet Take 1 tablet by mouth 2 (two) times daily as needed for Pain.      nystatin (MYCOSTATIN) powder Apply 1 spray topically 2 (two) times a day.      pantoprazole (PROTONIX) 20 MG tablet Take 20 mg by mouth daily as needed for Heartburn.      polyethylene glycol (GLYCOLAX) 17 gram/dose powder Take 17 g by mouth daily as needed.      ramipriL (ALTACE) 2.5 MG capsule Take 2.5 mg by mouth once daily.      senna-docusate 8.6-50 mg (PERICOLACE) 8.6-50 mg per tablet Take 1 tablet by mouth once daily.      tiZANidine (ZANAFLEX) 2 MG tablet Take 2 mg by mouth 2 (two) times daily as needed for Muscle spasms.      tramadol (ULTRAM) 50 mg tablet Take 50 mg by mouth every 6 (six) hours as needed for Pain.      traZODone (DESYREL) 50 MG tablet Take 50 mg by mouth every evening.           STOP taking these medications       diltiaZEM (CARDIZEM CD) 120 MG Cp24 Comments:    Reason for Stopping:         hydrochlorothiazide (HYDRODIURIL) 25 MG tablet Comments:   Reason for Stopping:                 I have seen and examined this patient within the last 30 days. My clinical findings that support the need for the home health skilled services and home bound status are the following:no   Weakness/numbness causing balance and gait disturbance due to Weakness/Debility making it taxing to leave home.     Diet:   cardiac diet and diabetic diet 2000 calorie 1500mL fluid    Labs:  N/a    Referrals/ Consults  Physical Therapy to evaluate and treat. Evaluate for home safety and equipment needs; Establish/upgrade home exercise program. Perform / instruct on therapeutic exercises, gait training, transfer training, and Range of Motion.  Occupational Therapy to evaluate and treat. Evaluate home environment for safety and equipment needs. Perform/Instruct on transfers, ADL training, ROM, and therapeutic exercises.    Activities:  activity as tolerated    Nursing:   Agency to admit patient within 24 hours of hospital discharge unless specified on physician order or at patient request    SN to complete comprehensive assessment including routine vital signs. Instruct on disease process and s/s of complications to report to MD. Review/verify medication list sent home with the patient at time of discharge  and instruct patient/caregiver as needed. Frequency may be adjusted depending on start of care date.     Skilled nurse to perform up to 3 visits PRN for symptoms related to diagnosis    Notify MD if SBP > 160 or < 90; DBP > 90 or < 50; HR > 120 or < 50; Temp > 101; O2 < 88%    Ok to schedule additional visits based on staff availability and patient request on consecutive days within the home health episode.    When multiple disciplines ordered:    Start of Care occurs on Sunday - Wednesday schedule remaining discipline evaluations as ordered on separate consecutive days following the start of  care.    Thursday SOC -schedule subsequent evaluations Friday and Monday the following week.     Friday - Saturday SOC - schedule subsequent discipline evaluations on consecutive days starting Monday of the following week.      Miscellaneous   Routine Skin for Bedridden Patients: Instruct patient/caregiver to apply moisture barrier cream to all skin folds and wet areas in perineal area daily and after baths and all bowel movements.  Diabetic Care:   Report CBG < 60 or > 350 to physician.  Heart Failure:      SN to instruct on the following:    Instruct on the definition of CHF.   Instruct on the signs/sympoms of CHF to be reported.   Instruct on and monitor daily weights.   Instruct on factors that cause exacerbation.   Instruct on action, dose, schedule, and side effects of medications.   Instruct on diet as prescribed.   Instruct on activity allowed.   Instruct on life-style modifications for life long management of CHF   SN to assess compliance with daily weights, diet, medications, fluid retention,    safety precautions, activities permitted and life-style modifications.   Additional 1-2 SN visits per week as needed for signs and symptoms     of CHF exacerbation.      For Weight Gain > 2-3 lbs in 1 day or 4-6 lbs over 1 week notify PCP:    Home Health Aide:  Physical Therapy Other: evaluate and treat  and Occupational Therapy Other: evaluate and treat    Wound Care Orders  no    I certify that this patient is confined to her home and needs physical therapy and occupational therapy.

## 2023-02-23 NOTE — PLAN OF CARE
Problem: Occupational Therapy  Goal: Occupational Therapy Goal  Description: Goals to be met by: 2/26/2023     Patient will increase functional independence with ADLs by performing:    Grooming while EOB with Supervision and Set Up Assistance.  Sitting at edge of bed x15 minutes with Supervision.  Rolling to Bilateral with Moderate Assistance.   Supine to sit with Maximum Assistance x1.    2/23/2023 0951 by BRYANT Cornell  Outcome: Ongoing, Not Progressing

## 2023-02-23 NOTE — DISCHARGE SUMMARY
"[facility] Surg [address] Medicine  Discharge Summary      Patient Name: Dania Her  MRN: [id number]  FAITH: [id number]  Patient Class: IP- Inpatient  Admission Date: 2/11/2023  Hospital Length of Stay: 8 days  Discharge Date and Time: 2/23/2023 12:45 PM  Attending Physician: Jenn att. providers found   Discharging Provider: Franca Reyes PA-C  Primary Care Provider: Primary Doctor Jenn    Primary Care Team: Networked reference to record PCT     HPI:   Per admitting provider:  "The patient is a 81 yo female with a past medical history of atrial fibrillation, HTN, IDDM, and pulmonary hypertension who presents for evaluation of increasing frequency of her standing intermittent chest pain over the past few to many days. She states the chest pain sometimes occurs with exertion and other times has no inciting factors. She endorses associated dyspnea with these episodes and reports worsening leg swelling. She also notes productive cough with clear mucus "when I have hot soup or pepper." She states she has had episodes of diaphoresis "and I get hot hot hot." She attributes this to her breast cancer related medication, denies any acute change, or relation to her episodes of chest pain.  Denies recent fever, chills, pain elsewhere, and other somatic complaints.  The patient has a mildly elevated troponin to .028, d-dimer 1.42.  She will be admitted for further management of her elevated troponin and Cardiology consult."    History clarified with the patient, she reports she is been slowly becoming more debilitated and for the last 2 months she is been unable to walk.  Therefore, she turned the living room into her bedroom as she is unable to go up the stairs.  She lives at home with her 83-year-old  who also has multiple medical issues.  He is unable to provide proper care for her and her children her only able to stop by every now and then.  She has a sitter who comes only 3 days per week.  The " rest of the time, she has no one to change her or turn her.  She said it has become increasingly clear to her that she may need to move into a nursing facility.  The reason she came to the hospital yesterday because her daughter decided to have a birthday party in the living room which is her bedroom.  She was upset about all the people invading her personal space and because she got upset all of her chronic aches and pains were exacerbated.  The chest pain she reported is the same pain she has had on and off since her mastectomy, she also reported back pain and knee pain.  She denied any associated nausea, jaw pain, arm pain, diaphoresis, or shortness of breath.      * No surgery found *      Hospital Course:   Ms. Her was placed in observation 2/11 due to exacerbations of her chronic chest, back, and knee pain brought on by emotional distress at not being well cared for at home. Troponin trend 0.028 >> 0.046 >> 0.028; EKG with NSR and TWA/inferolateral changes that are seen on prior studies per Care Everywhere. D-dimer 1.42 with negative CTA chest. Intermittently with nonsustained SVT and also pauses, caridology consulted: diltiazem discontinued, discussed with patient's cardiologist outpatient, PPM placement to be considered in the future.Palliative Care consulted, appreciate assistance. PT/OT following and recommending SNF.     Disposition plans: CM/SW following with plans for group home placement since SNF was denied by insurance.  However family eventually decided to take patient home with [facility]. Stable for dc with PCP fu, return precautions discussed, no further questions at dc. Cardiology and PCP follow up advised at [place]; follows with Cardiology at [facility].        Goals of Care Treatment Preferences:  Code Status: Full Code          What is most important right now is to focus on remaining as independent as possible, improvement in condition but with limits to invasive therapies.  Accordingly, we have decided  that the best plan to meet the patient's goals includes continuing with treatment.      Consults:   Consults (From admission, onward)        Status Ordering Provider     Inpatient consult to Social Work  Once        Provider:  (Not yet assigned)    Acknowledged ERASMO BARNES     Inpatient consult to Cardiology  Once        Provider:  (Not yet assigned)    Completed ERASMO BARNES     Inpatient consult to Social Work  Once        Provider:  (Not yet assigned)    Completed MARIA LUZ SOLORZANO     Inpatient consult to Palliative Care  Once        Provider:  (Not yet assigned)    Completed MENDEZ OSUNA     Inpatient consult to Social Work  Once        Provider:  (Not yet assigned)    Completed MENDEZ OSUNA          Cardiac/Vascular  Tachy-angeline syndrome   Multiple sinus pauses at least 4 seconds, as well as nonsustained SVT  -diltiazem stopped  -will likely need pacemaker.  Discussed with Dr. Block (EP at [facility])     Atrial fibrillation  History of SVT  History of DVT of lower extremity  Long term current use of anticoagulants  -chronic, controlled mostly  -remains HDS (bouts of brief, self limiting AFib/SVT on tele (chronic in nature per Care Everywhere)  -continue home diltiazem, flecainide, and apixaban  -dose/medication adjustment as appropriate   -continue tele monitoring  -EKG PRN concerns   -Cardiology and PCP follow up advised at [place]; follows with Cardiology at [facility]    Primary hypertension  -chronic, controlled  -continue home diltiazem, flecainide, furosemide, HCTZ, and lisinopril   -dose/medication adjustment as appropriate   -monitor     Elevated troponin  -placed in observation 2/11 due to exacerbations of her chronic chest, back, and knee pain brought on by emotional distress at not being well cared for at home  -unlikely ACS  -troponin trend 0.028 >> 0.046 >> 0.028  -EKG with NSR and TWA/inferolateral changes that are seen on prior studies per Care Everywhere  -D-dimer 1.42 with  negative CTA chest  -remains HDS (bouts of brief, self limiting AFib/SVT on tele (chronic in nature per Care Everywhere)  -continue home diltiazem, flecainide, furosemide, HCTZ, and lisinopril   -dose/medication adjustment as appropriate   -EKG PRN concerns  -monitor   -Cardiology and PCP follow up advised at [place]; follows with Cardiology at [facility]    Renal/  Hypercalcemia  Calcium ranging 10-11  Will check ionized calcium and pth: PTH high, ionized ca WNL; fu with pcp  Medications reviewed  Giving IVF bolus 2/22 as she looks dry on exam >> Ca 10.9     Pyuria  UCx showing ESBL E. Coli  Patient is afebrile, asymptomatic  Will not treat at this time      Oncology  Breast cancer  -chronic, s/p mastectomy with chronic chest wall pain  -continue home anastrozole    Endocrine  Type 2 diabetes mellitus, with long-term current use of insulin  -chronic, not well controlled at home  -HgA1C 8.1  -hold home glyburide  -LDSSI initiated  -dose/medication adjustment as appropriate   -monitor accuchecks AC/HS and PRN hypoglycemic protocol     Other  * Debility  Decreased functional mobility and endurance  Chronic chest wall pain  Cancer related pain  Bilateral primary osteoarthritis of knee  Chronic pain of both shoulders  -placed in observation 2/11 due to exacerbations of her chronic chest, back, and knee pain brought on by emotional distress at not being well cared for at home  -reports ongoing chronic pain that improves with PRN supportive care  -Palliative Care consulted, appreciate assistance  -PT/OT following: rec SNF  -PRN supportive care   -dose/medication adjustment as appropriate   -fall precautions  -monitor   -CM/SW following with plans for [facility] given snf was denied; ultimately family decided on discharge with [facility]       Final Active Diagnoses:    Diagnosis Date Noted POA    PRINCIPAL PROBLEM:  Debility [R53.81] 02/12/2023 Yes    Hypercalcemia [E83.52] 02/22/2023 Yes    Tachy-angeline syndrome [I49.5] 02/15/2023  Yes    Pyuria [R82.81] 02/14/2023 Yes    Chronic chest wall pain [R07.89, G89.29] 02/13/2023 Yes    Chronic pain of both shoulders [M25.511, G89.29, M25.512] 02/13/2023 Yes    Decreased functional mobility and endurance [Z74.09] 02/13/2023 Yes    History of supraventricular tachycardia [Z86.79] 02/13/2023 Not Applicable    Elevated troponin [R77.8] 02/12/2023 Yes    Primary hypertension [I10] 02/12/2023 Yes    Type 2 diabetes mellitus, with long-term current use of insulin [E11.9, Z79.4] 02/12/2023 Not Applicable    Atrial fibrillation [I48.91] 02/12/2023 Yes    Breast cancer [C50.919] 02/12/2023 Yes    Cancer related pain [G89.3] 11/03/2021 Yes    Long term current use of anticoagulant [Z79.01] 05/25/2020 Not Applicable    History of DVT of lower extremity [Z86.718] 07/08/2018 Not Applicable    Bilateral primary osteoarthritis of knee [M17.0] 08/25/2015 Yes      Problems Resolved During this Admission:       Discharged Condition: stable    Disposition: Home-Health Care [facility]    Follow Up:    Patient Instructions:      Ambulatory referral/consult to [facility]   Standing Status: Future   Referral Priority: Routine Referral Type: Consultation   Referral Reason: Specialty Services Required   Number of Visits Requested: 1       Significant Diagnostic Studies: Labs:   BMP:   Recent Labs   Lab 02/22/23  0912 02/23/23  0852   * 168*   * 137   K 4.1 4.1   CL 95 98   CO2 35* 33*   BUN 12 9   CREATININE 0.7 0.7   CALCIUM 11.1* 10.9*       Pending Diagnostic Studies:     None         Medications:  Reconciled Home Medications:      Medication List      CONTINUE taking these medications    anastrozole 1 mg Tab  Commonly known as: ARIMIDEX  Take 1 mg by mouth once daily.     cholecalciferol (vitamin D3) 1,250 mcg (50,000 unit) capsule  Take 50,000 Units by mouth every 30 days.     diphenhydrAMINE 25 mg capsule  Commonly known as: BENADRYL  Take 25 mg by mouth nightly as  needed for Itching or Insomnia.     ELIQUIS 5 mg Tab  Generic drug: apixaban  Take 5 mg by mouth 2 (two) times daily.     EScitalopram oxalate 10 MG tablet  Commonly known as: LEXAPRO  Take 10 mg by mouth once daily.     flecainide 100 MG Tab  Commonly known as: TAMBOCOR  Take 100 mg by mouth every 12 (twelve) hours.     furosemide 20 MG tablet  Commonly known as: LASIX  Take 20 mg by mouth 2 (two) times daily.     glyBURIDE 5 MG tablet  Commonly known as: DIABETA  Take 5 mg by mouth every morning.     HYDROcodone-acetaminophen 5-325 mg per tablet  Commonly known as: NORCO  Take 1 tablet by mouth 2 (two) times daily as needed for Pain.     nystatin powder  Commonly known as: MYCOSTATIN  Apply 1 spray topically 2 (two) times a day.     pantoprazole 20 MG tablet  Commonly known as: PROTONIX  Take 20 mg by mouth daily as needed for Heartburn.     polyethylene glycol 17 gram/dose powder  Commonly known as: GLYCOLAX  Take 17 g by mouth daily as needed.     ramipriL 2.5 MG capsule  Commonly known as: ALTACE  Take 2.5 mg by mouth once daily.     senna-docusate 8.6-50 mg 8.6-50 mg per tablet  Commonly known as: PERICOLACE  Take 1 tablet by mouth once daily.     tiZANidine 2 MG tablet  Commonly known as: ZANAFLEX  Take 2 mg by mouth 2 (two) times daily as needed for Muscle spasms.     traMADoL 50 mg tablet  Commonly known as: ULTRAM  Take 50 mg by mouth every 6 (six) hours as needed for Pain.     traZODone 50 MG tablet  Commonly known as: DESYREL  Take 50 mg by mouth every evening.        STOP taking these medications    diltiaZEM 120 MG Cp24  Commonly known as: CARDIZEM CD     hydroCHLOROthiazide 25 MG tablet  Commonly known as: HYDRODIURIL            Indwelling Lines/Drains at time of discharge:   Lines/Drains/Airways     None                 Time spent on the discharge of patient: >45 minutes         Franca Reyes PA-C  Department of Hospital Medicine  [facility] Surg ([address])

## 2023-02-23 NOTE — ASSESSMENT & PLAN NOTE
History of SVT  History of DVT of lower extremity  Long term current use of anticoagulants  -chronic, controlled mostly  -remains HDS (bouts of brief, self limiting AFib/SVT on tele (chronic in nature per Care Everywhere)  -continue home diltiazem, flecainide, and apixaban  -dose/medication adjustment as appropriate   -continue tele monitoring  -EKG PRN concerns   -Cardiology and PCP follow up advised at [place]; follows with Cardiology at [facility]

## 2023-02-23 NOTE — CARE UPDATE
Dania Her requires stretcher for transportation given her lack of mobility.      Franca Reyes PA-C

## 2023-02-23 NOTE — PLAN OF CARE
Received voicemail from patient daughter Barbie inquiring about status of her mother's admission to [facility].    This  Clinic  left message for Rebeca at [facility] [phone] to return call regarding status of admission; awaiting response    Received response from Rebeca; referral still under review for SNF as their DON was not aware of SNF placement pursuit.  Rebeca informed that SNF has been denied per Dr. Matthews/ERO review due to lack of medically necessity.  As per Gregory, daughter  Barbie has not provided financial statements for admission.    0943-Spoke with patient's daughter Barbie. Informed of delay with admission process. As per Mark, family/spouse no longer wants to pursue snf placement, want to take patient home at this time and continue with resumption of care with [facility].  Patient will need transportation home via stretcher.  Updated DOLLY Schulte and JOSE J Thomas.

## 2023-02-24 ENCOUNTER — PATIENT OUTREACH (OUTPATIENT)
Dept: ADMINISTRATIVE | Facility: CLINIC | Age: 83
End: 2023-02-24
Payer: MEDICARE

## 2023-02-28 ENCOUNTER — PES CALL (OUTPATIENT)
Dept: ADMINISTRATIVE | Facility: CLINIC | Age: 83
End: 2023-02-28
Payer: MEDICARE

## 2023-03-02 ENCOUNTER — PES CALL (OUTPATIENT)
Dept: ADMINISTRATIVE | Facility: CLINIC | Age: 83
End: 2023-03-02
Payer: MEDICARE

## 2023-03-03 ENCOUNTER — PES CALL (OUTPATIENT)
Dept: ADMINISTRATIVE | Facility: CLINIC | Age: 83
End: 2023-03-03
Payer: MEDICARE

## 2023-06-06 PROCEDURE — G0180 PR HOME HEALTH MD CERTIFICATION: ICD-10-PCS | Mod: ,,, | Performed by: HOSPITALIST

## 2023-06-06 PROCEDURE — G0180 MD CERTIFICATION HHA PATIENT: HCPCS | Mod: ,,, | Performed by: HOSPITALIST

## 2023-06-27 ENCOUNTER — EXTERNAL HOME HEALTH (OUTPATIENT)
Dept: HOME HEALTH SERVICES | Facility: HOSPITAL | Age: 83
End: 2023-06-27
Payer: MEDICARE

## 2023-06-28 ENCOUNTER — DOCUMENT SCAN (OUTPATIENT)
Dept: HOME HEALTH SERVICES | Facility: HOSPITAL | Age: 83
End: 2023-06-28
Payer: MEDICARE

## 2023-11-02 PROBLEM — R41.82 ALTERED MENTAL STATUS: Status: ACTIVE | Noted: 2023-11-02

## 2023-11-02 PROBLEM — F03.90 DEMENTIA: Status: ACTIVE | Noted: 2023-11-02

## 2023-11-02 PROBLEM — N39.0 UTI (URINARY TRACT INFECTION): Status: ACTIVE | Noted: 2023-11-02

## 2023-11-06 PROBLEM — R41.82 ALTERED MENTAL STATUS: Status: RESOLVED | Noted: 2023-11-02 | Resolved: 2023-11-06

## 2023-11-06 PROBLEM — Z16.12 URINARY TRACT INFECTION DUE TO EXTENDED-SPECTRUM BETA LACTAMASE (ESBL) PRODUCING ESCHERICHIA COLI: Status: ACTIVE | Noted: 2023-11-02

## 2023-11-06 PROBLEM — B96.29 URINARY TRACT INFECTION DUE TO EXTENDED-SPECTRUM BETA LACTAMASE (ESBL) PRODUCING ESCHERICHIA COLI: Status: ACTIVE | Noted: 2023-11-02

## 2023-11-24 ENCOUNTER — HOSPITAL ENCOUNTER (OUTPATIENT)
Facility: HOSPITAL | Age: 83
Discharge: NURSING, REHAB OR LONG-TERM CARE | End: 2023-11-26
Attending: STUDENT IN AN ORGANIZED HEALTH CARE EDUCATION/TRAINING PROGRAM | Admitting: HOSPITALIST
Payer: MEDICARE

## 2023-11-24 DIAGNOSIS — R07.9 CHEST PAIN: ICD-10-CM

## 2023-11-24 DIAGNOSIS — R79.89 ELEVATED BRAIN NATRIURETIC PEPTIDE (BNP) LEVEL: ICD-10-CM

## 2023-11-24 DIAGNOSIS — M79.603 ARM PAIN: ICD-10-CM

## 2023-11-24 DIAGNOSIS — G93.41 ACUTE METABOLIC ENCEPHALOPATHY: ICD-10-CM

## 2023-11-24 DIAGNOSIS — I44.7 LBBB (LEFT BUNDLE BRANCH BLOCK): ICD-10-CM

## 2023-11-24 DIAGNOSIS — R41.82 AMS (ALTERED MENTAL STATUS): ICD-10-CM

## 2023-11-24 DIAGNOSIS — R79.89 TROPONIN LEVEL ELEVATED: ICD-10-CM

## 2023-11-24 DIAGNOSIS — R79.89 ELEVATED TROPONIN: Primary | ICD-10-CM

## 2023-11-24 PROBLEM — I50.9 ACUTE ON CHRONIC HEART FAILURE: Status: ACTIVE | Noted: 2023-11-24

## 2023-11-24 LAB
ALBUMIN SERPL BCP-MCNC: 3.3 G/DL (ref 3.5–5.2)
ALLENS TEST: ABNORMAL
ALP SERPL-CCNC: 118 U/L (ref 55–135)
ALT SERPL W/O P-5'-P-CCNC: 9 U/L (ref 10–44)
AMMONIA PLAS-SCNC: 30 UMOL/L (ref 10–50)
ANION GAP SERPL CALC-SCNC: 14 MMOL/L (ref 8–16)
APTT PPP: 29.9 SEC (ref 21–32)
ASCENDING AORTA: 3.48 CM
AST SERPL-CCNC: 16 U/L (ref 10–40)
AV MEAN GRADIENT: 2 MMHG
AV PEAK GRADIENT: 3 MMHG
BACTERIA #/AREA URNS AUTO: ABNORMAL /HPF
BASOPHILS # BLD AUTO: 0.02 K/UL (ref 0–0.2)
BASOPHILS NFR BLD: 0.6 % (ref 0–1.9)
BILIRUB SERPL-MCNC: 4.3 MG/DL (ref 0.1–1)
BILIRUB UR QL STRIP: NEGATIVE
BNP SERPL-MCNC: 586 PG/ML (ref 0–99)
BUN SERPL-MCNC: 10 MG/DL (ref 8–23)
CALCIUM SERPL-MCNC: 10.8 MG/DL (ref 8.7–10.5)
CHLORIDE SERPL-SCNC: 106 MMOL/L (ref 95–110)
CHOLEST SERPL-MCNC: 98 MG/DL (ref 120–199)
CHOLEST/HDLC SERPL: 3.9 {RATIO} (ref 2–5)
CLARITY UR REFRACT.AUTO: CLEAR
CO2 SERPL-SCNC: 23 MMOL/L (ref 23–29)
COLOR UR AUTO: YELLOW
CREAT SERPL-MCNC: 0.7 MG/DL (ref 0.5–1.4)
CV ECHO LV RWT: 0.59 CM
D DIMER PPP IA.FEU-MCNC: 5.42 MG/L FEU
DELSYS: ABNORMAL
DELSYS: ABNORMAL
DIFFERENTIAL METHOD: ABNORMAL
DOP CALC AO PEAK VEL: 0.81 M/S
DOP CALC AO VTI: 16.74 CM
DOP CALC LVOT AREA: 3.2 CM2
DOP CALC LVOT DIAMETER: 2.01 CM
E WAVE DECELERATION TIME: 211.48 MSEC
E/A RATIO: 2.35
E/E' RATIO: 8.13 M/S
ECHO LV POSTERIOR WALL: 1.02 CM (ref 0.6–1.1)
EOSINOPHIL # BLD AUTO: 0 K/UL (ref 0–0.5)
EOSINOPHIL NFR BLD: 1.1 % (ref 0–8)
ERYTHROCYTE [DISTWIDTH] IN BLOOD BY AUTOMATED COUNT: 18.1 % (ref 11.5–14.5)
EST. GFR  (NO RACE VARIABLE): >60 ML/MIN/1.73 M^2
FLOW: 2
FOLATE SERPL-MCNC: 13.8 NG/ML (ref 4–24)
FRACTIONAL SHORTENING: 32 % (ref 28–44)
GLUCOSE SERPL-MCNC: 119 MG/DL (ref 70–110)
GLUCOSE UR QL STRIP: ABNORMAL
HCO3 UR-SCNC: 33.5 MMOL/L (ref 24–28)
HCO3 UR-SCNC: 35.2 MMOL/L (ref 24–28)
HCO3 UR-SCNC: 35.3 MMOL/L (ref 24–28)
HCT VFR BLD AUTO: 46.5 % (ref 37–48.5)
HCT VFR BLD CALC: 40 %PCV (ref 36–54)
HCT VFR BLD CALC: 43 %PCV (ref 36–54)
HCV AB SERPL QL IA: NORMAL
HDLC SERPL-MCNC: 25 MG/DL (ref 40–75)
HDLC SERPL: 25.5 % (ref 20–50)
HGB BLD-MCNC: 15.1 G/DL (ref 12–16)
HGB UR QL STRIP: ABNORMAL
HIV 1+2 AB+HIV1 P24 AG SERPL QL IA: NORMAL
IMM GRANULOCYTES # BLD AUTO: 0.03 K/UL (ref 0–0.04)
IMM GRANULOCYTES NFR BLD AUTO: 0.8 % (ref 0–0.5)
INR PPP: 1.3 (ref 0.8–1.2)
INTERVENTRICULAR SEPTUM: 0.53 CM (ref 0.6–1.1)
KETONES UR QL STRIP: NEGATIVE
LA MAJOR: 6.26 CM
LA MINOR: 6.8 CM
LA WIDTH: 3.38 CM
LACTATE SERPL-SCNC: 1.5 MMOL/L (ref 0.5–2.2)
LDLC SERPL CALC-MCNC: 58.6 MG/DL (ref 63–159)
LEFT ATRIUM SIZE: 4.38 CM
LEFT ATRIUM VOLUME: 82.03 CM3
LEFT INTERNAL DIMENSION IN SYSTOLE: 2.35 CM (ref 2.1–4)
LEFT VENTRICLE DIASTOLIC VOLUME: 49.62 ML
LEFT VENTRICLE SYSTOLIC VOLUME: 19.02 ML
LEFT VENTRICULAR INTERNAL DIMENSION IN DIASTOLE: 3.46 CM (ref 3.5–6)
LEFT VENTRICULAR MASS: 70.76 G
LEUKOCYTE ESTERASE UR QL STRIP: ABNORMAL
LIPASE SERPL-CCNC: 18 U/L (ref 4–60)
LV LATERAL E/E' RATIO: 6.78 M/S
LV SEPTAL E/E' RATIO: 10.17 M/S
LYMPHOCYTES # BLD AUTO: 1.1 K/UL (ref 1–4.8)
LYMPHOCYTES NFR BLD: 30.3 % (ref 18–48)
MAGNESIUM SERPL-MCNC: 1.9 MG/DL (ref 1.6–2.6)
MCH RBC QN AUTO: 34.3 PG (ref 27–31)
MCHC RBC AUTO-ENTMCNC: 32.5 G/DL (ref 32–36)
MCV RBC AUTO: 106 FL (ref 82–98)
MICROSCOPIC COMMENT: ABNORMAL
MODE: ABNORMAL
MODE: ABNORMAL
MONOCYTES # BLD AUTO: 0.2 K/UL (ref 0.3–1)
MONOCYTES NFR BLD: 6.8 % (ref 4–15)
MV PEAK A VEL: 0.26 M/S
MV PEAK E VEL: 0.61 M/S
MV STENOSIS PRESSURE HALF TIME: 61.33 MS
MV VALVE AREA P 1/2 METHOD: 3.59 CM2
NEUTROPHILS # BLD AUTO: 2.1 K/UL (ref 1.8–7.7)
NEUTROPHILS NFR BLD: 60.4 % (ref 38–73)
NITRITE UR QL STRIP: NEGATIVE
NONHDLC SERPL-MCNC: 73 MG/DL
NRBC BLD-RTO: 1 /100 WBC
PCO2 BLDA: 52.8 MMHG (ref 35–45)
PCO2 BLDA: 56.9 MMHG (ref 35–45)
PCO2 BLDA: 68.3 MMHG (ref 35–45)
PH SMN: 7.32 [PH] (ref 7.35–7.45)
PH SMN: 7.38 [PH] (ref 7.35–7.45)
PH SMN: 7.43 [PH] (ref 7.35–7.45)
PH UR STRIP: 5 [PH] (ref 5–8)
PLATELET # BLD AUTO: 45 K/UL (ref 150–450)
PMV BLD AUTO: 12.6 FL (ref 9.2–12.9)
PO2 BLDA: 42 MMHG (ref 40–60)
PO2 BLDA: 59 MMHG (ref 80–100)
PO2 BLDA: 61 MMHG (ref 40–60)
POC BE: 11 MMOL/L
POC BE: 8 MMOL/L
POC BE: 9 MMOL/L
POC SATURATED O2: 78 % (ref 95–100)
POC SATURATED O2: 88 % (ref 95–100)
POC SATURATED O2: 89 % (ref 95–100)
POC TCO2: 35 MMOL/L (ref 23–27)
POC TCO2: 37 MMOL/L (ref 24–29)
POC TCO2: 37 MMOL/L (ref 24–29)
POCT GLUCOSE: 117 MG/DL (ref 70–110)
POCT GLUCOSE: 160 MG/DL (ref 70–110)
POCT GLUCOSE: 47 MG/DL (ref 70–110)
POCT GLUCOSE: 83 MG/DL (ref 70–110)
POCT GLUCOSE: 86 MG/DL (ref 70–110)
POCT GLUCOSE: 89 MG/DL (ref 70–110)
POCT GLUCOSE: 92 MG/DL (ref 70–110)
POTASSIUM SERPL-SCNC: 4 MMOL/L (ref 3.5–5.1)
PROT SERPL-MCNC: 8 G/DL (ref 6–8.4)
PROT UR QL STRIP: NEGATIVE
PROTHROMBIN TIME: 13.5 SEC (ref 9–12.5)
RA MAJOR: 6.16 CM
RA PRESSURE ESTIMATED: 15 MMHG
RA WIDTH: 3.96 CM
RBC # BLD AUTO: 4.4 M/UL (ref 4–5.4)
RBC #/AREA URNS AUTO: 2 /HPF (ref 0–4)
RIGHT VENTRICULAR END-DIASTOLIC DIMENSION: 3.12 CM
RPR SER QL: NORMAL
SAMPLE: ABNORMAL
SINUS: 2.95 CM
SITE: ABNORMAL
SODIUM SERPL-SCNC: 143 MMOL/L (ref 136–145)
SP GR UR STRIP: 1.01 (ref 1–1.03)
SQUAMOUS #/AREA URNS AUTO: 1 /HPF
STJ: 2.9 CM
TDI LATERAL: 0.09 M/S
TDI SEPTAL: 0.06 M/S
TDI: 0.08 M/S
TRIGL SERPL-MCNC: 72 MG/DL (ref 30–150)
TROPONIN I SERPL DL<=0.01 NG/ML-MCNC: 0.08 NG/ML (ref 0–0.03)
TROPONIN I SERPL DL<=0.01 NG/ML-MCNC: 0.09 NG/ML (ref 0–0.03)
TSH SERPL DL<=0.005 MIU/L-ACNC: 1.42 UIU/ML (ref 0.4–4)
URN SPEC COLLECT METH UR: ABNORMAL
VIT B12 SERPL-MCNC: 1671 PG/ML (ref 210–950)
WBC # BLD AUTO: 3.53 K/UL (ref 3.9–12.7)
WBC #/AREA URNS AUTO: 17 /HPF (ref 0–5)
YEAST UR QL AUTO: ABNORMAL

## 2023-11-24 PROCEDURE — 93005 ELECTROCARDIOGRAM TRACING: CPT

## 2023-11-24 PROCEDURE — 25000003 PHARM REV CODE 250: Performed by: STUDENT IN AN ORGANIZED HEALTH CARE EDUCATION/TRAINING PROGRAM

## 2023-11-24 PROCEDURE — 87389 HIV-1 AG W/HIV-1&-2 AB AG IA: CPT | Performed by: PHYSICIAN ASSISTANT

## 2023-11-24 PROCEDURE — 25500020 PHARM REV CODE 255: Performed by: HOSPITALIST

## 2023-11-24 PROCEDURE — 82746 ASSAY OF FOLIC ACID SERUM: CPT

## 2023-11-24 PROCEDURE — 83880 ASSAY OF NATRIURETIC PEPTIDE: CPT | Performed by: STUDENT IN AN ORGANIZED HEALTH CARE EDUCATION/TRAINING PROGRAM

## 2023-11-24 PROCEDURE — 96375 TX/PRO/DX INJ NEW DRUG ADDON: CPT | Mod: 59

## 2023-11-24 PROCEDURE — 85610 PROTHROMBIN TIME: CPT | Performed by: STUDENT IN AN ORGANIZED HEALTH CARE EDUCATION/TRAINING PROGRAM

## 2023-11-24 PROCEDURE — 87040 BLOOD CULTURE FOR BACTERIA: CPT | Mod: 59

## 2023-11-24 PROCEDURE — 96372 THER/PROPH/DIAG INJ SC/IM: CPT

## 2023-11-24 PROCEDURE — 85379 FIBRIN DEGRADATION QUANT: CPT

## 2023-11-24 PROCEDURE — 86803 HEPATITIS C AB TEST: CPT | Performed by: PHYSICIAN ASSISTANT

## 2023-11-24 PROCEDURE — 82803 BLOOD GASES ANY COMBINATION: CPT

## 2023-11-24 PROCEDURE — 25000003 PHARM REV CODE 250

## 2023-11-24 PROCEDURE — G0378 HOSPITAL OBSERVATION PER HR: HCPCS

## 2023-11-24 PROCEDURE — 85730 THROMBOPLASTIN TIME PARTIAL: CPT | Performed by: STUDENT IN AN ORGANIZED HEALTH CARE EDUCATION/TRAINING PROGRAM

## 2023-11-24 PROCEDURE — 80061 LIPID PANEL: CPT

## 2023-11-24 PROCEDURE — 96376 TX/PRO/DX INJ SAME DRUG ADON: CPT

## 2023-11-24 PROCEDURE — 87086 URINE CULTURE/COLONY COUNT: CPT | Performed by: STUDENT IN AN ORGANIZED HEALTH CARE EDUCATION/TRAINING PROGRAM

## 2023-11-24 PROCEDURE — 81001 URINALYSIS AUTO W/SCOPE: CPT | Performed by: STUDENT IN AN ORGANIZED HEALTH CARE EDUCATION/TRAINING PROGRAM

## 2023-11-24 PROCEDURE — 83735 ASSAY OF MAGNESIUM: CPT | Performed by: STUDENT IN AN ORGANIZED HEALTH CARE EDUCATION/TRAINING PROGRAM

## 2023-11-24 PROCEDURE — 82140 ASSAY OF AMMONIA: CPT | Performed by: HOSPITALIST

## 2023-11-24 PROCEDURE — 25000003 PHARM REV CODE 250: Performed by: HOSPITALIST

## 2023-11-24 PROCEDURE — 99900035 HC TECH TIME PER 15 MIN (STAT)

## 2023-11-24 PROCEDURE — 96365 THER/PROPH/DIAG IV INF INIT: CPT | Mod: 59

## 2023-11-24 PROCEDURE — 93010 ELECTROCARDIOGRAM REPORT: CPT | Mod: 76,,, | Performed by: INTERNAL MEDICINE

## 2023-11-24 PROCEDURE — 63600175 PHARM REV CODE 636 W HCPCS: Performed by: STUDENT IN AN ORGANIZED HEALTH CARE EDUCATION/TRAINING PROGRAM

## 2023-11-24 PROCEDURE — 94761 N-INVAS EAR/PLS OXIMETRY MLT: CPT

## 2023-11-24 PROCEDURE — 99285 EMERGENCY DEPT VISIT HI MDM: CPT | Mod: 25

## 2023-11-24 PROCEDURE — 63600175 PHARM REV CODE 636 W HCPCS

## 2023-11-24 PROCEDURE — 84484 ASSAY OF TROPONIN QUANT: CPT | Mod: 91

## 2023-11-24 PROCEDURE — 92610 EVALUATE SWALLOWING FUNCTION: CPT

## 2023-11-24 PROCEDURE — 86592 SYPHILIS TEST NON-TREP QUAL: CPT

## 2023-11-24 PROCEDURE — 80053 COMPREHEN METABOLIC PANEL: CPT | Performed by: STUDENT IN AN ORGANIZED HEALTH CARE EDUCATION/TRAINING PROGRAM

## 2023-11-24 PROCEDURE — 83690 ASSAY OF LIPASE: CPT | Performed by: STUDENT IN AN ORGANIZED HEALTH CARE EDUCATION/TRAINING PROGRAM

## 2023-11-24 PROCEDURE — 84443 ASSAY THYROID STIM HORMONE: CPT | Performed by: STUDENT IN AN ORGANIZED HEALTH CARE EDUCATION/TRAINING PROGRAM

## 2023-11-24 PROCEDURE — 93010 EKG 12-LEAD: ICD-10-PCS | Mod: 76,,, | Performed by: INTERNAL MEDICINE

## 2023-11-24 PROCEDURE — 83605 ASSAY OF LACTIC ACID: CPT | Performed by: STUDENT IN AN ORGANIZED HEALTH CARE EDUCATION/TRAINING PROGRAM

## 2023-11-24 PROCEDURE — 85025 COMPLETE CBC W/AUTO DIFF WBC: CPT | Performed by: STUDENT IN AN ORGANIZED HEALTH CARE EDUCATION/TRAINING PROGRAM

## 2023-11-24 PROCEDURE — 84484 ASSAY OF TROPONIN QUANT: CPT | Performed by: STUDENT IN AN ORGANIZED HEALTH CARE EDUCATION/TRAINING PROGRAM

## 2023-11-24 PROCEDURE — 27000221 HC OXYGEN, UP TO 24 HOURS

## 2023-11-24 PROCEDURE — 82607 VITAMIN B-12: CPT

## 2023-11-24 PROCEDURE — 93010 ELECTROCARDIOGRAM REPORT: CPT | Mod: ,,, | Performed by: INTERNAL MEDICINE

## 2023-11-24 PROCEDURE — 82962 GLUCOSE BLOOD TEST: CPT

## 2023-11-24 RX ORDER — AMOXICILLIN 250 MG
1 CAPSULE ORAL DAILY
Status: DISCONTINUED | OUTPATIENT
Start: 2023-11-24 | End: 2023-11-26 | Stop reason: HOSPADM

## 2023-11-24 RX ORDER — FUROSEMIDE 10 MG/ML
40 INJECTION INTRAMUSCULAR; INTRAVENOUS 2 TIMES DAILY
Status: DISCONTINUED | OUTPATIENT
Start: 2023-11-24 | End: 2023-11-24

## 2023-11-24 RX ORDER — ACETAMINOPHEN 325 MG/1
650 TABLET ORAL EVERY 4 HOURS PRN
Status: DISCONTINUED | OUTPATIENT
Start: 2023-11-24 | End: 2023-11-26 | Stop reason: HOSPADM

## 2023-11-24 RX ORDER — NALOXONE HCL 0.4 MG/ML
0.02 VIAL (ML) INJECTION
Status: DISCONTINUED | OUTPATIENT
Start: 2023-11-24 | End: 2023-11-26 | Stop reason: HOSPADM

## 2023-11-24 RX ORDER — ACETAMINOPHEN 500 MG
1000 TABLET ORAL EVERY 8 HOURS PRN
Status: DISCONTINUED | OUTPATIENT
Start: 2023-11-24 | End: 2023-11-26 | Stop reason: HOSPADM

## 2023-11-24 RX ORDER — QUETIAPINE FUMARATE 25 MG/1
25 TABLET, FILM COATED ORAL NIGHTLY
Status: DISCONTINUED | OUTPATIENT
Start: 2023-11-24 | End: 2023-11-24

## 2023-11-24 RX ORDER — TRAMADOL HYDROCHLORIDE 50 MG/1
50 TABLET ORAL EVERY 6 HOURS PRN
COMMUNITY

## 2023-11-24 RX ORDER — SPIRONOLACTONE 25 MG/1
25 TABLET ORAL DAILY
Status: DISCONTINUED | OUTPATIENT
Start: 2023-11-24 | End: 2023-11-26 | Stop reason: HOSPADM

## 2023-11-24 RX ORDER — SODIUM CHLORIDE 0.9 % (FLUSH) 0.9 %
10 SYRINGE (ML) INJECTION
Status: DISCONTINUED | OUTPATIENT
Start: 2023-11-24 | End: 2023-11-26 | Stop reason: HOSPADM

## 2023-11-24 RX ORDER — POLYETHYLENE GLYCOL 3350 17 G/17G
17 POWDER, FOR SOLUTION ORAL 2 TIMES DAILY PRN
Status: DISCONTINUED | OUTPATIENT
Start: 2023-11-24 | End: 2023-11-26 | Stop reason: HOSPADM

## 2023-11-24 RX ORDER — TALC
6 POWDER (GRAM) TOPICAL NIGHTLY
Status: DISCONTINUED | OUTPATIENT
Start: 2023-11-24 | End: 2023-11-26 | Stop reason: HOSPADM

## 2023-11-24 RX ORDER — ASPIRIN 325 MG
325 TABLET ORAL ONCE
Status: COMPLETED | OUTPATIENT
Start: 2023-11-24 | End: 2023-11-24

## 2023-11-24 RX ORDER — ESCITALOPRAM OXALATE 10 MG/1
10 TABLET ORAL DAILY
Status: DISCONTINUED | OUTPATIENT
Start: 2023-11-24 | End: 2023-11-24

## 2023-11-24 RX ORDER — ANASTROZOLE 1 MG/1
1 TABLET ORAL DAILY
Status: DISCONTINUED | OUTPATIENT
Start: 2023-11-24 | End: 2023-11-26 | Stop reason: HOSPADM

## 2023-11-24 RX ORDER — FUROSEMIDE 10 MG/ML
40 INJECTION INTRAMUSCULAR; INTRAVENOUS
Status: COMPLETED | OUTPATIENT
Start: 2023-11-24 | End: 2023-11-24

## 2023-11-24 RX ORDER — PROCHLORPERAZINE EDISYLATE 5 MG/ML
5 INJECTION INTRAMUSCULAR; INTRAVENOUS EVERY 6 HOURS PRN
Status: DISCONTINUED | OUTPATIENT
Start: 2023-11-24 | End: 2023-11-26 | Stop reason: HOSPADM

## 2023-11-24 RX ORDER — NITROFURANTOIN 25; 75 MG/1; MG/1
100 CAPSULE ORAL 2 TIMES DAILY
COMMUNITY

## 2023-11-24 RX ORDER — GLUCAGON 1 MG
1 KIT INJECTION
Status: DISCONTINUED | OUTPATIENT
Start: 2023-11-24 | End: 2023-11-26 | Stop reason: HOSPADM

## 2023-11-24 RX ORDER — MAG HYDROX/ALUMINUM HYD/SIMETH 200-200-20
30 SUSPENSION, ORAL (FINAL DOSE FORM) ORAL 4 TIMES DAILY PRN
Status: DISCONTINUED | OUTPATIENT
Start: 2023-11-24 | End: 2023-11-26 | Stop reason: HOSPADM

## 2023-11-24 RX ORDER — TRAMADOL HYDROCHLORIDE 50 MG/1
50 TABLET ORAL EVERY 6 HOURS PRN
Status: DISCONTINUED | OUTPATIENT
Start: 2023-11-24 | End: 2023-11-24

## 2023-11-24 RX ORDER — IBUPROFEN 200 MG
16 TABLET ORAL
Status: DISCONTINUED | OUTPATIENT
Start: 2023-11-24 | End: 2023-11-26 | Stop reason: HOSPADM

## 2023-11-24 RX ORDER — SODIUM CHLORIDE 0.9 % (FLUSH) 0.9 %
5 SYRINGE (ML) INJECTION
Status: DISCONTINUED | OUTPATIENT
Start: 2023-11-24 | End: 2023-11-26 | Stop reason: HOSPADM

## 2023-11-24 RX ORDER — DONEPEZIL HYDROCHLORIDE 5 MG/1
5 TABLET, FILM COATED ORAL NIGHTLY
Status: DISCONTINUED | OUTPATIENT
Start: 2023-11-24 | End: 2023-11-26 | Stop reason: HOSPADM

## 2023-11-24 RX ORDER — ONDANSETRON 8 MG/1
8 TABLET, ORALLY DISINTEGRATING ORAL EVERY 8 HOURS PRN
Status: DISCONTINUED | OUTPATIENT
Start: 2023-11-24 | End: 2023-11-26 | Stop reason: HOSPADM

## 2023-11-24 RX ORDER — METOPROLOL TARTRATE 1 MG/ML
2.5 INJECTION, SOLUTION INTRAVENOUS EVERY 8 HOURS
Status: DISCONTINUED | OUTPATIENT
Start: 2023-11-24 | End: 2023-11-25

## 2023-11-24 RX ORDER — TRAZODONE HYDROCHLORIDE 50 MG/1
50 TABLET ORAL NIGHTLY
COMMUNITY

## 2023-11-24 RX ORDER — ENOXAPARIN SODIUM 150 MG/ML
1 INJECTION SUBCUTANEOUS EVERY 12 HOURS
Status: DISCONTINUED | OUTPATIENT
Start: 2023-11-24 | End: 2023-11-25

## 2023-11-24 RX ORDER — METOPROLOL SUCCINATE 50 MG/1
200 TABLET, EXTENDED RELEASE ORAL DAILY
Status: DISCONTINUED | OUTPATIENT
Start: 2023-11-24 | End: 2023-11-24

## 2023-11-24 RX ORDER — QUETIAPINE FUMARATE 25 MG/1
25 TABLET, FILM COATED ORAL
Status: COMPLETED | OUTPATIENT
Start: 2023-11-24 | End: 2023-11-24

## 2023-11-24 RX ORDER — TALC
6 POWDER (GRAM) TOPICAL NIGHTLY PRN
Status: DISCONTINUED | OUTPATIENT
Start: 2023-11-24 | End: 2023-11-24

## 2023-11-24 RX ORDER — BISACODYL 10 MG
10 SUPPOSITORY, RECTAL RECTAL DAILY PRN
Status: DISCONTINUED | OUTPATIENT
Start: 2023-11-24 | End: 2023-11-26 | Stop reason: HOSPADM

## 2023-11-24 RX ORDER — MICONAZOLE NITRATE 2 %
POWDER (GRAM) TOPICAL 2 TIMES DAILY
Status: DISCONTINUED | OUTPATIENT
Start: 2023-11-24 | End: 2023-11-26 | Stop reason: HOSPADM

## 2023-11-24 RX ORDER — DIPHENHYDRAMINE HCL 25 MG
25 CAPSULE ORAL NIGHTLY PRN
Status: DISCONTINUED | OUTPATIENT
Start: 2023-11-24 | End: 2023-11-26 | Stop reason: HOSPADM

## 2023-11-24 RX ORDER — SIMETHICONE 80 MG
1 TABLET,CHEWABLE ORAL 4 TIMES DAILY PRN
Status: DISCONTINUED | OUTPATIENT
Start: 2023-11-24 | End: 2023-11-26 | Stop reason: HOSPADM

## 2023-11-24 RX ORDER — IBUPROFEN 200 MG
24 TABLET ORAL
Status: DISCONTINUED | OUTPATIENT
Start: 2023-11-24 | End: 2023-11-26 | Stop reason: HOSPADM

## 2023-11-24 RX ORDER — INSULIN ASPART 100 [IU]/ML
0-5 INJECTION, SOLUTION INTRAVENOUS; SUBCUTANEOUS
Status: DISCONTINUED | OUTPATIENT
Start: 2023-11-24 | End: 2023-11-26 | Stop reason: HOSPADM

## 2023-11-24 RX ORDER — LEVALBUTEROL INHALATION SOLUTION 0.63 MG/3ML
0.63 SOLUTION RESPIRATORY (INHALATION) EVERY 6 HOURS PRN
Status: DISCONTINUED | OUTPATIENT
Start: 2023-11-24 | End: 2023-11-26 | Stop reason: HOSPADM

## 2023-11-24 RX ORDER — PANTOPRAZOLE SODIUM 40 MG/1
40 TABLET, DELAYED RELEASE ORAL DAILY
Status: DISCONTINUED | OUTPATIENT
Start: 2023-11-24 | End: 2023-11-26 | Stop reason: HOSPADM

## 2023-11-24 RX ADMIN — ENOXAPARIN SODIUM 120 MG: 120 INJECTION SUBCUTANEOUS at 09:11

## 2023-11-24 RX ADMIN — IOHEXOL 100 ML: 350 INJECTION, SOLUTION INTRAVENOUS at 05:11

## 2023-11-24 RX ADMIN — FUROSEMIDE 40 MG: 10 INJECTION, SOLUTION INTRAVENOUS at 03:11

## 2023-11-24 RX ADMIN — ERTAPENEM 1 G: 1 INJECTION INTRAMUSCULAR; INTRAVENOUS at 08:11

## 2023-11-24 RX ADMIN — METOROPROLOL TARTRATE 2.5 MG: 5 INJECTION, SOLUTION INTRAVENOUS at 10:11

## 2023-11-24 RX ADMIN — METOROPROLOL TARTRATE 2.5 MG: 5 INJECTION, SOLUTION INTRAVENOUS at 01:11

## 2023-11-24 RX ADMIN — DEXTROSE MONOHYDRATE 250 ML: 100 INJECTION, SOLUTION INTRAVENOUS at 09:11

## 2023-11-24 RX ADMIN — ASPIRIN 325 MG ORAL TABLET 325 MG: 325 PILL ORAL at 07:11

## 2023-11-24 RX ADMIN — QUETIAPINE FUMARATE 25 MG: 25 TABLET ORAL at 03:11

## 2023-11-24 NOTE — ASSESSMENT & PLAN NOTE
- Trop 0.081 (baseline 0.02); trend q6h  - EKG with A flutter with 4:1 AV conduction, RAD, with possible new LBBB, prolonged qt  - CXR demonstrating pulmonary edema,   - echo pending  - previous cardiac testing with nuclear stress test in 04/2022: The control electrocardiogram shows sinus tachycardia, left axis deviation, incomplete right bundle branch block, LVH with repol abnormalities. During the study no abnormal ST segment shifts were seen. There were occasional PVCs. Otherwise, no significant arrhythmias occurred. Scintigraphic imaging was a technically difficult study, there is a large perfusion defect of moderate to severe intensity in the septal, inferior, and lateral walls at rest and stress without evidence of wall motion abnormalities on gated SPECT. This most likely represents attenuation artifact rather than scar. No evidence of reversible ischemia.    - HEART score 7  - CBC, CMP (goal Mag>2, K>4) daily; lipid panel ordered  - possibly elevated in setting of CHF exacerbation, but consider cards consult if trop continues to rise as possible new LBBB on EKG  - cardiac telemetry

## 2023-11-24 NOTE — HPI
82 yo F with PMHx of HTN, T2DM, A fib (on eliquis), DVT, ICD, dementia, breast cancer s/p mastectomy, and ESBL UTI who presented to ED from [facility] for worsening mental status. Per chart review, baseline is oriented to self and to others.       In ED, /97, HR 71, satting 95% on RA. Afebrile.CBC w/ WBC 3.5. CMP unremarkable.  . Trop 0.081.Lactate 1.5. UA suspicious for recurrent UTI (started on ertapenem) w/ WBC 17, normal folate/B12 studies, echo with preserved EF; however, elevated CVP and other findings suggestive of R-sided failure. D-dimer elevated. CTH w/ focal hyperdensity along the anterior left frontal lobe, which may be artifactual, though small focus of hemorrhage cannot be excluded. Repeat CTH with no convincing acute hemorrhage. CT cervical spine with moderate spinal canal stenosis at C6-C7 and areas of moderate neural foraminal narrowing from C4 through T1. XR L shoulder with osteoarthritic changes and diffuse osteopenia, but no acute fracture or dislocation. CXR w/ central vascular congestion and possible mild perihilar interstitial edema, similar to prior study. There are scattered bandlike opacities suggesting atelectasis or scarring. No new large confluent airspace consolidation identified. Given IV lasix 40 mg and po seroquel 25 mg.CTPE study was completed and did not show PE, but was suggestive of pulmonary arterial hypertension as well as an possibly underlying infectious process. On 11/24 afternoon patient's mentation seemed to worsen, and VBG showed new respiratory acidosis to pH 7.32 and CO2 of 68. Given this new respiratory acidosis, critical care medicine was consulted.     Upon assessment by critical care team, pt somnolent. She was responding yes/no to questions but fell asleep shortly after. During blood draw, pt screaming and less somnolent. Repeat VBG w/ pH 7.4 and CO2 56.

## 2023-11-24 NOTE — ED TRIAGE NOTES
Patient arrives via EMS from [facility] with increased AMS. Patient alert to self at baseline per EMS. Patient at this time alert to self, name and birthday. Patient rambling when asked other questions. Has been treated for UTI recently, daughter told [facility] patient had foul smelling urine.

## 2023-11-24 NOTE — HPI
"Dania Her is a 84 yo F with PMHx of HTN, T2DM, A fib (on eliquis), DVT, ICD, dementia, breast cancer s/p mastectomy, and ESBL UTI who presented to ED from [facility] for worsening mental status.On my interview, patient only oriented to self and complains of L shoulder pain. Unable to obtain any further history from patient.  Per ED provider: "At baseline she is typically oriented to self and others, but otherwise confused.  Today oriented only to self.  Did not recognize EMS staff. No fever at facility, but they reported foul-smelling urine and dyspnea of unclear etiology.  No reported falls.  She tells me she had a fall a couple of weeks ago and has pain in the left shoulder."    In ED: Afebrile. VSS. Satting 93-95%  on room air. WBC 3.53. Hgb 15.1. CMP with  and hypercalcemia. Corrected calcium 11.4. . Trop 0.081. lactic 1.5. TSH 1.415. UA appears infectious with WBC 17. Initial CTH with Focal hyperdensity along the anterior left frontal lobe, which may be artifactual, though small focus of hemorrhage cannot be excluded. Repeat CTH with no convincing acute hemorrhage. Chronic R maxillary sinusitis and chronic microvascular ischemic changes noted. CT cervical spine with moderate spinal canal stenosis at C6-C7 and areas of moderate neural foraminal narrowing from C4 through T1. XR L shoulder with osteoarthritic changes and diffuse osteopenia, but no acute fracture or dislocation. CXR with There is central vascular congestion and possible mild perihilar interstitial edema, similar to prior study.  There are scattered bandlike opacities suggesting atelectasis or scarring.  No new large confluent airspace consolidation identified.  Given IV lasix 40 mg and po seroquel 25 mg. Placed in observation.   "

## 2023-11-24 NOTE — ASSESSMENT & PLAN NOTE
Patient's FSGs are controlled on current hypoglycemics.   Last A1c reviewed-   Lab Results   Component Value Date    HGBA1C 7.0 (H) 11/03/2023     Most recent fingerstick glucose reviewed-   Recent Labs   Lab 11/24/23  0108   POCTGLUCOSE 117*     Current correctional scale  Low  Maintain anti-hyperglycemic dose as follows-   Antihyperglycemics (From admission, onward)      Start     Stop Route Frequency Ordered    11/24/23 0640  insulin aspart U-100 pen 0-5 Units         -- SubQ Before meals & nightly PRN 11/24/23 0540        - diabetic diet   - hold oral antihyperglycemics

## 2023-11-24 NOTE — H&P
"  [facility] - Emergency [facility] Medicine  History & Physical    Patient Name: Dania Her  MRN: [id number]  Patient Class: OP- Observation  Admission Date: 11/24/2023  Attending Physician: Eunice Bear MD   Primary Care Provider: [facility] -         Patient information was obtained from patient and ER records.     Subjective:     Principal Problem:AMS (altered mental status)    Chief Complaint:   Chief Complaint   Patient presents with    Altered Mental Status     Pt sent from [facility] for AMS. Pt oriented to self only, hx of dementia. Per EMS staff does not know baseline. Also report foul smelling urine and SOB.         HPI: Dania Her is a 82 yo F with PMHx of HTN, T2DM, A fib (on eliquis), DVT, ICD, dementia, breast cancer s/p mastectomy, and ESBL UTI who presented to ED from [facility] for worsening mental status.On my interview, patient only oriented to self and complains of L shoulder pain. Unable to obtain any further history from patient.  Per ED provider: "At baseline she is typically oriented to self and others, but otherwise confused.  Today oriented only to self.  Did not recognize EMS staff. No fever at facility, but they reported foul-smelling urine and dyspnea of unclear etiology.  No reported falls.  She tells me she had a fall a couple of weeks ago and has pain in the left shoulder."    In ED: Afebrile. VSS. Satting 93-95%  on room air. WBC 3.53. Hgb 15.1. CMP with  and hypercalcemia. Corrected calcium 11.4. . Trop 0.081. lactic 1.5. TSH 1.415. UA appears infectious with WBC 17. Initial CTH with Focal hyperdensity along the anterior left frontal lobe, which may be artifactual, though small focus of hemorrhage cannot be excluded. Repeat CTH with no convincing acute hemorrhage. Chronic R maxillary sinusitis and chronic microvascular ischemic changes noted. CT cervical spine with moderate spinal canal stenosis at C6-C7 and areas of moderate " neural foraminal narrowing from C4 through T1. XR L shoulder with osteoarthritic changes and diffuse osteopenia, but no acute fracture or dislocation. CXR with There is central vascular congestion and possible mild perihilar interstitial edema, similar to prior study.  There are scattered bandlike opacities suggesting atelectasis or scarring.  No new large confluent airspace consolidation identified.  Given IV lasix 40 mg and po seroquel 25 mg. Placed in observation.     Past Medical History:   Diagnosis Date    Arthritis     Dementia     Diabetes mellitus     Hypertension        Past Surgical History:   Procedure Laterality Date     SECTION      OOPHORECTOMY         Review of patient's allergies indicates:   Allergen Reactions    Glimepiride        No current facility-administered medications on file prior to encounter.     Current Outpatient Medications on File Prior to Encounter   Medication Sig    albuterol (ACCUNEB) 0.63 mg/3 mL Nebu Take 0.63 mg by nebulization every 6 (six) hours as needed. Rescue    anastrozole (ARIMIDEX) 1 mg Tab Take 1 mg by mouth once daily.    bisoprolol (ZEBETA) 10 MG tablet Take 1 tablet by mouth once daily.    cholecalciferol, vitamin D3, 1,250 mcg (50,000 unit) capsule Take 50,000 Units by mouth every 30 days.    diphenhydrAMINE (BENADRYL) 25 mg capsule Take 25 mg by mouth nightly as needed for Itching or Insomnia.    donepeziL (ARICEPT) 5 MG tablet Take 1 tablet by mouth every evening.    ELIQUIS 5 mg Tab Take 5 mg by mouth 2 (two) times daily.    empagliflozin (JARDIANCE) 10 mg tablet Take 1 tablet by mouth once daily.    escitalopram oxalate (LEXAPRO) 10 MG tablet Take 10 mg by mouth once daily.    furosemide (LASIX) 20 MG tablet Take 20 mg by mouth 2 (two) times daily.    glyBURIDE (DIABETA) 5 MG tablet Take 5 mg by mouth every morning.    melatonin (MELATIN) 5 mg Take 1 tablet by mouth every evening.    nystatin (MYCOSTATIN) powder Apply 1 spray topically 2 (two) times a  day.    pantoprazole (PROTONIX) 40 MG tablet Take 1 tablet (40 mg total) by mouth once daily.    polyethylene glycol (GLYCOLAX) 17 gram/dose powder Take 17 g by mouth daily as needed.    QUEtiapine (SEROQUEL) 25 MG Tab Take 25 mg by mouth every evening.    senna-docusate 8.6-50 mg (PERICOLACE) 8.6-50 mg per tablet Take 1 tablet by mouth once daily.    sodium chloride 0.9 % PgBk 100 mL with meropenem 1 gram SolR 1 g Inject 1 g into the vein every 12 (twelve) hours.    spironolactone (ALDACTONE) 25 MG tablet Take 25 mg by mouth once daily.     Family History    None       Tobacco Use    Smoking status: Never    Smokeless tobacco: Not on file   Substance and Sexual Activity    Alcohol use: Not on file    Drug use: Not on file    Sexual activity: Not on file     Review of Systems   Reason unable to perform ROS: dementia.     Objective:     Vital Signs (Most Recent):  Temp: 98.1 °F (36.7 °C) (11/24/23 0315)  Pulse: 69 (11/24/23 0315)  Resp: 20 (11/24/23 0315)  BP: 132/75 (11/24/23 0315)  SpO2: (!) 94 % (11/24/23 0315) Vital Signs (24h Range):  Temp:  [97.7 °F (36.5 °C)-98.1 °F (36.7 °C)] 98.1 °F (36.7 °C)  Pulse:  [69-71] 69  Resp:  [20-22] 20  SpO2:  [93 %-95 %] 94 %  BP: (132-147)/(75-97) 132/75        There is no height or weight on file to calculate BMI.     Physical Exam  Vitals and nursing note reviewed.   Constitutional:       General: She is not in acute distress.     Appearance: She is well-developed. She is obese. She is ill-appearing.   HENT:      Head: Normocephalic and atraumatic.      Mouth/Throat:      Pharynx: No oropharyngeal exudate.   Eyes:      Conjunctiva/sclera: Conjunctivae normal.      Pupils: Pupils are equal, round, and reactive to light.   Cardiovascular:      Rate and Rhythm: Normal rate and regular rhythm.      Heart sounds: Normal heart sounds.   Pulmonary:      Effort: Pulmonary effort is normal. No respiratory distress.      Breath sounds: Wheezing and rales present.      Comments: Exam  limited by patient participation. Mild tachypnea  Abdominal:      General: Bowel sounds are normal. There is no distension.      Palpations: Abdomen is soft.      Tenderness: There is no abdominal tenderness.   Musculoskeletal:         General: Tenderness (L shoulder) present. Normal range of motion.      Cervical back: Normal range of motion and neck supple.      Right lower leg: Edema (1+) present.      Left lower leg: Edema (1+) present.   Lymphadenopathy:      Cervical: No cervical adenopathy.   Skin:     General: Skin is warm and dry.      Capillary Refill: Capillary refill takes less than 2 seconds.      Findings: No rash.      Comments: Venous stasis changes to BLE   Neurological:      Mental Status: She is alert. She is disoriented.      Comments: Difficult to access   Psychiatric:         Cognition and Memory: Memory is impaired.              CRANIAL NERVES     CN III, IV, VI   Pupils are equal, round, and reactive to light.       Significant Labs: All pertinent labs within the past 24 hours have been reviewed.  CBC:   Recent Labs   Lab 11/24/23  0123   WBC 3.53*   HGB 15.1   HCT 46.5   PLT 45*     CMP:   Recent Labs   Lab 11/24/23  0123      K 4.0      CO2 23   *   BUN 10   CREATININE 0.7   CALCIUM 10.8*   PROT 8.0   ALBUMIN 3.3*   BILITOT 4.3*   ALKPHOS 118   AST 16   ALT 9*   ANIONGAP 14     Cardiac Markers:   Recent Labs   Lab 11/24/23  0123   *     Lactic Acid:   Recent Labs   Lab 11/24/23  0123   LACTATE 1.5     Troponin:   Recent Labs   Lab 11/24/23  0123   TROPONINI 0.081*     TSH:   Recent Labs   Lab 11/24/23  0123   TSH 1.415     Urine Studies:   Recent Labs   Lab 11/24/23  0256   COLORU Yellow   APPEARANCEUA Clear   PHUR 5.0   SPECGRAV 1.015   PROTEINUA Negative   GLUCUA 3+*   KETONESU Negative   BILIRUBINUA Negative   OCCULTUA 1+*   NITRITE Negative   LEUKOCYTESUR Trace*   RBCUA 2   WBCUA 17*   BACTERIA Rare   SQUAMEPITHEL 1       Significant Imaging: I have reviewed  all pertinent imaging results/findings within the past 24 hours.  Imaging Results              CT Head Without Contrast (Final result)  Result time 11/24/23 04:45:05      Final result by Rhys Rodríguez MD (11/24/23 04:45:05)                   Impression:      Allowing for motion artifacts, there is no convincing CT evidence of acute intracranial hemorrhage.    Right maxillary sinusitis, incompletely visualized.      Electronically signed by: Rhys Rodríguez  Date:    11/24/2023  Time:    04:45               Narrative:    EXAMINATION:  CT HEAD WITHOUT CONTRAST    CLINICAL HISTORY:  Mental status change, unknown cause;    TECHNIQUE:  Low dose axial images were obtained through the head.  Coronal and sagittal reformations were also performed. Contrast was not administered.    COMPARISON:  Noncontrast head CT obtained approximately 2.5 hours earlier on the same date, 11/24/2023    FINDINGS:  There are again numerous motion artifacts.  Allowing for this, no definite acute intracranial hemorrhage is identified.    No discrete intracranial mass or mass effect, discrete acute large vascular territory brain infarct, or other acute intracranial abnormality.    There remains generalized cerebral and cerebellar parenchymal volume loss, with a proportionate degree of (presumed ex vacuo) enlargement of the ventricles, cisterns, sulci, and fissures.    There remains patchy parenchymal hypoattenuation in portions of the cerebral white matter; this is nonspecific but is favored to be related to chronic small vessel ischemia.    No depressed calvarial fracture.    Bilateral frontoparietal and temporal calvarial hyperostosis interna.    There remains mucosal thickening and osteoneogenesis in the right maxillary sinus, compatible with chronic sinusitis.                                        CT Head Without Contrast (Final result)  Result time 11/24/23 03:11:20      Final result by Rhys Rodríguez MD (11/24/23 03:11:20)                    Impression:      Motion-degraded scan.    Focal hyperdensity along the anterior left frontal lobe, which may be artifactual, though small focus of hemorrhage cannot be excluded.  Please see above for further discussion.  Further evaluation with MRI or follow-up CT as clinically warranted.    Involutional changes and evidence of microvascular ischemic disease.    Chronic right maxillary sinusitis, likely odontogenic.    This report was flagged in Epic as abnormal.    Electronically signed by resident: Yohana Wang  Date:    11/24/2023  Time:    01:45    Electronically signed by: Rhys Rodríguez  Date:    11/24/2023  Time:    03:11               Narrative:    EXAMINATION:  CT HEAD WITHOUT CONTRAST    CLINICAL HISTORY:  Mental status change, unknown cause;    TECHNIQUE:  Low dose axial CT images obtained throughout the head without the use of intravenous contrast.  Axial, sagittal and coronal reconstructions were performed.    COMPARISON:  None.    FINDINGS:  CT head:    At the anterior paramedian left frontal lobe there is a small focal hyperdensity, which is favored to represent artifact related to calvarial hyperostosis with nearby osseous material, though parenchymal contusion or subdural hemorrhage may have a similar appearance, and cannot be excluded.    Mild generalized cerebral volume loss with compensatory prominence of the ventricles and sulci.  There is patchy hypoattenuation through the supratentorial white matter which is nonspecific but may represent chronic microvascular ischemic changes.  No attenuation abnormality to suggest recent or remote major vascular distribution infarct.    No definite extra-axial blood or fluid collections.  Please see above for further discussion.    No displaced calvarial fracture.  There is bilateral frontoparietal calvarial hyperostosis interna.    There is poor dentition.  There is a dental carry on the right which appears to communicate with a right maxillary sinus,  concerning for odontogenic sinusitis.  The right maxillary sinus contains large volume of mixed density material (some of which is calcified, suggesting chronic infection).                                       CT Cervical Spine Without Contrast (Final result)  Result time 11/24/23 02:55:39      Final result by Rhys Rodríguez MD (11/24/23 02:55:39)                   Impression:      No evidence of acute fracture or traumatic subluxation.    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    Multilevel spondylosis detailed above which results and moderate spinal canal stenosis at C6-C7 and areas of moderate neural foraminal narrowing from C4 through T1.    Electronically signed by resident: Yohana Wang  Date:    11/24/2023  Time:    01:58    Electronically signed by: Rhys Rodríguez  Date:    11/24/2023  Time:    02:55               Narrative:    EXAMINATION:  CT CERVICAL SPINE WITHOUT CONTRAST    CLINICAL HISTORY:  Neck pain, acute, no red flags;    TECHNIQUE:  Low dose axial images, sagittal and coronal reformations were performed though the cervical spine.  Contrast was not administered.    COMPARISON:  CTA 02/11/2023    FINDINGS:  Alignment: Straightening of cervical lordosis.    Vertebrae: No fracture.  No lytic or blastic lesion.    Discs: Multilevel disc height loss, most pronounced at C5-C6 and C6-C7.    C1-2: Dens is intact.  Pre-dens space is maintained.  There is a remote fracture of the posterior arch of C1 versus congenital incomplete C1. the right lateral mass of C1 is subluxed somewhat laterally relative to the right lateral mass of C2, possibly congenital in the setting of incomplete C1 posterior arch.    Skull base and craniocervical junction: Normal.    Degenerative findings:    Multiple levels demonstrate facet arthropathy and uncovertebral spurring.    C2-C3: No spinal canal stenosis or neural foraminal  narrowing.    C3-C4: Mild bilateral neural foraminal narrowing.    C4-C5: Moderate right mild left neural foraminal narrowing.    C5-C6: Moderate bilateral neural foraminal narrowing.    C6-C7: Mild right moderate left neural foraminal narrowing.  There is posterior disc osteophyte complex which contributes to moderate spinal canal stenosis.    C7-T1: Moderate bilateral neural foraminal narrowing.    Paraspinal muscles & soft tissues: Atrophy of the paraspinal musculature.  Intracranial and skull base findings dictated separately: See CT head 11/24/2023.  Trace intravenous gas in the right lower neck/upper chest.                                       X-Ray Shoulder Trauma Left (Final result)  Result time 11/24/23 01:22:25      Final result by Kian Ruvalcaba MD (11/24/23 01:22:25)                   Impression:      As above.      Electronically signed by: Kian Ruvalcaba MD  Date:    11/24/2023  Time:    01:22               Narrative:    EXAMINATION:  XR SHOULDER TRAUMA 3 VIEW LEFT    CLINICAL HISTORY:  Pain in arm, unspecified    TECHNIQUE:  Three views of the left shoulder were performed.    COMPARISON  None    FINDINGS:  There is diffuse osteopenia.  No grossly displaced fracture of the left humerus appreciated.  No evidence of dislocation.  There is advanced osteoarthrosis of the left glenohumeral joint with mild remodeling of the humeral head.  There is significant joint space narrowing with prominent subchondral cystic change and marginal osteophyte formation involving the glenoid and humeral head.  There is osteoarthrosis of the acromioclavicular joint.                                       X-Ray Chest AP Portable (Final result)  Result time 11/24/23 01:05:59      Final result by Kian Ruvalcaba MD (11/24/23 01:05:59)                   Impression:      As above.      Electronically signed by: Kian Ruvalcaba MD  Date:    11/24/2023  Time:    01:05               Narrative:    EXAMINATION:  XR CHEST AP  PORTABLE    CLINICAL HISTORY:  ams;    TECHNIQUE:  Single frontal view of the chest was performed.    COMPARISON:  11/02/2023    FINDINGS:  Cardiac monitoring leads overlie the chest.  Implantable cardiac device projects over the cardiac silhouette.  Cardiac silhouette is enlarged, similar to prior examination.  There is central vascular congestion and possible mild perihilar interstitial edema, similar to prior study.  There are scattered bandlike opacities suggesting atelectasis or scarring.  No new large confluent airspace consolidation identified.  No significant volume of pleural fluid or pneumothorax appreciated.  Osseous structures demonstrate degenerative changes.                                    Assessment/Plan:     * AMS (altered mental status)  - Afebrile but with leukopenia  - UA infectious  - CXR with pulmonary edema and   - trop elevated and with possible new LBBB on EKG  - CT head initially concerning for possible hemorrhage, but repeat showed no acute process  - VBG with CO2 52.8 but ph 7.43  - TSH wnl   - ddx: UTI infection, CHF, dehydration, polypharmacy, worsening dementia, delirium, CVA  - treat UTI and CHF. Repeat head imaging if clinically warranted  - Neuro checks q4h  - Delirium precautions    Acute on chronic heart failure  - Acute on Chronic. Hx of diastolic dysfunction  - CHF is currently uncontrolled due to volume overload due to: Continued edema of extremities and Pulmonary edema/pleural effusion on CXR.   - Latest ECHO performed and demonstrates- No results found for this or any previous visit.  .   - Continue Beta Blocker Furosemide Aldactone and monitor clinical status closely.   - Monitor on telemetry.   - Patient is on CHF pathway.    - Monitor strict Is&Os and daily weights.    - Place on fluid restriction of 1.5 L.   - Continue to stress to patient importance of self efficacy and  on diet for CHF.   - Last BNP reviewed- and noted below   Recent Labs   Lab  11/24/23  0123   *   - start IV lasix 40 mg BID      Urinary tract infection due to extended-spectrum beta lactamase (ESBL) producing Escherichia coli  - hx of ESBL UTI  - daughter reporting foul smelling urine   - UA infectious and CBC with leukopenia  - will start ertapenem and consult ID  - follow urine cx    Elevated troponin  - Trop 0.081 (baseline 0.02); trend q6h  - EKG with A flutter with 4:1 AV conduction, RAD, with possible new LBBB, prolonged qt  - CXR demonstrating pulmonary edema,   - echo pending  - previous cardiac testing with nuclear stress test in 04/2022: The control electrocardiogram shows sinus tachycardia, left axis deviation, incomplete right bundle branch block, LVH with repol abnormalities. During the study no abnormal ST segment shifts were seen. There were occasional PVCs. Otherwise, no significant arrhythmias occurred. Scintigraphic imaging was a technically difficult study, there is a large perfusion defect of moderate to severe intensity in the septal, inferior, and lateral walls at rest and stress without evidence of wall motion abnormalities on gated SPECT. This most likely represents attenuation artifact rather than scar. No evidence of reversible ischemia.    - HEART score 7  - CBC, CMP (goal Mag>2, K>4) daily; lipid panel ordered  - possibly elevated in setting of CHF exacerbation, but consider cards consult if trop continues to rise as possible new LBBB on EKG  - cardiac telemetry    Type 2 diabetes mellitus, with long-term current use of insulin  Patient's FSGs are controlled on current hypoglycemics.   Last A1c reviewed-   Lab Results   Component Value Date    HGBA1C 7.0 (H) 11/03/2023     Most recent fingerstick glucose reviewed-   Recent Labs   Lab 11/24/23  0108   POCTGLUCOSE 117*     Current correctional scale  Low  Maintain anti-hyperglycemic dose as follows-   Antihyperglycemics (From admission, onward)      Start     Stop Route Frequency Ordered    11/24/23  0640  insulin aspart U-100 pen 0-5 Units         -- SubQ Before meals & nightly PRN 11/24/23 0540        - diabetic diet   - hold oral antihyperglycemics    Primary hypertension  - controlled on admit  - continue spironolactone  - continue BB (bisoprolol not on formulary >> use metoprolol)  - start IV lasix    Dementia  - dementia at baseline. Typically only oriented to self and others  - continue donepezil  - delirium precautions     Chronic pain of both shoulders  - L shoulder XR with OA and osteopenia but without acute process  - continue prn tramadol    Breast cancer  - chronic, s/p mastectomy with chronic chest wall pain  - continue home anastrozole    History of DVT of lower extremity  - continue eliquis    Atrial fibrillation  Patient with Persistent (7 days or more) atrial fibrillation which is controlled currently with Beta Blocker and Calcium Channel Blocker. Patient is currently in atrial fibrillation.[id number] Score: 5. Anticoagulation indicated. Anticoagulation done with eliquis 5 mg BID .      VTE Risk Mitigation (From admission, onward)           Ordered     apixaban tablet 5 mg  2 times daily         11/24/23 0649     IP VTE HIGH RISK PATIENT  Once         11/24/23 0517     Place sequential compression device  Until discontinued         11/24/23 0517     IP VTE HIGH RISK PATIENT  Once         11/24/23 0517     Reason for No Pharmacological VTE Prophylaxis  Once        Question:  Reasons:  Answer:  Already adequately anticoagulated on oral Anticoagulants    11/24/23 0517                         On 11/24/2023, patient should be placed in hospital observation services under my care in collaboration with Dr. Patience Evans.           Dee Figueroa PA-C  Department of Hospital Medicine  [facility] - Emergency Dept

## 2023-11-24 NOTE — ASSESSMENT & PLAN NOTE
- hx of ESBL UTI  - daughter reporting foul smelling urine   - UA infectious and CBC with leukopenia  - will start ertapenem and consult ID  - follow urine cx

## 2023-11-24 NOTE — ASSESSMENT & PLAN NOTE
- Acute on Chronic. Hx of diastolic dysfunction  - CHF is currently uncontrolled due to volume overload due to: Continued edema of extremities and Pulmonary edema/pleural effusion on CXR.   - Latest ECHO performed and demonstrates- No results found for this or any previous visit.  .   - Continue Beta Blocker Furosemide Aldactone and monitor clinical status closely.   - Monitor on telemetry.   - Patient is on CHF pathway.    - Monitor strict Is&Os and daily weights.    - Place on fluid restriction of 1.5 L.   - Continue to stress to patient importance of self efficacy and  on diet for CHF.   - Last BNP reviewed- and noted below   Recent Labs   Lab 11/24/23  0123   *   - start IV lasix 40 mg BID

## 2023-11-24 NOTE — ED NOTES
Telemetry Verification   Patient placed on Telemetry Box    Box # [id number]   CHANDA Maldonado   Rate 72   Rhythm A flutter

## 2023-11-24 NOTE — CARE UPDATE
Brief care update:    Initial concern for acute on chronic HF given patient's elevated BNP. Upon further chart review, it appears BNP is usually 800-1000 when patient is in an acute exacerbation. Dry mucous membranes on examination. Lasix stopped. She is receiving Ertapenem given concerns for ESBL UTI. Of note, TTE w/ CVP 15 but there was mention of 'RV dilation w/ decreased function' w/ concern for 'pHTN vs pulmonary disease vs PE'. D-dimer 5.42. STAT CTA chest ordered to rule out PE. This morning patient was disoriented on my exam but otherwise awake, very vocal and able to sit up and drink water from a straw. She has progressively become more somnolent throughout the day. Most recent vBG w/ worsening hypercapnia (pH 7.321/pCO2 68.3). MICU consulted for encephalopathy and hypercapnia.     Anna Mcintosh MD  Internal Medicine, PGY-3

## 2023-11-24 NOTE — PLAN OF CARE
[facility] - Emergency Dept  Discharge Assessment    [facility]     Pt is a resident of [facility] and will return on d/c     Updated clinicals sent     Discharge Assessment (most recent)       BRIEF DISCHARGE ASSESSMENT - 11/24/23 0749          Discharge Planning    Assessment Type Discharge Planning Brief Assessment (P)      Resource/Environmental Concerns none (P)      Support Systems Family members;Home care staff (P)      Equipment Currently Used at Home other (see comments) (P)    snf resident of [facility]    Current Living Arrangements residential facility (P)      Care Facility Name [facility] (P)      Patient/Family Anticipates Transition to long-term care facility (P)      Patient/Family Anticipated Services at Transition none (P)      DME Needed Upon Discharge  none (P)      Discharge Plan A Return to nursing home (P)      Discharge Plan B Return to Nursing Home (P)                        Naima Zuluaga CD, MSW, LMSW, RSW   Case Management  [facility]  Email: [email or web address]

## 2023-11-24 NOTE — ED NOTES
Telemetry Verification   Patient placed on Telemetry Box  Verified with War Room  Box # [id number]   Monitor Tech Kemal    Rate 73   Rhythm A flutter

## 2023-11-24 NOTE — ASSESSMENT & PLAN NOTE
- dementia at baseline. Typically only oriented to self and others  - continue donepezil  - delirium precautions

## 2023-11-24 NOTE — PHARMACY MED REC
"Admission Medication History     The home medication history was taken by Shamir Leblanc.    You may go to "Admission" then "Reconcile Home Medications" tabs to review and/or act upon these items.     The home medication list has been updated by the Pharmacy department.   Please read ALL comments highlighted in yellow.   Please address this information as you see fit.    Feel free to contact us if you have any questions or require assistance.      Medications listed below were obtained from: Nursing home  PTA Medications   Medication Sig    albuterol (ACCUNEB) 0.63 mg/3 mL Nebu Take 0.63 mg by nebulization every 6 (six) hours as needed. Rescue    anastrozole (ARIMIDEX) 1 mg Tab Take 1 mg by mouth once daily.    bisoprolol (ZEBETA) 10 MG tablet Take 1 tablet by mouth once daily.    cholecalciferol, vitamin D3, 1,250 mcg (50,000 unit) capsule Take 50,000 Units by mouth every 30 days.    diphenhydrAMINE (BENADRYL) 50 MG capsule Take 50 mg by mouth nightly as needed for Itching or Insomnia.    donepeziL (ARICEPT) 5 MG tablet Take 1 tablet by mouth every evening.    ELIQUIS 5 mg Tab Take 5 mg by mouth 2 (two) times daily.    empagliflozin (JARDIANCE) 10 mg tablet Take 1 tablet by mouth once daily.    escitalopram oxalate (LEXAPRO) 10 MG tablet Take 10 mg by mouth once daily.    furosemide (LASIX) 20 MG tablet Take 20 mg by mouth 2 (two) times daily.    glyBURIDE (DIABETA) 5 MG tablet Take 5 mg by mouth every morning.    melatonin (MELATIN) 5 mg Take 1 tablet by mouth every evening.    nitrofurantoin, macrocrystal-monohydrate, (MACROBID) 100 MG capsule Take 100 mg by mouth 2 (two) times daily. X 7 days    nystatin (MYCOSTATIN) powder Apply topically 2 (two) times a day.    pantoprazole (PROTONIX) 40 MG tablet Take 1 tablet (40 mg total) by mouth once daily.    polyethylene glycol (GLYCOLAX) 17 gram/dose powder Take 17 g by mouth daily as needed for Constipation. (With 8 oz of water)    QUEtiapine (SEROQUEL) 25 MG Tab Take " 25 mg by mouth every evening.    senna-docusate 8.6-50 mg (PERICOLACE) 8.6-50 mg per tablet Take 1 tablet by mouth once daily.    spironolactone (ALDACTONE) 25 MG tablet Take 25 mg by mouth once daily.    traMADoL (ULTRAM) 50 mg tablet Take 50 mg by mouth every 6 (six) hours as needed for Pain.    traZODone (DESYREL) 50 MG tablet Take 50 mg by mouth every evening.    sodium chloride 0.9 % PgBk 100 mL with meropenem 1 gram SolR 1 g Inject 1 g into the vein every 12 (twelve) hours. Not listed on NH MAR.       Potential issues to be addressed PRIOR TO DISCHARGE  The listed medications were obtained from another facility ([facility]). The patient may not have been able to fill these prescriptions prior to this admission and may require new scripts upon discharge.           Shamir Leblanc  EXT [id number]                .

## 2023-11-24 NOTE — ASSESSMENT & PLAN NOTE
- controlled on admit  - continue spironolactone  - continue BB (bisoprolol not on formulary >> use metoprolol)  - start IV lasix

## 2023-11-24 NOTE — ASSESSMENT & PLAN NOTE
Patient with Persistent (7 days or more) atrial fibrillation which is controlled currently with Beta Blocker and Calcium Channel Blocker. Patient is currently in atrial fibrillation.[id number] Score: 5. Anticoagulation indicated. Anticoagulation done with eliquis 5 mg BID .

## 2023-11-24 NOTE — SUBJECTIVE & OBJECTIVE
Past Medical History:   Diagnosis Date    Arthritis     Dementia     Diabetes mellitus     Hypertension        Past Surgical History:   Procedure Laterality Date     SECTION      OOPHORECTOMY         Review of patient's allergies indicates:   Allergen Reactions    Glimepiride        No current facility-administered medications on file prior to encounter.     Current Outpatient Medications on File Prior to Encounter   Medication Sig    albuterol (ACCUNEB) 0.63 mg/3 mL Nebu Take 0.63 mg by nebulization every 6 (six) hours as needed. Rescue    anastrozole (ARIMIDEX) 1 mg Tab Take 1 mg by mouth once daily.    bisoprolol (ZEBETA) 10 MG tablet Take 1 tablet by mouth once daily.    cholecalciferol, vitamin D3, 1,250 mcg (50,000 unit) capsule Take 50,000 Units by mouth every 30 days.    diphenhydrAMINE (BENADRYL) 25 mg capsule Take 25 mg by mouth nightly as needed for Itching or Insomnia.    donepeziL (ARICEPT) 5 MG tablet Take 1 tablet by mouth every evening.    ELIQUIS 5 mg Tab Take 5 mg by mouth 2 (two) times daily.    empagliflozin (JARDIANCE) 10 mg tablet Take 1 tablet by mouth once daily.    escitalopram oxalate (LEXAPRO) 10 MG tablet Take 10 mg by mouth once daily.    furosemide (LASIX) 20 MG tablet Take 20 mg by mouth 2 (two) times daily.    glyBURIDE (DIABETA) 5 MG tablet Take 5 mg by mouth every morning.    melatonin (MELATIN) 5 mg Take 1 tablet by mouth every evening.    nystatin (MYCOSTATIN) powder Apply 1 spray topically 2 (two) times a day.    pantoprazole (PROTONIX) 40 MG tablet Take 1 tablet (40 mg total) by mouth once daily.    polyethylene glycol (GLYCOLAX) 17 gram/dose powder Take 17 g by mouth daily as needed.    QUEtiapine (SEROQUEL) 25 MG Tab Take 25 mg by mouth every evening.    senna-docusate 8.6-50 mg (PERICOLACE) 8.6-50 mg per tablet Take 1 tablet by mouth once daily.    sodium chloride 0.9 % PgBk 100 mL with meropenem 1 gram SolR 1 g Inject 1 g into the vein every 12 (twelve) hours.     spironolactone (ALDACTONE) 25 MG tablet Take 25 mg by mouth once daily.     Family History    None       Tobacco Use    Smoking status: Never    Smokeless tobacco: Not on file   Substance and Sexual Activity    Alcohol use: Not on file    Drug use: Not on file    Sexual activity: Not on file     Review of Systems   Reason unable to perform ROS: dementia.     Objective:     Vital Signs (Most Recent):  Temp: 98.1 °F (36.7 °C) (11/24/23 0315)  Pulse: 69 (11/24/23 0315)  Resp: 20 (11/24/23 0315)  BP: 132/75 (11/24/23 0315)  SpO2: (!) 94 % (11/24/23 0315) Vital Signs (24h Range):  Temp:  [97.7 °F (36.5 °C)-98.1 °F (36.7 °C)] 98.1 °F (36.7 °C)  Pulse:  [69-71] 69  Resp:  [20-22] 20  SpO2:  [93 %-95 %] 94 %  BP: (132-147)/(75-97) 132/75        There is no height or weight on file to calculate BMI.     Physical Exam  Vitals and nursing note reviewed.   Constitutional:       General: She is not in acute distress.     Appearance: She is well-developed. She is obese. She is ill-appearing.   HENT:      Head: Normocephalic and atraumatic.      Mouth/Throat:      Pharynx: No oropharyngeal exudate.   Eyes:      Conjunctiva/sclera: Conjunctivae normal.      Pupils: Pupils are equal, round, and reactive to light.   Cardiovascular:      Rate and Rhythm: Normal rate and regular rhythm.      Heart sounds: Normal heart sounds.   Pulmonary:      Effort: Pulmonary effort is normal. No respiratory distress.      Breath sounds: Wheezing and rales present.      Comments: Exam limited by patient participation. Mild tachypnea  Abdominal:      General: Bowel sounds are normal. There is no distension.      Palpations: Abdomen is soft.      Tenderness: There is no abdominal tenderness.   Musculoskeletal:         General: Tenderness (L shoulder) present. Normal range of motion.      Cervical back: Normal range of motion and neck supple.      Right lower leg: Edema (1+) present.      Left lower leg: Edema (1+) present.   Lymphadenopathy:       Cervical: No cervical adenopathy.   Skin:     General: Skin is warm and dry.      Capillary Refill: Capillary refill takes less than 2 seconds.      Findings: No rash.      Comments: Venous stasis changes to BLE   Neurological:      Mental Status: She is alert. She is disoriented.      Comments: Difficult to access   Psychiatric:         Cognition and Memory: Memory is impaired.              CRANIAL NERVES     CN III, IV, VI   Pupils are equal, round, and reactive to light.       Significant Labs: All pertinent labs within the past 24 hours have been reviewed.  CBC:   Recent Labs   Lab 11/24/23  0123   WBC 3.53*   HGB 15.1   HCT 46.5   PLT 45*     CMP:   Recent Labs   Lab 11/24/23  0123      K 4.0      CO2 23   *   BUN 10   CREATININE 0.7   CALCIUM 10.8*   PROT 8.0   ALBUMIN 3.3*   BILITOT 4.3*   ALKPHOS 118   AST 16   ALT 9*   ANIONGAP 14     Cardiac Markers:   Recent Labs   Lab 11/24/23  0123   *     Lactic Acid:   Recent Labs   Lab 11/24/23  0123   LACTATE 1.5     Troponin:   Recent Labs   Lab 11/24/23  0123   TROPONINI 0.081*     TSH:   Recent Labs   Lab 11/24/23  0123   TSH 1.415     Urine Studies:   Recent Labs   Lab 11/24/23  0256   COLORU Yellow   APPEARANCEUA Clear   PHUR 5.0   SPECGRAV 1.015   PROTEINUA Negative   GLUCUA 3+*   KETONESU Negative   BILIRUBINUA Negative   OCCULTUA 1+*   NITRITE Negative   LEUKOCYTESUR Trace*   RBCUA 2   WBCUA 17*   BACTERIA Rare   SQUAMEPITHEL 1       Significant Imaging: I have reviewed all pertinent imaging results/findings within the past 24 hours.  Imaging Results              CT Head Without Contrast (Final result)  Result time 11/24/23 04:45:05      Final result by Rhys Rodríguez MD (11/24/23 04:45:05)                   Impression:      Allowing for motion artifacts, there is no convincing CT evidence of acute intracranial hemorrhage.    Right maxillary sinusitis, incompletely visualized.      Electronically signed by: Rhys  Marcos  Date:    11/24/2023  Time:    04:45               Narrative:    EXAMINATION:  CT HEAD WITHOUT CONTRAST    CLINICAL HISTORY:  Mental status change, unknown cause;    TECHNIQUE:  Low dose axial images were obtained through the head.  Coronal and sagittal reformations were also performed. Contrast was not administered.    COMPARISON:  Noncontrast head CT obtained approximately 2.5 hours earlier on the same date, 11/24/2023    FINDINGS:  There are again numerous motion artifacts.  Allowing for this, no definite acute intracranial hemorrhage is identified.    No discrete intracranial mass or mass effect, discrete acute large vascular territory brain infarct, or other acute intracranial abnormality.    There remains generalized cerebral and cerebellar parenchymal volume loss, with a proportionate degree of (presumed ex vacuo) enlargement of the ventricles, cisterns, sulci, and fissures.    There remains patchy parenchymal hypoattenuation in portions of the cerebral white matter; this is nonspecific but is favored to be related to chronic small vessel ischemia.    No depressed calvarial fracture.    Bilateral frontoparietal and temporal calvarial hyperostosis interna.    There remains mucosal thickening and osteoneogenesis in the right maxillary sinus, compatible with chronic sinusitis.                                        CT Head Without Contrast (Final result)  Result time 11/24/23 03:11:20      Final result by Rhys Rodríguez MD (11/24/23 03:11:20)                   Impression:      Motion-degraded scan.    Focal hyperdensity along the anterior left frontal lobe, which may be artifactual, though small focus of hemorrhage cannot be excluded.  Please see above for further discussion.  Further evaluation with MRI or follow-up CT as clinically warranted.    Involutional changes and evidence of microvascular ischemic disease.    Chronic right maxillary sinusitis, likely odontogenic.    This report was flagged in  Epic as abnormal.    Electronically signed by resident: Yohana Wang  Date:    11/24/2023  Time:    01:45    Electronically signed by: Rhys Rodríguez  Date:    11/24/2023  Time:    03:11               Narrative:    EXAMINATION:  CT HEAD WITHOUT CONTRAST    CLINICAL HISTORY:  Mental status change, unknown cause;    TECHNIQUE:  Low dose axial CT images obtained throughout the head without the use of intravenous contrast.  Axial, sagittal and coronal reconstructions were performed.    COMPARISON:  None.    FINDINGS:  CT head:    At the anterior paramedian left frontal lobe there is a small focal hyperdensity, which is favored to represent artifact related to calvarial hyperostosis with nearby osseous material, though parenchymal contusion or subdural hemorrhage may have a similar appearance, and cannot be excluded.    Mild generalized cerebral volume loss with compensatory prominence of the ventricles and sulci.  There is patchy hypoattenuation through the supratentorial white matter which is nonspecific but may represent chronic microvascular ischemic changes.  No attenuation abnormality to suggest recent or remote major vascular distribution infarct.    No definite extra-axial blood or fluid collections.  Please see above for further discussion.    No displaced calvarial fracture.  There is bilateral frontoparietal calvarial hyperostosis interna.    There is poor dentition.  There is a dental carry on the right which appears to communicate with a right maxillary sinus, concerning for odontogenic sinusitis.  The right maxillary sinus contains large volume of mixed density material (some of which is calcified, suggesting chronic infection).                                       CT Cervical Spine Without Contrast (Final result)  Result time 11/24/23 02:55:39      Final result by Rhys Rodríguez MD (11/24/23 02:55:39)                   Impression:      No evidence of acute fracture or traumatic  subluxation.    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    Multilevel spondylosis detailed above which results and moderate spinal canal stenosis at C6-C7 and areas of moderate neural foraminal narrowing from C4 through T1.    Electronically signed by resident: Yohana Wang  Date:    11/24/2023  Time:    01:58    Electronically signed by: Rhys Rodríguez  Date:    11/24/2023  Time:    02:55               Narrative:    EXAMINATION:  CT CERVICAL SPINE WITHOUT CONTRAST    CLINICAL HISTORY:  Neck pain, acute, no red flags;    TECHNIQUE:  Low dose axial images, sagittal and coronal reformations were performed though the cervical spine.  Contrast was not administered.    COMPARISON:  CTA 02/11/2023    FINDINGS:  Alignment: Straightening of cervical lordosis.    Vertebrae: No fracture.  No lytic or blastic lesion.    Discs: Multilevel disc height loss, most pronounced at C5-C6 and C6-C7.    C1-2: Dens is intact.  Pre-dens space is maintained.  There is a remote fracture of the posterior arch of C1 versus congenital incomplete C1. the right lateral mass of C1 is subluxed somewhat laterally relative to the right lateral mass of C2, possibly congenital in the setting of incomplete C1 posterior arch.    Skull base and craniocervical junction: Normal.    Degenerative findings:    Multiple levels demonstrate facet arthropathy and uncovertebral spurring.    C2-C3: No spinal canal stenosis or neural foraminal narrowing.    C3-C4: Mild bilateral neural foraminal narrowing.    C4-C5: Moderate right mild left neural foraminal narrowing.    C5-C6: Moderate bilateral neural foraminal narrowing.    C6-C7: Mild right moderate left neural foraminal narrowing.  There is posterior disc osteophyte complex which contributes to moderate spinal canal stenosis.    C7-T1: Moderate bilateral neural foraminal narrowing.    Paraspinal muscles & soft tissues:  Atrophy of the paraspinal musculature.  Intracranial and skull base findings dictated separately: See CT head 11/24/2023.  Trace intravenous gas in the right lower neck/upper chest.                                       X-Ray Shoulder Trauma Left (Final result)  Result time 11/24/23 01:22:25      Final result by Kian Ruvalcaba MD (11/24/23 01:22:25)                   Impression:      As above.      Electronically signed by: Kian Ruvalcaba MD  Date:    11/24/2023  Time:    01:22               Narrative:    EXAMINATION:  XR SHOULDER TRAUMA 3 VIEW LEFT    CLINICAL HISTORY:  Pain in arm, unspecified    TECHNIQUE:  Three views of the left shoulder were performed.    COMPARISON  None    FINDINGS:  There is diffuse osteopenia.  No grossly displaced fracture of the left humerus appreciated.  No evidence of dislocation.  There is advanced osteoarthrosis of the left glenohumeral joint with mild remodeling of the humeral head.  There is significant joint space narrowing with prominent subchondral cystic change and marginal osteophyte formation involving the glenoid and humeral head.  There is osteoarthrosis of the acromioclavicular joint.                                       X-Ray Chest AP Portable (Final result)  Result time 11/24/23 01:05:59      Final result by Kian Ruvalcaba MD (11/24/23 01:05:59)                   Impression:      As above.      Electronically signed by: Kian Ruvalcaba MD  Date:    11/24/2023  Time:    01:05               Narrative:    EXAMINATION:  XR CHEST AP PORTABLE    CLINICAL HISTORY:  ams;    TECHNIQUE:  Single frontal view of the chest was performed.    COMPARISON:  11/02/2023    FINDINGS:  Cardiac monitoring leads overlie the chest.  Implantable cardiac device projects over the cardiac silhouette.  Cardiac silhouette is enlarged, similar to prior examination.  There is central vascular congestion and possible mild perihilar interstitial edema, similar to prior study.  There are  scattered bandlike opacities suggesting atelectasis or scarring.  No new large confluent airspace consolidation identified.  No significant volume of pleural fluid or pneumothorax appreciated.  Osseous structures demonstrate degenerative changes.

## 2023-11-24 NOTE — PROGRESS NOTES
"   11/24/23 1225   Vital Signs   Temp 97.3 °F (36.3 °C)   Temp Source Axillary   Pulse 73   Heart Rate Source Monitor   Resp 16   SpO2 97 %   Flow (L/min) 2   Device (Oxygen Therapy) nasal cannula   BP (!) 144/79   MAP (mmHg) 100   BP Location Right arm   Patient Position Lying   Height and Weight   Height 5' 5" (1.651 m)   Weight 112.5 kg (248 lb 0.3 oz)   Weight Method Bed Scale   BSA (Calculated - sq m) 2.27 sq meters   BMI (Calculated) 41.3   Weight in (lb) to have BMI = 25 149.9   (RETIRED)      Pain Scale: PAINAD   PAINAD Breathing 0-->normal   PAINAD Negative Vocalization 0-->none   PAINAD Facial Expression 2-->facial grimacing   PAINAD Body Language 0-->relaxed   PAINAD Consolability 0-->no need to console   PAINAD Score 2   Assessments (Pre/Post)   Level of Consciousness (AVPU) responds to voice     Pt arrived on the unit. Pt lethargic. No distress noted. NC on 2L. Bed in lowest position. Side rails up x2. Call bell and personal belongs within reach. Telemetry maintained. Safety precautions maintained. Pt free of falls or injuries. No further issues or concerns at this time. Plan of care continues.  "

## 2023-11-24 NOTE — PLAN OF CARE
Problem: Infection  Goal: Absence of Infection Signs and Symptoms  Outcome: Ongoing, Progressing     Problem: Adult Inpatient Plan of Care  Goal: Plan of Care Review  Outcome: Ongoing, Progressing  Goal: Patient-Specific Goal (Individualized)  Outcome: Ongoing, Progressing  Goal: Absence of Hospital-Acquired Illness or Injury  Outcome: Ongoing, Progressing  Goal: Optimal Comfort and Wellbeing  Outcome: Ongoing, Progressing  Goal: Readiness for Transition of Care  Outcome: Ongoing, Progressing     Problem: Bariatric Environmental Safety  Goal: Safety Maintained with Care  Outcome: Ongoing, Progressing     Problem: Diabetes Comorbidity  Goal: Blood Glucose Level Within Targeted Range  Outcome: Ongoing, Progressing     Problem: Impaired Wound Healing  Goal: Optimal Wound Healing  Outcome: Ongoing, Progressing     Problem: Skin Injury Risk Increased  Goal: Skin Health and Integrity  Outcome: Ongoing, Progressing

## 2023-11-24 NOTE — NURSING
Nurses Note -- 4 Eyes      11/24/2023   1:56 PM      Skin assessed during: Admit      [] No Altered Skin Integrity Present    []Prevention Measures Documented      [x] Yes- Altered Skin Integrity Present or Discovered   [x] LDA Added if Not in Epic (Describe Wound)   [x] New Altered Skin Integrity was Present on Admit and Documented in LDA   [x] Wound Image Taken    Wound Care Consulted? Yes    Attending Nurse:  Gracie Moncada RN/Staff Member:

## 2023-11-24 NOTE — ASSESSMENT & PLAN NOTE
- Afebrile but with leukopenia  - UA infectious  - CXR with pulmonary edema and   - trop elevated and with possible new LBBB on EKG  - CT head initially concerning for possible hemorrhage, but repeat showed no acute process  - VBG with CO2 52.8 but ph 7.43  - TSH wnl   - ddx: UTI infection, CHF, dehydration, polypharmacy, worsening dementia, delirium, CVA  - treat UTI and CHF. Repeat head imaging if clinically warranted  - Neuro checks q4h  - Delirium precautions

## 2023-11-24 NOTE — ED PROVIDER NOTES
Source of History  EMR, EMS, and NH records    Chief Complaint    Altered Mental Status (Pt sent from [facility] for AMS. Pt oriented to self only, hx of dementia. Per EMS staff does not know baseline. Also report foul smelling urine and SOB. )      History of Present Illness    Dania Her is a 83 y.o. female presenting with worsening mental status over the course of the last day.  EMS knows this patient from frequent visits.  She is at [facility].    At baseline she is typically oriented to self and others, but otherwise confused.  Today oriented only to self.  Did not recognize EMS staff.  No fever at facility, but they reported foul-smelling urine and dyspnea of unclear etiology.  No reported falls.  She tells me she had a fall a couple of weeks ago and has pain in the left shoulder.    Past medical history includes diabetes, hypertension, atrial fibrillation with long-term use of anticoagulation, chronic pain of both shoulders and ESBL urinary tract infections.  She is on Eliquis.      History is limited due to AMS.      Review of Systems    As per HPI and below:  Review of Systems:    Pertinent information obtained as above.  Otherwise limited due to: change in mental status and dementia     Past History    As per HPI and below:  Past Medical History:   Diagnosis Date    Arthritis     Dementia     Diabetes mellitus     Hypertension        Past Surgical History:   Procedure Laterality Date     SECTION      OOPHORECTOMY         Social History     Socioeconomic History    Marital status:    Tobacco Use    Smoking status: Never     Social Determinants of Health     Financial Resource Strain: Low Risk  (11/3/2023)    Overall Financial Resource Strain (CARDIA)     Difficulty of Paying Living Expenses: Not hard at all   Food Insecurity: No Food Insecurity (11/3/2023)    Hunger Vital Sign     Worried About Running Out of Food in the Last Year: Never true     Ran Out of Food in  the Last Year: Never true   Transportation Needs: No Transportation Needs (11/3/2023)    PRAPARE - Transportation     Lack of Transportation (Medical): No     Lack of Transportation (Non-Medical): No   Physical Activity: Inactive (2/15/2023)    Exercise Vital Sign     Days of Exercise per Week: 0 days     Minutes of Exercise per Session: 0 min   Stress: No Stress Concern Present (11/3/2023)    [employer] - Occupational Stress Questionnaire     Feeling of Stress : Only a little   Social Connections: Moderately Isolated (11/3/2023)    Social Connection and Isolation Panel [NHANES]     Frequency of Communication with Friends and Family: More than three times a week     Frequency of Social Gatherings with Friends and Family: More than three times a week     Attends Caodaism Services: Never     Active Member of Clubs or Organizations: No     Attends Club or Organization Meetings: Never     Marital Status:    Housing Stability: Low Risk  (11/3/2023)    Housing Stability Vital Sign     Unable to Pay for Housing in the Last Year: No     Number of Places Lived in the Last Year: 1     Unstable Housing in the Last Year: No       No family history on file.    Review of patient's allergies indicates:   Allergen Reactions    Glimepiride        No current facility-administered medications on file prior to encounter.     Current Outpatient Medications on File Prior to Encounter   Medication Sig Dispense Refill    albuterol (ACCUNEB) 0.63 mg/3 mL Nebu Take 0.63 mg by nebulization every 6 (six) hours as needed. Rescue      anastrozole (ARIMIDEX) 1 mg Tab Take 1 mg by mouth once daily.      bisoprolol (ZEBETA) 10 MG tablet Take 1 tablet by mouth once daily.      cholecalciferol, vitamin D3, 1,250 mcg (50,000 unit) capsule Take 50,000 Units by mouth every 30 days.      diphenhydrAMINE (BENADRYL) 25 mg capsule Take 25 mg by mouth nightly as needed for Itching or Insomnia.      donepeziL (ARICEPT) 5  MG tablet Take 1 tablet by mouth every evening.      ELIQUIS 5 mg Tab Take 5 mg by mouth 2 (two) times daily.      empagliflozin (JARDIANCE) 10 mg tablet Take 1 tablet by mouth once daily.      escitalopram oxalate (LEXAPRO) 10 MG tablet Take 10 mg by mouth once daily.      furosemide (LASIX) 20 MG tablet Take 20 mg by mouth 2 (two) times daily.      glyBURIDE (DIABETA) 5 MG tablet Take 5 mg by mouth every morning.      melatonin (MELATIN) 5 mg Take 1 tablet by mouth every evening.      nystatin (MYCOSTATIN) powder Apply 1 spray topically 2 (two) times a day.      pantoprazole (PROTONIX) 40 MG tablet Take 1 tablet (40 mg total) by mouth once daily. 30 tablet 11    polyethylene glycol (GLYCOLAX) 17 gram/dose powder Take 17 g by mouth daily as needed.      QUEtiapine (SEROQUEL) 25 MG Tab Take 25 mg by mouth every evening.      senna-docusate 8.6-50 mg (PERICOLACE) 8.6-50 mg per tablet Take 1 tablet by mouth once daily.      sodium chloride 0.9 % PgBk 100 mL with meropenem 1 gram SolR 1 g Inject 1 g into the vein every 12 (twelve) hours.      spironolactone (ALDACTONE) 25 MG tablet Take 25 mg by mouth once daily.         Physical Exam    Reviewed nursing notes.  Vitals:    11/24/23 0230 11/24/23 0233 11/24/23 0300 11/24/23 0315   BP:  135/78  132/75   BP Location:    Right arm   Patient Position:    Lying   Pulse: 70   69   Resp:    20   Temp:    98.1 °F (36.7 °C)   TempSrc:   Oral    SpO2: (!) 93%  95% (!) 94%     General:  Alert, no acute distress.    Skin:  Warm, dry, erythematous under axilla and R breast with fungal infection likely (on nystatin)  Head:  Normocephalic, atraumatic.    Neck:  Supple.   HEENT:  Pupils are equal and round, appropriate for room, extraocular movements are intact.  Normal phonation.  Dry mucous membranes.  Cardiovascular:  Regular rate and rhythm, Normal peripheral perfusion, No edema.  Chest wall with left-sided mastectomy.  Respiratory:  Lungs are clear to auscultation (although  occasional dry cough), respirations are slightly labored, breath sounds are equal.    Gastrointestinal:  Soft, Nontender, Non distended.   Back:  Nontender.   Musculoskeletal:  Normal range of motion observed, although tender at the left shoulder with no obvious deformities/ecchymosis/edema  Neurological:  Alert and oriented to person but confused to place, time, situation, and others around her.  Psychiatric:  Cooperative, but inappropriate    Initial MDM and Data Review    83 y.o. female presenting for evaluation of in mental status, history of dementia, but worsening orientation status today, given that she is only oriented to self.    Differential includes:  Delirium, worsening dementia, infection, metabolic encephalopathy, traumatic injury, ICH, electrolyte disturbances, dehydration, UTI    Work-up includes:  Chest x-ray, CT head, left shoulder x-ray, CBC, CMP, troponin, BNP, VBG, urinalysis    Interventions include:  Cardiac monitoring and observation    The patient has significant medical comorbidities that influence decision making for this acute process, such as:  Hypertension, diabetes, AFib on chronic anticoagulation, E coli ESBL    I decided to obtain the patient's medical records and review relevant documentation from hospital records.  Pertinent information is noted.  Most recent microscopy from urinalysis was positive for ESBL    Medications   furosemide injection 40 mg (40 mg Intravenous Given 11/24/23 0319)   QUEtiapine tablet 25 mg (25 mg Oral Given 11/24/23 0325)       Results and ED Course    Labs Reviewed   CBC W/ AUTO DIFFERENTIAL - Abnormal; Notable for the following components:       Result Value    WBC 3.53 (*)      (*)     MCH 34.3 (*)     RDW 18.1 (*)     Platelets 45 (*)     Immature Granulocytes 0.8 (*)     Mono # 0.2 (*)     nRBC 1 (*)     All other components within normal limits    Narrative:     Release to patient->Immediate   COMPREHENSIVE METABOLIC PANEL - Abnormal; Notable  for the following components:    Glucose 119 (*)     Calcium 10.8 (*)     Albumin 3.3 (*)     Total Bilirubin 4.3 (*)     ALT 9 (*)     All other components within normal limits    Narrative:     Release to patient->Immediate   TROPONIN I - Abnormal; Notable for the following components:    Troponin I 0.081 (*)     All other components within normal limits    Narrative:     Release to patient->Immediate   URINALYSIS, REFLEX TO URINE CULTURE - Abnormal; Notable for the following components:    Glucose, UA 3+ (*)     Occult Blood UA 1+ (*)     Leukocytes, UA Trace (*)     All other components within normal limits    Narrative:     Specimen Source->Urine   B-TYPE NATRIURETIC PEPTIDE - Abnormal; Notable for the following components:     (*)     All other components within normal limits   URINALYSIS MICROSCOPIC - Abnormal; Notable for the following components:    WBC, UA 17 (*)     All other components within normal limits    Narrative:     Specimen Source->Urine   ISTAT PROCEDURE - Abnormal; Notable for the following components:    POC PCO2 52.8 (*)     POC HCO3 35.2 (*)     POC BE 11 (*)     POC TCO2 37 (*)     All other components within normal limits   POCT GLUCOSE - Abnormal; Notable for the following components:    POCT Glucose 117 (*)     All other components within normal limits   CULTURE, URINE   HIV 1 / 2 ANTIBODY    Narrative:     Release to patient->Immediate   HEPATITIS C ANTIBODY    Narrative:     Release to patient->Immediate   LACTIC ACID, PLASMA    Narrative:     Release to patient->Immediate   LIPASE    Narrative:     Release to patient->Immediate   MAGNESIUM    Narrative:     Release to patient->Immediate   TSH    Narrative:     Release to patient->Immediate   PROTIME-INR   APTT   POCT GLUCOSE MONITORING CONTINUOUS       Imaging Results              CT Head Without Contrast (Final result)  Result time 11/24/23 04:45:05      Final result by Rhys Rodríguez MD (11/24/23 04:45:05)                    Impression:      Allowing for motion artifacts, there is no convincing CT evidence of acute intracranial hemorrhage.    Right maxillary sinusitis, incompletely visualized.      Electronically signed by: Rhys Rodríguez  Date:    11/24/2023  Time:    04:45               Narrative:    EXAMINATION:  CT HEAD WITHOUT CONTRAST    CLINICAL HISTORY:  Mental status change, unknown cause;    TECHNIQUE:  Low dose axial images were obtained through the head.  Coronal and sagittal reformations were also performed. Contrast was not administered.    COMPARISON:  Noncontrast head CT obtained approximately 2.5 hours earlier on the same date, 11/24/2023    FINDINGS:  There are again numerous motion artifacts.  Allowing for this, no definite acute intracranial hemorrhage is identified.    No discrete intracranial mass or mass effect, discrete acute large vascular territory brain infarct, or other acute intracranial abnormality.    There remains generalized cerebral and cerebellar parenchymal volume loss, with a proportionate degree of (presumed ex vacuo) enlargement of the ventricles, cisterns, sulci, and fissures.    There remains patchy parenchymal hypoattenuation in portions of the cerebral white matter; this is nonspecific but is favored to be related to chronic small vessel ischemia.    No depressed calvarial fracture.    Bilateral frontoparietal and temporal calvarial hyperostosis interna.    There remains mucosal thickening and osteoneogenesis in the right maxillary sinus, compatible with chronic sinusitis.                                        CT Head Without Contrast (Final result)  Result time 11/24/23 03:11:20      Final result by Rhys Rodríguez MD (11/24/23 03:11:20)                   Impression:      Motion-degraded scan.    Focal hyperdensity along the anterior left frontal lobe, which may be artifactual, though small focus of hemorrhage cannot be excluded.  Please see above for further discussion.  Further  evaluation with MRI or follow-up CT as clinically warranted.    Involutional changes and evidence of microvascular ischemic disease.    Chronic right maxillary sinusitis, likely odontogenic.    This report was flagged in Epic as abnormal.    Electronically signed by resident: Yohana Wang  Date:    11/24/2023  Time:    01:45    Electronically signed by: Rhys Rodríguez  Date:    11/24/2023  Time:    03:11               Narrative:    EXAMINATION:  CT HEAD WITHOUT CONTRAST    CLINICAL HISTORY:  Mental status change, unknown cause;    TECHNIQUE:  Low dose axial CT images obtained throughout the head without the use of intravenous contrast.  Axial, sagittal and coronal reconstructions were performed.    COMPARISON:  None.    FINDINGS:  CT head:    At the anterior paramedian left frontal lobe there is a small focal hyperdensity, which is favored to represent artifact related to calvarial hyperostosis with nearby osseous material, though parenchymal contusion or subdural hemorrhage may have a similar appearance, and cannot be excluded.    Mild generalized cerebral volume loss with compensatory prominence of the ventricles and sulci.  There is patchy hypoattenuation through the supratentorial white matter which is nonspecific but may represent chronic microvascular ischemic changes.  No attenuation abnormality to suggest recent or remote major vascular distribution infarct.    No definite extra-axial blood or fluid collections.  Please see above for further discussion.    No displaced calvarial fracture.  There is bilateral frontoparietal calvarial hyperostosis interna.    There is poor dentition.  There is a dental carry on the right which appears to communicate with a right maxillary sinus, concerning for odontogenic sinusitis.  The right maxillary sinus contains large volume of mixed density material (some of which is calcified, suggesting chronic infection).                                       CT Cervical Spine  Without Contrast (Final result)  Result time 11/24/23 02:55:39      Final result by Rhys Rodríguez MD (11/24/23 02:55:39)                   Impression:      No evidence of acute fracture or traumatic subluxation.    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    Multilevel spondylosis detailed above which results and moderate spinal canal stenosis at C6-C7 and areas of moderate neural foraminal narrowing from C4 through T1.    Electronically signed by resident: Yohana Wang  Date:    11/24/2023  Time:    01:58    Electronically signed by: Rhys Rodríguez  Date:    11/24/2023  Time:    02:55               Narrative:    EXAMINATION:  CT CERVICAL SPINE WITHOUT CONTRAST    CLINICAL HISTORY:  Neck pain, acute, no red flags;    TECHNIQUE:  Low dose axial images, sagittal and coronal reformations were performed though the cervical spine.  Contrast was not administered.    COMPARISON:  CTA 02/11/2023    FINDINGS:  Alignment: Straightening of cervical lordosis.    Vertebrae: No fracture.  No lytic or blastic lesion.    Discs: Multilevel disc height loss, most pronounced at C5-C6 and C6-C7.    C1-2: Dens is intact.  Pre-dens space is maintained.  There is a remote fracture of the posterior arch of C1 versus congenital incomplete C1. the right lateral mass of C1 is subluxed somewhat laterally relative to the right lateral mass of C2, possibly congenital in the setting of incomplete C1 posterior arch.    Skull base and craniocervical junction: Normal.    Degenerative findings:    Multiple levels demonstrate facet arthropathy and uncovertebral spurring.    C2-C3: No spinal canal stenosis or neural foraminal narrowing.    C3-C4: Mild bilateral neural foraminal narrowing.    C4-C5: Moderate right mild left neural foraminal narrowing.    C5-C6: Moderate bilateral neural foraminal narrowing.    C6-C7: Mild right moderate left neural foraminal  narrowing.  There is posterior disc osteophyte complex which contributes to moderate spinal canal stenosis.    C7-T1: Moderate bilateral neural foraminal narrowing.    Paraspinal muscles & soft tissues: Atrophy of the paraspinal musculature.  Intracranial and skull base findings dictated separately: See CT head 11/24/2023.  Trace intravenous gas in the right lower neck/upper chest.                                       X-Ray Shoulder Trauma Left (Final result)  Result time 11/24/23 01:22:25      Final result by Kian Ruvalcaba MD (11/24/23 01:22:25)                   Impression:      As above.      Electronically signed by: Kian Ruvalcaba MD  Date:    11/24/2023  Time:    01:22               Narrative:    EXAMINATION:  XR SHOULDER TRAUMA 3 VIEW LEFT    CLINICAL HISTORY:  Pain in arm, unspecified    TECHNIQUE:  Three views of the left shoulder were performed.    COMPARISON  None    FINDINGS:  There is diffuse osteopenia.  No grossly displaced fracture of the left humerus appreciated.  No evidence of dislocation.  There is advanced osteoarthrosis of the left glenohumeral joint with mild remodeling of the humeral head.  There is significant joint space narrowing with prominent subchondral cystic change and marginal osteophyte formation involving the glenoid and humeral head.  There is osteoarthrosis of the acromioclavicular joint.                                       X-Ray Chest AP Portable (Final result)  Result time 11/24/23 01:05:59      Final result by Kian Ruvalcaba MD (11/24/23 01:05:59)                   Impression:      As above.      Electronically signed by: Kina Ruvalcaba MD  Date:    11/24/2023  Time:    01:05               Narrative:    EXAMINATION:  XR CHEST AP PORTABLE    CLINICAL HISTORY:  ams;    TECHNIQUE:  Single frontal view of the chest was performed.    COMPARISON:  11/02/2023    FINDINGS:  Cardiac monitoring leads overlie the chest.  Implantable cardiac device projects over the  cardiac silhouette.  Cardiac silhouette is enlarged, similar to prior examination.  There is central vascular congestion and possible mild perihilar interstitial edema, similar to prior study.  There are scattered bandlike opacities suggesting atelectasis or scarring.  No new large confluent airspace consolidation identified.  No significant volume of pleural fluid or pneumothorax appreciated.  Osseous structures demonstrate degenerative changes.                                      ED Course as of 11/24/23 0501   Fri Nov 24, 2023   0159 Troponin I(!): 0.081 [AC]   0159 BNP(!): 586 [AC]   0258 CT Cervical Spine Without Contrast  No acute traumatic findings [AC]   0258 WBC(!): 3.53 [AC]   0258 Hemoglobin: 15.1 [AC]   0258 Platelet Count(!): 45  Thrombocytopenic at baseline [AC]   0307 CT Head Without Contrast  ? HyperdenseL frontal lobe, no contusion, no other signs of trauma  Awaiting staff read [AC]   0315 CT Head Without Contrast(!)  Reviewed final read, will repeat, most likely due to artifact but will repeat CT head [AC]   0449 CT Head Without Contrast  No evidence of hemorrhage.  Will admit to Hospital Medicine. [AC]      ED Course User Index  [AC] Jai Real, DO           EKG interpreted by myself    EKG  Performed: 0027  Rate/Rhythm/Axis: 70 bpm, atrial flutter, ventricular paced   ms  Qtc 537 ms  Impression:  Atrial flutter although ventricular pacemaker is present with LVH and a left bundle      Relevant imaging interpreted by myself  No fx  Mild interstitial edema  CTH without acute hemorrhage on repeat though artifact was present on first with concerning hyperdensity    Impression and Plan    83 y.o. female with findings of AMS with elevated troponin/BNP  based on the work up in the emergency department as above.    Important lab/imaging findings include: UA essentially equivocal, will hold on abx after discussion with HM given that she is afebrile, well appearing, no leukocytosis. ESBL on  last urine culture, sensitive to meropenem.    I consulted with: rads re CT, HM re abx and admission    All tests, treatment options and disposition options were considered given the patient's altered mental status.  The decision was made to admit the patient to the hospital.    The patient was admitted in stable condition for further evaluation.             Final diagnoses:  [R41.82] AMS (altered mental status)  [M79.603] Arm pain  [R79.89] Elevated troponin (Primary)  [R79.89] Elevated brain natriuretic peptide (BNP) level        ED Disposition Condition    Observation Stable                  Jai Rael, DO  11/24/23 0507

## 2023-11-24 NOTE — PT/OT/SLP EVAL
"Speech Language Pathology Evaluation  Bedside Swallow    Patient Name:  Dania Her   MRN:  [id number]  Admitting Diagnosis: Acute metabolic encephalopathy    Recommendations:                 General Recommendations:  Dysphagia therapy  Diet recommendations:  NPO, NPO   Aspiration Precautions: Strict aspiration precautions   General Precautions: Standard, aspiration  Communication strategies:  none    Assessment:     Dania Her is a 83 y.o. female with an SLP diagnosis of Dysphagia.  She presents with decreased participation in this evaluation.    History:     Past Medical History:   Diagnosis Date    Arthritis     Dementia     Diabetes mellitus     Hypertension        Past Surgical History:   Procedure Laterality Date     SECTION      OOPHORECTOMY       HPI: Dania Her is a 84 yo F with PMHx of HTN, T2DM, A fib (on eliquis), DVT, ICD, dementia, breast cancer s/p mastectomy, and ESBL UTI who presented to ED from [facility] for worsening mental status.On my interview, patient only oriented to self and complains of L shoulder pain. Unable to obtain any further history from patient.  Per ED provider: "At baseline she is typically oriented to self and others, but otherwise confused.  Today oriented only to self.  Did not recognize EMS staff. No fever at facility, but they reported foul-smelling urine and dyspnea of unclear etiology.  No reported falls.  She tells me she had a fall a couple of weeks ago and has pain in the left shoulder."     In ED: Afebrile. VSS. Satting 93-95%  on room air. WBC 3.53. Hgb 15.1. CMP with  and hypercalcemia. Corrected calcium 11.4. . Trop 0.081. lactic 1.5. TSH 1.415. UA appears infectious with WBC 17. Initial CTH with Focal hyperdensity along the anterior left frontal lobe, which may be artifactual, though small focus of hemorrhage cannot be excluded. Repeat CTH with no convincing acute hemorrhage. Chronic R maxillary sinusitis and chronic " microvascular ischemic changes noted. CT cervical spine with moderate spinal canal stenosis at C6-C7 and areas of moderate neural foraminal narrowing from C4 through T1. XR L shoulder with osteoarthritic changes and diffuse osteopenia, but no acute fracture or dislocation. CXR with There is central vascular congestion and possible mild perihilar interstitial edema, similar to prior study.  There are scattered bandlike opacities suggesting atelectasis or scarring.  No new large confluent airspace consolidation identified.  Given IV lasix 40 mg and po seroquel 25 mg. Placed in observation.       Prior Intubation HX:  none on file     Modified Barium Swallow: none on file    Chest X-Rays: FINDINGS:11/24  Cardiac monitoring leads overlie the chest.  Implantable cardiac device projects over the cardiac silhouette.  Cardiac silhouette is enlarged, similar to prior examination.  There is central vascular congestion and possible mild perihilar interstitial edema, similar to prior study.  There are scattered bandlike opacities suggesting atelectasis or scarring.  No new large confluent airspace consolidation identified.  No significant volume of pleural fluid or pneumothorax appreciated.  Osseous structures demonstrate degenerative changes.    Prior diet: unknown, pt is a poor hx and family was no present during this evaluation.        Subjective     Nursing clearing pt for SLP evaluation. Pt with no family present  Patient goals: none stated     Pain/Comfort:  Pain Rating 1: 0/10    Respiratory Status: Nasal cannula    Objective:     Oral Musculature Evaluation  Oral Musculature Comments: unable to assess 2/2 pt unable to participate.    Bedside Swallow Eval:   Consistencies Assessed:   Ice chips x1   Thin liquids x1 tsp      Oral Phase:   Unable to assess    Pharyngeal Phase:   Unable to assess    Compensatory Strategies  None    Treatment:  Pt keeping eyes closed throughout evaluation. Attempted to rouse pt with max multi  "modality cues, however pt remaining with eyes closed and consistently nodding head "no". Pt with no vocalizations this session despite max multi modality cues. Ice chip and tsp water trial attempted, however pt with increased oral defensiveness. Session terminated 2/2 to the above. Whiteboard kept current.SLP will continue to assess.     Goals:   Multidisciplinary Problems       SLP Goals          Problem: SLP    Goal Priority Disciplines Outcome   SLP Goal     SLP    Description: Speech Language pathology Goals:   1. Pt will tolerate PO trials to help guide SLP POC.                        Plan:     Patient to be seen:  4 x/week   Plan of Care expires:   12/08  Plan of Care reviewed with:  patient   SLP Follow-Up:  Yes       Discharge recommendations:    tbd  Barriers to Discharge:  Level of Skilled Assistance Needed     Time Tracking:     SLP Treatment Date:   11/24/23  Speech Start Time:  1300  Speech Stop Time:  1310     Speech Total Time (min):  10 min    Billable Minutes: Eval Swallow and Oral Function 10    11/24/2023         "

## 2023-11-25 LAB
ALBUMIN SERPL BCP-MCNC: 2.8 G/DL (ref 3.5–5.2)
ALP SERPL-CCNC: 93 U/L (ref 55–135)
ALT SERPL W/O P-5'-P-CCNC: 9 U/L (ref 10–44)
ANION GAP SERPL CALC-SCNC: 11 MMOL/L (ref 8–16)
AST SERPL-CCNC: 21 U/L (ref 10–40)
BACTERIA UR CULT: NO GROWTH
BILIRUB SERPL-MCNC: 3.9 MG/DL (ref 0.1–1)
BUN SERPL-MCNC: 9 MG/DL (ref 8–23)
CALCIUM SERPL-MCNC: 10.5 MG/DL (ref 8.7–10.5)
CHLORIDE SERPL-SCNC: 105 MMOL/L (ref 95–110)
CO2 SERPL-SCNC: 29 MMOL/L (ref 23–29)
CREAT SERPL-MCNC: 0.6 MG/DL (ref 0.5–1.4)
ERYTHROCYTE [DISTWIDTH] IN BLOOD BY AUTOMATED COUNT: 17.7 % (ref 11.5–14.5)
EST. GFR  (NO RACE VARIABLE): >60 ML/MIN/1.73 M^2
GLUCOSE SERPL-MCNC: 48 MG/DL (ref 70–110)
HCT VFR BLD AUTO: 43.1 % (ref 37–48.5)
HGB BLD-MCNC: 13.6 G/DL (ref 12–16)
MAGNESIUM SERPL-MCNC: 1.8 MG/DL (ref 1.6–2.6)
MCH RBC QN AUTO: 34.6 PG (ref 27–31)
MCHC RBC AUTO-ENTMCNC: 31.6 G/DL (ref 32–36)
MCV RBC AUTO: 110 FL (ref 82–98)
PLATELET # BLD AUTO: 44 K/UL (ref 150–450)
PMV BLD AUTO: 12.3 FL (ref 9.2–12.9)
POCT GLUCOSE: 116 MG/DL (ref 70–110)
POCT GLUCOSE: 120 MG/DL (ref 70–110)
POCT GLUCOSE: 51 MG/DL (ref 70–110)
POCT GLUCOSE: 99 MG/DL (ref 70–110)
POTASSIUM SERPL-SCNC: 3.9 MMOL/L (ref 3.5–5.1)
PROT SERPL-MCNC: 7.1 G/DL (ref 6–8.4)
RBC # BLD AUTO: 3.93 M/UL (ref 4–5.4)
SODIUM SERPL-SCNC: 145 MMOL/L (ref 136–145)
WBC # BLD AUTO: 2.89 K/UL (ref 3.9–12.7)

## 2023-11-25 PROCEDURE — 85027 COMPLETE CBC AUTOMATED: CPT

## 2023-11-25 PROCEDURE — 96372 THER/PROPH/DIAG INJ SC/IM: CPT

## 2023-11-25 PROCEDURE — 63600175 PHARM REV CODE 636 W HCPCS

## 2023-11-25 PROCEDURE — 36415 COLL VENOUS BLD VENIPUNCTURE: CPT

## 2023-11-25 PROCEDURE — 99499 UNLISTED E&M SERVICE: CPT | Mod: ,,, | Performed by: STUDENT IN AN ORGANIZED HEALTH CARE EDUCATION/TRAINING PROGRAM

## 2023-11-25 PROCEDURE — 99205 OFFICE O/P NEW HI 60 MIN: CPT | Mod: GC,,, | Performed by: INTERNAL MEDICINE

## 2023-11-25 PROCEDURE — 96376 TX/PRO/DX INJ SAME DRUG ADON: CPT

## 2023-11-25 PROCEDURE — 83735 ASSAY OF MAGNESIUM: CPT

## 2023-11-25 PROCEDURE — 94761 N-INVAS EAR/PLS OXIMETRY MLT: CPT

## 2023-11-25 PROCEDURE — G0378 HOSPITAL OBSERVATION PER HR: HCPCS

## 2023-11-25 PROCEDURE — 99900035 HC TECH TIME PER 15 MIN (STAT)

## 2023-11-25 PROCEDURE — 27000190 HC CPAP FULL FACE MASK W/VALVE

## 2023-11-25 PROCEDURE — 63700000 PHARM REV CODE 250 ALT 637 W/O HCPCS

## 2023-11-25 PROCEDURE — 99205 PR OFFICE/OUTPT VISIT, NEW, LEVL V, 60-74 MIN: ICD-10-PCS | Mod: GC,,, | Performed by: INTERNAL MEDICINE

## 2023-11-25 PROCEDURE — 80053 COMPREHEN METABOLIC PANEL: CPT

## 2023-11-25 PROCEDURE — 25000003 PHARM REV CODE 250: Performed by: HOSPITALIST

## 2023-11-25 PROCEDURE — 27100171 HC OXYGEN HIGH FLOW UP TO 24 HOURS

## 2023-11-25 PROCEDURE — 94660 CPAP INITIATION&MGMT: CPT

## 2023-11-25 PROCEDURE — 25000003 PHARM REV CODE 250

## 2023-11-25 PROCEDURE — 99499 NO LOS: ICD-10-PCS | Mod: ,,, | Performed by: STUDENT IN AN ORGANIZED HEALTH CARE EDUCATION/TRAINING PROGRAM

## 2023-11-25 PROCEDURE — 96366 THER/PROPH/DIAG IV INF ADDON: CPT

## 2023-11-25 PROCEDURE — 51798 US URINE CAPACITY MEASURE: CPT

## 2023-11-25 RX ORDER — FUROSEMIDE 20 MG/1
20 TABLET ORAL 2 TIMES DAILY
Status: DISCONTINUED | OUTPATIENT
Start: 2023-11-25 | End: 2023-11-26 | Stop reason: HOSPADM

## 2023-11-25 RX ORDER — METOPROLOL SUCCINATE 100 MG/1
200 TABLET, EXTENDED RELEASE ORAL DAILY
Status: DISCONTINUED | OUTPATIENT
Start: 2023-11-25 | End: 2023-11-26 | Stop reason: HOSPADM

## 2023-11-25 RX ORDER — QUETIAPINE FUMARATE 25 MG/1
25 TABLET, FILM COATED ORAL NIGHTLY
Status: DISCONTINUED | OUTPATIENT
Start: 2023-11-25 | End: 2023-11-26 | Stop reason: HOSPADM

## 2023-11-25 RX ORDER — CEFPODOXIME PROXETIL 200 MG/1
200 TABLET, FILM COATED ORAL EVERY 12 HOURS
Status: DISCONTINUED | OUTPATIENT
Start: 2023-11-26 | End: 2023-11-26 | Stop reason: HOSPADM

## 2023-11-25 RX ORDER — AZITHROMYCIN 250 MG/1
500 TABLET, FILM COATED ORAL DAILY
Status: DISCONTINUED | OUTPATIENT
Start: 2023-11-25 | End: 2023-11-26 | Stop reason: HOSPADM

## 2023-11-25 RX ADMIN — Medication 6 MG: at 08:11

## 2023-11-25 RX ADMIN — MICONAZOLE NITRATE 2 % TOPICAL POWDER: at 09:11

## 2023-11-25 RX ADMIN — FUROSEMIDE 20 MG: 20 TABLET ORAL at 01:11

## 2023-11-25 RX ADMIN — ERTAPENEM 1 G: 1 INJECTION INTRAMUSCULAR; INTRAVENOUS at 09:11

## 2023-11-25 RX ADMIN — MICONAZOLE NITRATE 2 % TOPICAL POWDER: at 08:11

## 2023-11-25 RX ADMIN — SENNOSIDES AND DOCUSATE SODIUM 1 TABLET: 8.6; 5 TABLET ORAL at 08:11

## 2023-11-25 RX ADMIN — METOPROLOL SUCCINATE 200 MG: 100 TABLET, EXTENDED RELEASE ORAL at 01:11

## 2023-11-25 RX ADMIN — ACETAMINOPHEN 1000 MG: 500 TABLET ORAL at 08:11

## 2023-11-25 RX ADMIN — ENOXAPARIN SODIUM 120 MG: 120 INJECTION SUBCUTANEOUS at 08:11

## 2023-11-25 RX ADMIN — Medication 16 G: at 06:11

## 2023-11-25 RX ADMIN — DONEPEZIL HYDROCHLORIDE 5 MG: 5 TABLET, FILM COATED ORAL at 08:11

## 2023-11-25 RX ADMIN — ALUMINUM HYDROXIDE, MAGNESIUM HYDROXIDE, AND SIMETHICONE 30 ML: 200; 200; 20 SUSPENSION ORAL at 09:11

## 2023-11-25 RX ADMIN — PANTOPRAZOLE SODIUM 40 MG: 40 TABLET, DELAYED RELEASE ORAL at 08:11

## 2023-11-25 RX ADMIN — ANASTROZOLE 1 MG: 1 TABLET, COATED ORAL at 09:11

## 2023-11-25 RX ADMIN — QUETIAPINE FUMARATE 25 MG: 25 TABLET ORAL at 08:11

## 2023-11-25 RX ADMIN — SPIRONOLACTONE 25 MG: 25 TABLET ORAL at 08:11

## 2023-11-25 RX ADMIN — AZITHROMYCIN 500 MG: 250 TABLET, FILM COATED ORAL at 04:11

## 2023-11-25 RX ADMIN — DEXTROSE MONOHYDRATE 125 ML: 100 INJECTION, SOLUTION INTRAVENOUS at 06:11

## 2023-11-25 RX ADMIN — METOROPROLOL TARTRATE 2.5 MG: 5 INJECTION, SOLUTION INTRAVENOUS at 06:11

## 2023-11-25 RX ADMIN — APIXABAN 5 MG: 5 TABLET, FILM COATED ORAL at 08:11

## 2023-11-25 NOTE — ASSESSMENT & PLAN NOTE
- BNP in 500s; lower than usual when patient presents for acute HF exacerbation. TTE w/ CVP 15 but clinically appears to be euvolemic. Will resume home lasix BID

## 2023-11-25 NOTE — HPI
83F with h/o dementia (oriented to self at baseline), HTN, DM2, Afib (On eliquis), DVT, ICD, breast cancer s/p mastectomy, and recent hosp admissions for pt debility and odd behavior / AMS. Last hospital admission (11/02), urine noted to be cloudy and foul smelling; so obtained urine sample. UA wbc 50, Ucx ESBL-E.coli; no discrete s/sxs of UTI, though pt poor historian and at that time ultimately received course of merrem inpatient.   -- Pt now presents (11/24) from [facility] for worsening mental status, with foul-smelling urine and SOB w/o cough. Pt also c/o L shoulder pain s/p fall 2-3wks ago. On arrival, pt afebrile, VSS, Hds, wbc nml, Hg 15, . Trop 0.081. lactic 1.5.     UA showed WBC 17, rare bacteria; Ucx NG. Bld cxs NGTD. Pt started on empiric Iv-Erta pending infectious work-up and ID consult for c/f UTI.     CXR unrevealing; Chest-CT showed: Focus of consolidation within the periphery of the left lower lobe, new since the previous exam concerning for developing infection. Given IV lasix 40 mg and po seroquel 25 mg; maintained on IV-Erta and Placed in observation.

## 2023-11-25 NOTE — HOSPITAL COURSE
Initial concern for acute on chronic HF given patient's elevated BNP. Upon further chart review, it appears BNP is usually 800-1000 when patient is in an acute exacerbation. Dry mucous membranes on examination. Lasix stopped. Ertapenem started for concern of ESBL UTI. Of note, TTE w/ CVP 15 but there was mention of 'RV dilation w/ decreased function' w/ concern for 'pHTN vs pulmonary disease vs PE'. D-dimer 5.42. STAT CTA chest ruled out PE but did show LLL consolidation and peribronchial infection. Worsening somnolence and hypercapnia; MICU consulted. Ultimately, patient transferred to stepdown floor to initiate BiPAP QHS. Mental status has improved; back to baseline. Urine culture w/ no growth. Patient is bedbound at baseline. [place] [facility] [place] but daughter would like to take her home w/ HH. Will discharge home w/ cefpodoxime and azithromycin to complete CAP treatment. Stable at time of discharge. PCP follow up.

## 2023-11-25 NOTE — SUBJECTIVE & OBJECTIVE
Past Medical History:   Diagnosis Date    Arthritis     Dementia     Diabetes mellitus     Hypertension        Past Surgical History:   Procedure Laterality Date     SECTION      OOPHORECTOMY         Review of patient's allergies indicates:   Allergen Reactions    Glimepiride        Family History    None       Tobacco Use    Smoking status: Never    Smokeless tobacco: Not on file   Substance and Sexual Activity    Alcohol use: Not on file    Drug use: Not on file    Sexual activity: Not on file      Review of Systems   Unable to perform ROS: Dementia     Objective:     Vital Signs (Most Recent):  Temp: 97.1 °F (36.2 °C) (23 1620)  Pulse: 71 (23 1620)  Resp: 17 (23 1620)  BP: 98/60 (23 1620)  SpO2: 97 % (23 1620) Vital Signs (24h Range):  Temp:  [97.1 °F (36.2 °C)-98.8 °F (37.1 °C)] 97.1 °F (36.2 °C)  Pulse:  [69-75] 71  Resp:  [16-22] 17  SpO2:  [91 %-99 %] 97 %  BP: ()/(60-97) 98/60   Weight: 112.5 kg (248 lb 0.3 oz)  Body mass index is 41.27 kg/m².    No intake or output data in the 24 hours ending 23       Physical Exam  Vitals and nursing note reviewed.   Constitutional:       General: She is not in acute distress.     Appearance: She is obese. She is ill-appearing. She is not toxic-appearing.      Comments: Somnolent but arousable to voice. Can answer simple yes no questions, but falls asleep soon after.   HENT:      Head: Normocephalic and atraumatic.      Mouth/Throat:      Mouth: Mucous membranes are dry.      Pharynx: Oropharynx is clear.   Eyes:      Conjunctiva/sclera: Conjunctivae normal.   Cardiovascular:      Rate and Rhythm: Normal rate and regular rhythm.      Heart sounds: No murmur heard.  Pulmonary:      Effort: No respiratory distress.      Comments: Decreased breath sounds bilaterally, no obvious wheeze or crackles  Chest:      Chest wall: No tenderness.   Abdominal:      General: There is no distension.      Palpations: Abdomen is soft.       Tenderness: There is no abdominal tenderness.   Musculoskeletal:      Comments: Non pitting edema BLE, chronic venous stasis changes   Skin:     General: Skin is warm and dry.   Neurological:      Mental Status: She is disoriented.            Vents:     Lines/Drains/Airways       Drain  Duration             Female External Urinary Catheter 11/24/23 0945 <1 day              Peripheral Intravenous Line  Duration                  Peripheral IV - Single Lumen 11/24/23 0600 20 G Anterior;Right Upper Arm <1 day                  Significant Labs:    CBC/Anemia Profile:  Recent Labs   Lab 11/24/23  0123 11/24/23  1052 11/24/23  1526   WBC 3.53*  --   --    HGB 15.1  --   --    HCT 46.5  --  43   PLT 45*  --   --    *  --   --    RDW 18.1*  --   --    FOLATE  --  13.8  --    [id number]  --  1671*  --         Chemistries:  Recent Labs   Lab 11/24/23  0123      K 4.0      CO2 23   BUN 10   CREATININE 0.7   CALCIUM 10.8*   ALBUMIN 3.3*   PROT 8.0   BILITOT 4.3*   ALKPHOS 118   ALT 9*   AST 16   MG 1.9       All pertinent labs within the past 24 hours have been reviewed.    Significant Imaging: I have reviewed all pertinent imaging results/findings within the past 24 hours.

## 2023-11-25 NOTE — CONSULTS
[facility] - Cardiology Stepdown  Infectious Disease  Consult Note    Patient Name: Dania Her  MRN: [id number]  Admission Date: 11/24/2023  Hospital Length of Stay: 0 days  Attending Physician: Eunice Bear MD  Primary Care Provider: [facility] -     Isolation Status: No active isolations    Patient information was obtained from patient, relative(s), and ER records.      Inpatient consult to Infectious Diseases  Consult performed by: Shakeel Ramon PA-C  Consult ordered by: Anna Mcintosh MD        Assessment/Plan:     Neuro  Acute metabolic encephalopathy  I have reviewed hospital notes from   service and other specialty providers. I have also reviewed CBC, CMP/BMP,  cultures and imaging with my interpretation as documented.      83F with h/o dementia (oriented to self at baseline), HTN, DM2, Afib (On eliquis), DVT, ICD, breast cancer s/p mastectomy, and recent hosp admissions for pt debility and odd behavior / AMS. Last hospital admission (11/02), Ucx ESBL-E.coli and received course of merrem inpatient for potential clinical benefit.  -- Pt now presents (11/24) from [facility] for similar complaints of worsening mental status, with foul-smelling urine and SOB w/o cough. Pt also c/o L shoulder pain s/p fall 2-3wks ago. On arrival, pt afebrile, VSS, Hds, wbc nml, Hg 15, . Trop 0.081. lactic 1.5.     UA showed WBC 17, rare bacteria; Ucx No Growth. Bld cxs NGTD. Pt started on empiric Iv-Erta (11/24) pending infectious work-up and ID consult for c/f UTI.     CXR unrevealing; Chest-CT showed: Focus of consolidation within the periphery of the left lower lobe, new since the previous exam concerning for developing infection. Given IV lasix 40 mg and po seroquel 25 mg; Placed in observation. Remains stable, no s/sxs of systemic infectious process.     Recommendations / Plan:  Continue Iv-Ertapenem for 5d course, JEFFERY 11/28 -- for c/f new developing PNA.    -- Pt may f/u with ID as  needed.  -- Discussed with ID staff and primary team.  -- ID will sign off at this time. Please see OPAT note below for outpt abx plans.       Outpatient Antibiotic Therapy Plan:    Please send referral to [facility].    1) Infection :   PNA    2) Discharge Antibiotics:     Intravenous antibiotics:  IV - Ertapenem 1g Q24h    3) Therapy Duration:  5d    Estimated end date of IV antibiotics:  11/28    4) Outpatient Weekly Labs:  at end of abx therapy, obtain CRP, CBC, and CMP.    5) Fax Lab Results to Infectious Diseases Provider:  Shakeel Ramon PA-C    [facility] Fax Number: [phone]    6) Outpatient Infectious Diseases Follow-up    Follow-up appointment will be arranged by the ID clinic and will be found in the patient's appointments tab.    Prior to discharge, please ensure the patient's follow-up appt has been scheduled with ID-Clinic -- for patient convenience and continuity of care.  If there is still no follow-up scheduled prior to discharge, please send an EPIC message to  Nusrat Garza LPN  in Infectious Diseases.                Thank you for your consult. I will sign off. Please contact us if you have any additional questions.    Shakeel Ramon PA-C  Infectious Disease  [facility] - Cardiology Stepdown    Subjective:     Principal Problem: <principal problem not specified>    HPI: 83F with h/o dementia (oriented to self at baseline), HTN, DM2, Afib (On eliquis), DVT, ICD, breast cancer s/p mastectomy, and recent hosp admissions for pt debility and odd behavior / AMS. Last hospital admission (11/02), urine noted to be cloudy and foul smelling; so obtained urine sample. UA wbc 50, Ucx ESBL-E.coli; no discrete s/sxs of UTI, though pt poor historian and at that time ultimately received course of merrem inpatient.   -- Pt now presents (11/24) from [facility] for worsening mental status, with foul-smelling urine and SOB w/o cough. Pt also c/o L shoulder  pain s/p fall 2-3wks ago. On arrival, pt afebrile, VSS, Hds, wbc nml, Hg 15, . Trop 0.081. lactic 1.5.     UA showed WBC 17, rare bacteria; Ucx NG. Bld cxs NGTD. Pt started on empiric Iv-Erta pending infectious work-up and ID consult for c/f UTI.     CXR unrevealing; Chest-CT showed: Focus of consolidation within the periphery of the left lower lobe, new since the previous exam concerning for developing infection. Given IV lasix 40 mg and po seroquel 25 mg; maintained on IV-Erta and Placed in observation.     Past Medical History:   Diagnosis Date    Arthritis     Dementia     Diabetes mellitus     Hypertension        Past Surgical History:   Procedure Laterality Date     SECTION      OOPHORECTOMY         Review of patient's allergies indicates:   Allergen Reactions    Glimepiride        Medications:  Medications Prior to Admission   Medication Sig    albuterol (ACCUNEB) 0.63 mg/3 mL Nebu Take 0.63 mg by nebulization every 6 (six) hours as needed. Rescue    anastrozole (ARIMIDEX) 1 mg Tab Take 1 mg by mouth once daily.    bisoprolol (ZEBETA) 10 MG tablet Take 1 tablet by mouth once daily.    cholecalciferol, vitamin D3, 1,250 mcg (50,000 unit) capsule Take 50,000 Units by mouth every 30 days.    diphenhydrAMINE (BENADRYL) 50 MG capsule Take 50 mg by mouth nightly as needed for Itching or Insomnia.    donepeziL (ARICEPT) 5 MG tablet Take 1 tablet by mouth every evening.    ELIQUIS 5 mg Tab Take 5 mg by mouth 2 (two) times daily.    empagliflozin (JARDIANCE) 10 mg tablet Take 1 tablet by mouth once daily.    escitalopram oxalate (LEXAPRO) 10 MG tablet Take 10 mg by mouth once daily.    furosemide (LASIX) 20 MG tablet Take 20 mg by mouth 2 (two) times daily.    glyBURIDE (DIABETA) 5 MG tablet Take 5 mg by mouth every morning.    melatonin (MELATIN) 5 mg Take 1 tablet by mouth every evening.    nitrofurantoin, macrocrystal-monohydrate, (MACROBID) 100 MG capsule Take 100 mg by mouth 2 (two) times daily. X  7 days    nystatin (MYCOSTATIN) powder Apply topically 2 (two) times a day.    pantoprazole (PROTONIX) 40 MG tablet Take 1 tablet (40 mg total) by mouth once daily.    polyethylene glycol (GLYCOLAX) 17 gram/dose powder Take 17 g by mouth daily as needed for Constipation. (With 8 oz of water)    QUEtiapine (SEROQUEL) 25 MG Tab Take 25 mg by mouth every evening.    senna-docusate 8.6-50 mg (PERICOLACE) 8.6-50 mg per tablet Take 1 tablet by mouth once daily.    spironolactone (ALDACTONE) 25 MG tablet Take 25 mg by mouth once daily.    traMADoL (ULTRAM) 50 mg tablet Take 50 mg by mouth every 6 (six) hours as needed for Pain.    traZODone (DESYREL) 50 MG tablet Take 50 mg by mouth every evening.    sodium chloride 0.9 % PgBk 100 mL with meropenem 1 gram SolR 1 g Inject 1 g into the vein every 12 (twelve) hours.     Antibiotics (From admission, onward)      Start     Stop Route Frequency Ordered    11/24/23 0730  ertapenem (INVANZ) 1 g in sodium chloride 0.9 % 100 mL IVPB (MB+)         -- IV Every 24 hours (non-standard times) 11/24/23 0617          Antifungals (From admission, onward)      Start     Stop Route Frequency Ordered    11/24/23 0900  miconazole NITRATE 2 % top powder         -- Top 2 times daily 11/24/23 0649          Antivirals (From admission, onward)      None             Immunization History   Administered Date(s) Administered    PPD Test 02/19/2023, 11/03/2023       Family History    None       Social History     Socioeconomic History    Marital status:    Tobacco Use    Smoking status: Never     Social Determinants of Health     Financial Resource Strain: Low Risk  (11/3/2023)    Overall Financial Resource Strain (CARDIA)     Difficulty of Paying Living Expenses: Not hard at all   Food Insecurity: No Food Insecurity (11/3/2023)    Hunger Vital Sign     Worried About Running Out of Food in the Last Year: Never true     Ran Out of Food in the Last Year: Never true   Transportation Needs: No  Transportation Needs (11/3/2023)    PRAPARE - Transportation     Lack of Transportation (Medical): No     Lack of Transportation (Non-Medical): No   Physical Activity: Inactive (2/15/2023)    Exercise Vital Sign     Days of Exercise per Week: 0 days     Minutes of Exercise per Session: 0 min   Stress: No Stress Concern Present (11/3/2023)    [employer] [place] [employer] - Occupational Stress Questionnaire     Feeling of Stress : Only a little   Social Connections: Moderately Isolated (11/3/2023)    Social Connection and Isolation Panel [NHANES]     Frequency of Communication with Friends and Family: More than three times a week     Frequency of Social Gatherings with Friends and Family: More than three times a week     Attends Zoroastrianism Services: Never     Active Member of Clubs or Organizations: No     Attends Club or Organization Meetings: Never     Marital Status:    Housing Stability: Low Risk  (11/3/2023)    Housing Stability Vital Sign     Unable to Pay for Housing in the Last Year: No     Number of Places Lived in the Last Year: 1     Unstable Housing in the Last Year: No     Review of Systems   Unable to perform ROS: Dementia     Objective:     Vital Signs (Most Recent):  Temp: 98.4 °F (36.9 °C) (11/25/23 1124)  Pulse: 72 (11/25/23 1124)  Resp: 18 (11/25/23 1124)  BP: 112/65 (11/25/23 1124)  SpO2: 97 % (11/25/23 1124) Vital Signs (24h Range):  Temp:  [96.1 °F (35.6 °C)-98.7 °F (37.1 °C)] 98.4 °F (36.9 °C)  Pulse:  [69-82] 72  Resp:  [16-20] 18  SpO2:  [90 %-99 %] 97 %  BP: ()/(60-81) 112/65     Weight: 119.4 kg (263 lb 3.7 oz)  Body mass index is 43.8 kg/m².    Estimated Creatinine Clearance: 92 mL/min (based on SCr of 0.6 mg/dL).     Physical Exam  Vitals and nursing note reviewed.   Constitutional:       General: She is not in acute distress.     Appearance: She is ill-appearing (chronically). She is not toxic-appearing or diaphoretic.      Comments: BMI 43.8   HENT:      Nose: No  congestion.      Mouth/Throat:      Mouth: Mucous membranes are dry.   Eyes:      General: No scleral icterus.     Conjunctiva/sclera: Conjunctivae normal.   Cardiovascular:      Rate and Rhythm: Normal rate.      Heart sounds: Normal heart sounds. No murmur heard.  Pulmonary:      Effort: Pulmonary effort is normal. No respiratory distress.      Breath sounds: Normal breath sounds.      Comments: Reduced lung sounds lower lobes.   Abdominal:      General: Bowel sounds are normal. There is no distension.      Palpations: Abdomen is soft.      Tenderness: There is no abdominal tenderness. There is no right CVA tenderness, left CVA tenderness or guarding.   Musculoskeletal:         General: No swelling or tenderness.      Cervical back: No rigidity or tenderness.      Right lower leg: No edema.      Left lower leg: No edema.   Skin:     General: Skin is warm and dry.      Findings: No erythema or rash.      Comments: Chronic vascular skin changes LE b/L. Skin intact. No TTP.   Neurological:      Mental Status: She is alert. She is disoriented.      Comments: Oriented to self only (baseline).          Significant Labs: Blood Culture:   Recent Labs   Lab 11/02/23  2145 11/02/23  2200 11/24/23  1053   LABBLOO No growth after 5 days. No growth after 5 days. No Growth to date  No Growth to date  No Growth to date  No Growth to date     CBC:   Recent Labs   Lab 11/24/23  0123 11/24/23  1526 11/24/23  1802 11/25/23  0429   WBC 3.53*  --   --  2.89*   HGB 15.1  --   --  13.6   HCT 46.5 43 40 43.1   PLT 45*  --   --  44*     CMP:   Recent Labs   Lab 11/24/23  0123 11/25/23  0429    145   K 4.0 3.9    105   CO2 23 29   * 48*   BUN 10 9   CREATININE 0.7 0.6   CALCIUM 10.8* 10.5   PROT 8.0 7.1   ALBUMIN 3.3* 2.8*   BILITOT 4.3* 3.9*   ALKPHOS 118 93   AST 16 21   ALT 9* 9*   ANIONGAP 14 11     Microbiology Results (last 7 days)       Procedure Component Value Units Date/Time    Blood culture [[id number]]  Collected: 11/24/23 1053    Order Status: Completed Specimen: Blood from Peripheral, Hand, Right Updated: 11/25/23 1212     Blood Culture, Routine No Growth to date      No Growth to date    Blood culture [[id number]] Collected: 11/24/23 1053    Order Status: Completed Specimen: Blood from Peripheral, Upper Arm, Right Updated: 11/25/23 1212     Blood Culture, Routine No Growth to date      No Growth to date    Urine culture [[id number]] Collected: 11/24/23 0256    Order Status: No result Specimen: Urine Updated: 11/24/23 0353          Urine Culture:   Recent Labs   Lab 11/02/23 2111   LABURIN ESCHERICHIA COLI ESBL  >100,000 cfu/ml  *     Urine Studies:   Recent Labs   Lab 11/02/23 2111 11/24/23 0256   COLORU Yellow Yellow   APPEARANCEUA Clear Clear   PHUR 6.0 5.0   SPECGRAV 1.020 1.015   PROTEINUA Negative Negative   GLUCUA 4+* 3+*   KETONESU Negative Negative   BILIRUBINUA Negative Negative   OCCULTUA 1+* 1+*   NITRITE Positive* Negative   UROBILINOGEN Negative  --    LEUKOCYTESUR 2+* Trace*   RBCUA 2 2   WBCUA 51* 17*   BACTERIA Rare Rare   SQUAMEPITHEL 0 1     All pertinent labs within the past 24 hours have been reviewed.    Significant Imaging: I have reviewed all pertinent imaging results/findings within the past 24 hours.    I have personally reviewed records / hospital notes from   service and other specialty providers. I have also reviewed CBC, CMP/BMP,  cultures and imaging with my interpretation as documented in my assessment / plan.    Patient is high risk for infectious complications given pt's age, multiple co-morbidities, and case complexity.      Time: 75 minutes   50% of time spent on face-to-face counseling and coordination of care. Counseling included review of test results, diagnosis, and treatment plan with patient and/or family.

## 2023-11-25 NOTE — SUBJECTIVE & OBJECTIVE
Interval History:    Review of Systems   Unable to perform ROS: Dementia     Objective:     Vital Signs (Most Recent):  Temp: 98.7 °F (37.1 °C) (11/25/23 0756)  Pulse: 73 (11/25/23 0756)  Resp: 20 (11/25/23 0756)  BP: 109/65 (11/25/23 0756)  SpO2: 98 % (11/25/23 0756) Vital Signs (24h Range):  Temp:  [96.1 °F (35.6 °C)-98.8 °F (37.1 °C)] 98.7 °F (37.1 °C)  Pulse:  [69-82] 73  Resp:  [16-20] 20  SpO2:  [90 %-99 %] 98 %  BP: ()/(60-81) 109/65     Weight: 119.4 kg (263 lb 3.7 oz)  Body mass index is 43.8 kg/m².    Intake/Output Summary (Last 24 hours) at 11/25/2023 0904  Last data filed at 11/24/2023 2230  Gross per 24 hour   Intake 200 ml   Output --   Net 200 ml         Physical Exam  Constitutional:       Appearance: Normal appearance.   HENT:      Mouth/Throat:      Mouth: Mucous membranes are dry.      Pharynx: Oropharynx is clear.   Eyes:      Extraocular Movements: Extraocular movements intact.      Conjunctiva/sclera: Conjunctivae normal.   Cardiovascular:      Rate and Rhythm: Normal rate and regular rhythm.      Heart sounds: Normal heart sounds. No murmur heard.  Pulmonary:      Effort: No respiratory distress.      Breath sounds: Normal breath sounds.   Musculoskeletal:      Comments: Chronic bilateral LE skin changes    Skin:     General: Skin is warm and dry.   Neurological:      Mental Status: She is alert. She is disoriented.             Significant Labs: All pertinent labs within the past 24 hours have been reviewed.    Significant Imaging: I have reviewed all pertinent imaging results/findings within the past 24 hours.

## 2023-11-25 NOTE — NURSING
Attempted reported to nurse receiving room 315, asked to call back after assignment rearrangement. Pt with no distress noted.

## 2023-11-25 NOTE — SUBJECTIVE & OBJECTIVE
Past Medical History:   Diagnosis Date    Arthritis     Dementia     Diabetes mellitus     Hypertension        Past Surgical History:   Procedure Laterality Date     SECTION      OOPHORECTOMY         Review of patient's allergies indicates:   Allergen Reactions    Glimepiride        Medications:  Medications Prior to Admission   Medication Sig    albuterol (ACCUNEB) 0.63 mg/3 mL Nebu Take 0.63 mg by nebulization every 6 (six) hours as needed. Rescue    anastrozole (ARIMIDEX) 1 mg Tab Take 1 mg by mouth once daily.    bisoprolol (ZEBETA) 10 MG tablet Take 1 tablet by mouth once daily.    cholecalciferol, vitamin D3, 1,250 mcg (50,000 unit) capsule Take 50,000 Units by mouth every 30 days.    diphenhydrAMINE (BENADRYL) 50 MG capsule Take 50 mg by mouth nightly as needed for Itching or Insomnia.    donepeziL (ARICEPT) 5 MG tablet Take 1 tablet by mouth every evening.    ELIQUIS 5 mg Tab Take 5 mg by mouth 2 (two) times daily.    empagliflozin (JARDIANCE) 10 mg tablet Take 1 tablet by mouth once daily.    escitalopram oxalate (LEXAPRO) 10 MG tablet Take 10 mg by mouth once daily.    furosemide (LASIX) 20 MG tablet Take 20 mg by mouth 2 (two) times daily.    glyBURIDE (DIABETA) 5 MG tablet Take 5 mg by mouth every morning.    melatonin (MELATIN) 5 mg Take 1 tablet by mouth every evening.    nitrofurantoin, macrocrystal-monohydrate, (MACROBID) 100 MG capsule Take 100 mg by mouth 2 (two) times daily. X 7 days    nystatin (MYCOSTATIN) powder Apply topically 2 (two) times a day.    pantoprazole (PROTONIX) 40 MG tablet Take 1 tablet (40 mg total) by mouth once daily.    polyethylene glycol (GLYCOLAX) 17 gram/dose powder Take 17 g by mouth daily as needed for Constipation. (With 8 oz of water)    QUEtiapine (SEROQUEL) 25 MG Tab Take 25 mg by mouth every evening.    senna-docusate 8.6-50 mg (PERICOLACE) 8.6-50 mg per tablet Take 1 tablet by mouth once daily.    spironolactone (ALDACTONE) 25 MG tablet Take 25 mg by mouth  once daily.    traMADoL (ULTRAM) 50 mg tablet Take 50 mg by mouth every 6 (six) hours as needed for Pain.    traZODone (DESYREL) 50 MG tablet Take 50 mg by mouth every evening.    sodium chloride 0.9 % PgBk 100 mL with meropenem 1 gram SolR 1 g Inject 1 g into the vein every 12 (twelve) hours.     Antibiotics (From admission, onward)      Start     Stop Route Frequency Ordered    11/24/23 0730  ertapenem (INVANZ) 1 g in sodium chloride 0.9 % 100 mL IVPB (MB+)         -- IV Every 24 hours (non-standard times) 11/24/23 0617          Antifungals (From admission, onward)      Start     Stop Route Frequency Ordered    11/24/23 0900  miconazole NITRATE 2 % top powder         -- Top 2 times daily 11/24/23 0649          Antivirals (From admission, onward)      None             Immunization History   Administered Date(s) Administered    PPD Test 02/19/2023, 11/03/2023       Family History    None       Social History     Socioeconomic History    Marital status:    Tobacco Use    Smoking status: Never     Social Determinants of Health     Financial Resource Strain: Low Risk  (11/3/2023)    Overall Financial Resource Strain (CARDIA)     Difficulty of Paying Living Expenses: Not hard at all   Food Insecurity: No Food Insecurity (11/3/2023)    Hunger Vital Sign     Worried About Running Out of Food in the Last Year: Never true     Ran Out of Food in the Last Year: Never true   Transportation Needs: No Transportation Needs (11/3/2023)    PRAPARE - Transportation     Lack of Transportation (Medical): No     Lack of Transportation (Non-Medical): No   Physical Activity: Inactive (2/15/2023)    Exercise Vital Sign     Days of Exercise per Week: 0 days     Minutes of Exercise per Session: 0 min   Stress: No Stress Concern Present (11/3/2023)    [place] [employer] [place] [employer] - Occupational Stress Questionnaire     Feeling of Stress : Only a little   Social Connections: Moderately Isolated (11/3/2023)    Social  Connection and Isolation Panel [NHANES]     Frequency of Communication with Friends and Family: More than three times a week     Frequency of Social Gatherings with Friends and Family: More than three times a week     Attends Baptism Services: Never     Active Member of Clubs or Organizations: No     Attends Club or Organization Meetings: Never     Marital Status:    Housing Stability: Low Risk  (11/3/2023)    Housing Stability Vital Sign     Unable to Pay for Housing in the Last Year: No     Number of Places Lived in the Last Year: 1     Unstable Housing in the Last Year: No     Review of Systems   Unable to perform ROS: Dementia     Objective:     Vital Signs (Most Recent):  Temp: 98.4 °F (36.9 °C) (11/25/23 1124)  Pulse: 72 (11/25/23 1124)  Resp: 18 (11/25/23 1124)  BP: 112/65 (11/25/23 1124)  SpO2: 97 % (11/25/23 1124) Vital Signs (24h Range):  Temp:  [96.1 °F (35.6 °C)-98.7 °F (37.1 °C)] 98.4 °F (36.9 °C)  Pulse:  [69-82] 72  Resp:  [16-20] 18  SpO2:  [90 %-99 %] 97 %  BP: ()/(60-81) 112/65     Weight: 119.4 kg (263 lb 3.7 oz)  Body mass index is 43.8 kg/m².    Estimated Creatinine Clearance: 92 mL/min (based on SCr of 0.6 mg/dL).     Physical Exam  Vitals and nursing note reviewed.   Constitutional:       General: She is not in acute distress.     Appearance: She is ill-appearing (chronically). She is not toxic-appearing or diaphoretic.      Comments: BMI 43.8   HENT:      Nose: No congestion.      Mouth/Throat:      Mouth: Mucous membranes are dry.   Eyes:      General: No scleral icterus.     Conjunctiva/sclera: Conjunctivae normal.   Cardiovascular:      Rate and Rhythm: Normal rate.      Heart sounds: Normal heart sounds. No murmur heard.  Pulmonary:      Effort: Pulmonary effort is normal. No respiratory distress.      Breath sounds: Normal breath sounds.      Comments: Reduced lung sounds lower lobes.   Abdominal:      General: Bowel sounds are normal. There is no distension.       Palpations: Abdomen is soft.      Tenderness: There is no abdominal tenderness. There is no right CVA tenderness, left CVA tenderness or guarding.   Musculoskeletal:         General: No swelling or tenderness.      Cervical back: No rigidity or tenderness.      Right lower leg: No edema.      Left lower leg: No edema.   Skin:     General: Skin is warm and dry.      Findings: No erythema or rash.      Comments: Chronic vascular skin changes LE b/L. Skin intact. No TTP.   Neurological:      Mental Status: She is alert. She is disoriented.      Comments: Oriented to self only (baseline).          Significant Labs: Blood Culture:   Recent Labs   Lab 11/02/23  2145 11/02/23  2200 11/24/23  1053   LABBLOO No growth after 5 days. No growth after 5 days. No Growth to date  No Growth to date  No Growth to date  No Growth to date     CBC:   Recent Labs   Lab 11/24/23  0123 11/24/23  1526 11/24/23  1802 11/25/23  0429   WBC 3.53*  --   --  2.89*   HGB 15.1  --   --  13.6   HCT 46.5 43 40 43.1   PLT 45*  --   --  44*     CMP:   Recent Labs   Lab 11/24/23  0123 11/25/23  0429    145   K 4.0 3.9    105   CO2 23 29   * 48*   BUN 10 9   CREATININE 0.7 0.6   CALCIUM 10.8* 10.5   PROT 8.0 7.1   ALBUMIN 3.3* 2.8*   BILITOT 4.3* 3.9*   ALKPHOS 118 93   AST 16 21   ALT 9* 9*   ANIONGAP 14 11     Microbiology Results (last 7 days)       Procedure Component Value Units Date/Time    Blood culture [[id number]] Collected: 11/24/23 1053    Order Status: Completed Specimen: Blood from Peripheral, Hand, Right Updated: 11/25/23 1212     Blood Culture, Routine No Growth to date      No Growth to date    Blood culture [[id number]] Collected: 11/24/23 1053    Order Status: Completed Specimen: Blood from Peripheral, Upper Arm, Right Updated: 11/25/23 1212     Blood Culture, Routine No Growth to date      No Growth to date    Urine culture [[id number]] Collected: 11/24/23 0256    Order Status: No result Specimen: Urine Updated:  11/24/23 0353          Urine Culture:   Recent Labs   Lab 11/02/23 2111   LABURIN ESCHERICHIA COLI ESBL  >100,000 cfu/ml  *     Urine Studies:   Recent Labs   Lab 11/02/23 2111 11/24/23  0256   COLORU Yellow Yellow   APPEARANCEUA Clear Clear   PHUR 6.0 5.0   SPECGRAV 1.020 1.015   PROTEINUA Negative Negative   GLUCUA 4+* 3+*   KETONESU Negative Negative   BILIRUBINUA Negative Negative   OCCULTUA 1+* 1+*   NITRITE Positive* Negative   UROBILINOGEN Negative  --    LEUKOCYTESUR 2+* Trace*   RBCUA 2 2   WBCUA 51* 17*   BACTERIA Rare Rare   SQUAMEPITHEL 0 1     All pertinent labs within the past 24 hours have been reviewed.    Significant Imaging: I have reviewed all pertinent imaging results/findings within the past 24 hours.    I have personally reviewed records / hospital notes from   service and other specialty providers. I have also reviewed CBC, CMP/BMP,  cultures and imaging with my interpretation as documented in my assessment / plan.    Patient is high risk for infectious complications given pt's age, multiple co-morbidities, and case complexity.      Time: 75 minutes   50% of time spent on face-to-face counseling and coordination of care. Counseling included review of test results, diagnosis, and treatment plan with patient and/or family.

## 2023-11-25 NOTE — ASSESSMENT & PLAN NOTE
82 yo F with PMHx of HTN, T2DM, A fib (on eliquis), DVT, ICD, dementia, breast cancer s/p mastectomy, and ESBL UTI who presented to ED from [facility] for worsening mental status.    Critical care consulted for worsening mental status. Per chart review and discussion with primary team, on arrival to ED pt was confused but generally alert. However throughout the day pt became increasingly somnolent but still arousable.     Pt afebrile, leukopenia at 3.5, UA with 17 WBC, reflexed to Ucx. UTI may be contributing to AMS, would continue broad spectrum coverage for hx ESBL UTI.    CT head initially c/f possible hemorrhage vs motion artifact, repeat was negative for hemorrhage. Low likelihood for new hemorrhage since imaging this AM.    Cxr with some evidence mild volume overload, but no large effusions or consolidation, BNP elevated 586, trop slightly elevated at 0.081 -> 0.087. Not particularly volume overloaded on exam. Echo with Nl LVEF, but somewhat poor visualization of RV and c/f RV dilation and elevated CVP. D-dimer elevated to 5.42, but no evidence of PE on CTA chest.    Initial VBG 7.43/52.8/42/35/2 -> 7.32/68.3/61/35/3 -> 7.37/56.9/59/33.5. Based on record review pt is chronic CO2 retainer and has had persistently elevated bicarb on numerous metabolic panels dating back months. May be contributing to AMS, however, pt is a chronic retainer.    Pt evaluated at bedside by critical care team. Pt seems to be more alert on exam than earlier in the day and became somewhat agitated with questioning/exam/blood gas stick. Vitals are stable, HDS, satting well on RA, repeat VBG shows improving pH and improving CO2. D/w primary team and plan to transfer pt off Obs unit and place pt on BiPAP qhs. No ICU needs at this time

## 2023-11-25 NOTE — NURSING
Patient blood sugar 48, D10 given per protocol, patient confused and unable to communicate properly, she refused to eat and drink ,sugar rechecked 116 after bolus given, report given to day nurse

## 2023-11-25 NOTE — NURSING
Pt CBG 47, Dextrose 10% administered intravenous. Pt CBG now 83, then 160, pt responds to verbal/touch stimuli. Pm lovenox administered.  O2 Sat 88% O2/2L/NC, now 92%.Report given to receiving nurse on CSU. Transportation notified of transfer. No further distress noted. Awaiting transport.

## 2023-11-25 NOTE — NURSING
Ok to give metoprolol as per Anna Mcintosh MD although within 8 hrs time frame. Last dose give at 0651 Metoprolol 2.5 mg IV.

## 2023-11-25 NOTE — CARE UPDATE
Called to update patient's daughter. No answer. Will attempt again later today.     Anna Mcintosh MD  Internal Medicine, PGY-3

## 2023-11-25 NOTE — PLAN OF CARE
Patient re-oriented to date and time, safety instructions given, plan of care explained, denies pain, no s/s of distress, will continue to monitor.    Problem: Infection  Goal: Absence of Infection Signs and Symptoms  Outcome: Ongoing, Progressing     Problem: Adult Inpatient Plan of Care  Goal: Plan of Care Review  Outcome: Ongoing, Progressing  Goal: Patient-Specific Goal (Individualized)  Outcome: Ongoing, Progressing  Goal: Absence of Hospital-Acquired Illness or Injury  Outcome: Ongoing, Progressing  Goal: Optimal Comfort and Wellbeing  Outcome: Ongoing, Progressing  Goal: Readiness for Transition of Care  Outcome: Ongoing, Progressing     Problem: Bariatric Environmental Safety  Goal: Safety Maintained with Care  Outcome: Ongoing, Progressing     Problem: Diabetes Comorbidity  Goal: Blood Glucose Level Within Targeted Range  Outcome: Ongoing, Progressing     Problem: Impaired Wound Healing  Goal: Optimal Wound Healing  Outcome: Ongoing, Progressing     Problem: Skin Injury Risk Increased  Goal: Skin Health and Integrity  Outcome: Ongoing, Progressing

## 2023-11-25 NOTE — NURSING
Pt transferred to CSU room 315 per transport via stretcher with cell phone. O2/2L/NC via o2 tank. Tele box with pt, call unit to tube box back. No distress noted upon departure. Pt remains lethargic, responding to voice/touch.

## 2023-11-25 NOTE — PLAN OF CARE
Problem: Infection  Goal: Absence of Infection Signs and Symptoms  Outcome: Ongoing, Progressing     Problem: Adult Inpatient Plan of Care  Goal: Plan of Care Review  Outcome: Ongoing, Progressing     Problem: Bariatric Environmental Safety  Goal: Safety Maintained with Care  Outcome: Ongoing, Progressing     Problem: Diabetes Comorbidity  Goal: Blood Glucose Level Within Targeted Range  Outcome: Ongoing, Progressing     Problem: Skin Injury Risk Increased  Goal: Skin Health and Integrity  Outcome: Ongoing, Progressing

## 2023-11-25 NOTE — PROGRESS NOTES
"[facility] - Cardiology [facility] Medicine  Progress Note    Patient Name: Dania Her  MRN: [id number]  Patient Class: OP- Observation   Admission Date: 11/24/2023  Length of Stay: 0 days  Attending Physician: Eunice Bear MD  Primary Care Provider: [facility] -        Subjective:     Principal Problem:<principal problem not specified>        HPI:  Dania Her is a 82 yo F with PMHx of HTN, T2DM, A fib (on eliquis), DVT, ICD, dementia, breast cancer s/p mastectomy, and ESBL UTI who presented to ED from [facility] for worsening mental status.On my interview, patient only oriented to self and complains of L shoulder pain. Unable to obtain any further history from patient.  Per ED provider: "At baseline she is typically oriented to self and others, but otherwise confused.  Today oriented only to self.  Did not recognize EMS staff. No fever at facility, but they reported foul-smelling urine and dyspnea of unclear etiology.  No reported falls.  She tells me she had a fall a couple of weeks ago and has pain in the left shoulder."    In ED: Afebrile. VSS. Satting 93-95%  on room air. WBC 3.53. Hgb 15.1. CMP with  and hypercalcemia. Corrected calcium 11.4. . Trop 0.081. lactic 1.5. TSH 1.415. UA appears infectious with WBC 17. Initial CTH with Focal hyperdensity along the anterior left frontal lobe, which may be artifactual, though small focus of hemorrhage cannot be excluded. Repeat CTH with no convincing acute hemorrhage. Chronic R maxillary sinusitis and chronic microvascular ischemic changes noted. CT cervical spine with moderate spinal canal stenosis at C6-C7 and areas of moderate neural foraminal narrowing from C4 through T1. XR L shoulder with osteoarthritic changes and diffuse osteopenia, but no acute fracture or dislocation. CXR with There is central vascular congestion and possible mild perihilar interstitial edema, similar to prior study.  There are scattered bandlike " opacities suggesting atelectasis or scarring.  No new large confluent airspace consolidation identified.  Given IV lasix 40 mg and po seroquel 25 mg. Placed in observation.     Overview/Hospital Course:  Initial concern for acute on chronic HF given patient's elevated BNP. Upon further chart review, it appears BNP is usually 800-1000 when patient is in an acute exacerbation. Dry mucous membranes on examination. Lasix stopped. Ertapenem started for concern of ESBL UTI. Of note, TTE w/ CVP 15 but there was mention of 'RV dilation w/ decreased function' w/ concern for 'pHTN vs pulmonary disease vs PE'. D-dimer 5.42. STAT CTA chest ruled out PE but did show LLL consolidation and peribronchial infection. Worsening somnolence and hypercapnia; MICU consulted. Ultimately, patient transferred to stepdown floor to initiate BiPAP QHS. Mental status has improved. Pending Ucx before determining dispo.     Interval History:    Review of Systems   Unable to perform ROS: Dementia     Objective:     Vital Signs (Most Recent):  Temp: 98.7 °F (37.1 °C) (11/25/23 0756)  Pulse: 73 (11/25/23 0756)  Resp: 20 (11/25/23 0756)  BP: 109/65 (11/25/23 0756)  SpO2: 98 % (11/25/23 0756) Vital Signs (24h Range):  Temp:  [96.1 °F (35.6 °C)-98.8 °F (37.1 °C)] 98.7 °F (37.1 °C)  Pulse:  [69-82] 73  Resp:  [16-20] 20  SpO2:  [90 %-99 %] 98 %  BP: ()/(60-81) 109/65     Weight: 119.4 kg (263 lb 3.7 oz)  Body mass index is 43.8 kg/m².    Intake/Output Summary (Last 24 hours) at 11/25/2023 0904  Last data filed at 11/24/2023 2230  Gross per 24 hour   Intake 200 ml   Output --   Net 200 ml         Physical Exam  Constitutional:       Appearance: Normal appearance.   HENT:      Mouth/Throat:      Mouth: Mucous membranes are dry.      Pharynx: Oropharynx is clear.   Eyes:      Extraocular Movements: Extraocular movements intact.      Conjunctiva/sclera: Conjunctivae normal.   Cardiovascular:      Rate and Rhythm: Normal rate and regular rhythm.       Heart sounds: Normal heart sounds. No murmur heard.  Pulmonary:      Effort: No respiratory distress.      Breath sounds: Normal breath sounds.   Musculoskeletal:      Comments: Chronic bilateral LE skin changes    Skin:     General: Skin is warm and dry.   Neurological:      Mental Status: She is alert. She is disoriented.             Significant Labs: All pertinent labs within the past 24 hours have been reviewed.    Significant Imaging: I have reviewed all pertinent imaging results/findings within the past 24 hours.    Assessment/Plan:      Chronic heart failure  - BNP in 500s; lower than usual when patient presents for acute HF exacerbation. TTE w/ CVP 15 but clinically appears to be euvolemic. Will resume home lasix BID      Dementia  - dementia at baseline. Typically only oriented to self and others  - continue donepezil  - delirium precautions     Urinary tract infection due to extended-spectrum beta lactamase (ESBL) producing Escherichia coli  - hx of ESBL UTI  - daughter reporting foul smelling urine   - UA infectious and CBC with leukopenia  - will start ertapenem and consult ID  - follow urine cx    Acute metabolic encephalopathy  - Afebrile but with leukopenia  - UA infectious; on erta pending culture results   - CT head initially concerning for possible hemorrhage, but repeat showed no acute process  - TSH wnl   - ddx: UTI infection, CHF, dehydration, polypharmacy, worsening dementia, delirium, CVA  - Neuro checks q4h  - Delirium precautions  - Worsening hypercapnia on 11/25 improved w/ BiPAP; now at baseline    Chronic pain of both shoulders  - L shoulder XR with OA and osteopenia but without acute process  - continue prn tramadol    Breast cancer  - chronic, s/p mastectomy with chronic chest wall pain  - continue home anastrozole    History of DVT of lower extremity  - continue eliquis    Atrial fibrillation  Patient with Persistent (7 days or more) atrial fibrillation which is controlled currently  with Beta Blocker and Calcium Channel Blocker. Patient is currently in atrial fibrillation.[id number] Score: 5. Anticoagulation indicated. Anticoagulation done with eliquis 5 mg BID .    Type 2 diabetes mellitus, with long-term current use of insulin  Patient's FSGs are controlled on current hypoglycemics.   Last A1c reviewed-   Lab Results   Component Value Date    HGBA1C 7.0 (H) 11/03/2023     Most recent fingerstick glucose reviewed-   Recent Labs   Lab 11/24/23  2124 11/25/23  0629 11/25/23  0710 11/25/23  1156   POCTGLUCOSE 160* 51* 116* 99       Current correctional scale  Low  Maintain anti-hyperglycemic dose as follows-   Antihyperglycemics (From admission, onward)      Start     Stop Route Frequency Ordered    11/24/23 0640  insulin aspart U-100 pen 0-5 Units         -- SubQ Before meals & nightly PRN 11/24/23 0540        - diabetic diet   - hold oral antihyperglycemics    Primary hypertension  - resume home antihypertensives as clinically indicated    Elevated troponin  - 0.081 -->0.087  - EKG w/o acute ischemic changes      VTE Risk Mitigation (From admission, onward)           Ordered     apixaban tablet 5 mg  2 times daily         11/25/23 0955     IP VTE HIGH RISK PATIENT  Once         11/24/23 0517     Place sequential compression device  Until discontinued         11/24/23 0517     IP VTE HIGH RISK PATIENT  Once         11/24/23 0517     Reason for No Pharmacological VTE Prophylaxis  Once        Question:  Reasons:  Answer:  Already adequately anticoagulated on oral Anticoagulants    11/24/23 0517                    Discharge Planning   VALENCIA: 11/26/2023     Code Status: Full Code   Is the patient medically ready for discharge?:     Reason for patient still in hospital (select all that apply): Patient trending condition  Discharge Plan A: Return to nursing home            Anna Mcintosh MD  Department of Hospital Medicine   [facility] - Cardiology Stepdown

## 2023-11-25 NOTE — ASSESSMENT & PLAN NOTE
- Afebrile but with leukopenia  - UA infectious; on erta pending culture results   - CT head initially concerning for possible hemorrhage, but repeat showed no acute process  - TSH wnl   - ddx: UTI infection, CHF, dehydration, polypharmacy, worsening dementia, delirium, CVA  - Neuro checks q4h  - Delirium precautions  - Worsening hypercapnia on 11/25 improved w/ BiPAP; now at baseline

## 2023-11-25 NOTE — ASSESSMENT & PLAN NOTE
Patient's FSGs are controlled on current hypoglycemics.   Last A1c reviewed-   Lab Results   Component Value Date    HGBA1C 7.0 (H) 11/03/2023     Most recent fingerstick glucose reviewed-   Recent Labs   Lab 11/24/23 2124 11/25/23  0629 11/25/23  0710 11/25/23  1156   POCTGLUCOSE 160* 51* 116* 99       Current correctional scale  Low  Maintain anti-hyperglycemic dose as follows-   Antihyperglycemics (From admission, onward)    Start     Stop Route Frequency Ordered    11/24/23 0640  insulin aspart U-100 pen 0-5 Units         -- SubQ Before meals & nightly PRN 11/24/23 0540      - diabetic diet   - hold oral antihyperglycemics

## 2023-11-25 NOTE — CARE UPDATE
RAPID RESPONSE NURSE PROACTIVE ROUNDING NOTE       Time of Visit:     Admit Date: 2023  LOS: 0  Code Status: Full Code   Date of Visit: 2023  : 1940  Age: 83 y.o.  Sex: female  Race: Black or   Bed: [facility] A:   MRN: [id number]  Was the patient discharged from an ICU this admission? No   Was the patient discharged from a PACU within last 24 hours? No   Did the patient receive conscious sedation/general anesthesia in last 24 hours? No  Was the patient in the ED within the past 24 hours? Yes  Was the patient on NIPPV within the past 24 hours? No   Attending Physician: Eunice Bear MD  Primary Service: [facility]    Time spent at the bedside: < 15 min    SITUATION    Notified by MD.  Reason for alert: lethargy  Called to evaluate the patient for Respiratory    BACKGROUND     Why is the patient in the hospital?: Acute metabolic encephalopathy    Patient has a past medical history of Arthritis, Dementia, Diabetes mellitus, and Hypertension.    Last Vitals:  Temp: 97.1 °F (36.2 °C) (1620)  Pulse: 71 (1620)  Resp: 17 (1620)  BP: 98/60 (1620)  SpO2: 97 % (1620)    24 Hours Vitals Range:  Temp:  [97.1 °F (36.2 °C)-98.8 °F (37.1 °C)]   Pulse:  [69-75]   Resp:  [16-22]   BP: ()/(60-97)   SpO2:  [91 %-99 %]     Labs:  Recent Labs     23  0123 11/24/23  1526 23  1802   WBC 3.53*  --   --    HGB 15.1  --   --    HCT 46.5 43 40   PLT 45*  --   --        Recent Labs     23      K 4.0      CO2 23   BUN 10   CREATININE 0.7   *   MG 1.9        Recent Labs     23  0108 23  1526 23  1802   PH 7.432 7.321* 7.378   PCO2 52.8* 68.3* 56.9*   PO2 42 61* 59*   HCO3 35.2* 35.3* 33.5*   POCSATURATED 78 88 89   BE 11* 9* 8*        ASSESSMENT    Physical Exam  Constitutional:       Appearance: She is obese.   Pulmonary:      Effort: Accessory muscle usage present.      Breath sounds: Wheezing present.    Skin:     General: Skin is warm and dry.   Neurological:      Mental Status: She is lethargic and disoriented.      GCS: GCS eye subscore is 2. GCS verbal subscore is 3. GCS motor subscore is 5.         INTERVENTIONS    The patient was seen for Respiratory problem. Staff concerns included hypercapnia. The following interventions were performed: Bipap, POCT arterial blood gas , continuous pulse ox monitoring, continuous cardiac monitoring, and transfer to SD.    RECOMMENDATIONS    Continuous tele and O2, Bipap qhs, follow up by CCM and RRT    PROVIDER ESCALATION    Yes/No  Yes    Orders received and case discussed with Dr. Rivera .    Disposition: Tx to [facility], bed 315.    FOLLOW-UP    Bedside RNRenata  updated on plan of care. Instructed to call the Rapid Response Nurse, Linden Hobson RN at [phone] for additional questions or concerns.

## 2023-11-25 NOTE — PLAN OF CARE
Problem: Infection  Goal: Absence of Infection Signs and Symptoms  Outcome: Ongoing, Progressing     Problem: Adult Inpatient Plan of Care  Goal: Plan of Care Review  Outcome: Ongoing, Progressing  Goal: Optimal Comfort and Wellbeing  Outcome: Ongoing, Progressing  Goal: Readiness for Transition of Care  Outcome: Ongoing, Progressing     Problem: Diabetes Comorbidity  Goal: Blood Glucose Level Within Targeted Range  Outcome: Ongoing, Progressing     AAOX2,VSS,O2 sats > 95% on 2L NC. Plan of care discussed with patient. Patient has no complaints of chest pain/SOB/palpitations. Pt needs assist x 2 fall precautions in place,no falls/injuries through the shift.Discussed medications and care. Patient has no questions at this time. BG monitored. Pt resting comfortably with no acute distress. Call light within reach,bed at lowest position. 4 eyes refused.

## 2023-11-25 NOTE — NURSING
Transferred from observation floor to room 315, alert and oriented to self, place, denies pain,no s/s of distress,skin checked with second RN, oriented to room and call light, safety instruction given, verbalized understanding, VS WNL, comfort measures given, call light within reach, bed at low position.

## 2023-11-25 NOTE — ASSESSMENT & PLAN NOTE
Patient with Persistent (7 days or more) atrial fibrillation which is controlled currently with Beta Blocker and Calcium Channel Blocker. Patient is currently in atrial fibrillation.[id number] Score: 5. Anticoagulation indicated. Anticoagulation done with eliquis 5 mg BID .

## 2023-11-25 NOTE — ASSESSMENT & PLAN NOTE
I have reviewed hospital notes from   service and other specialty providers. I have also reviewed CBC, CMP/BMP,  cultures and imaging with my interpretation as documented.      83F with h/o dementia (oriented to self at baseline), HTN, DM2, Afib (On eliquis), DVT, ICD, breast cancer s/p mastectomy, and recent hosp admissions for pt debility and odd behavior / AMS. Last hospital admission (11/02), Ucx ESBL-E.coli and received course of merrem inpatient for potential clinical benefit.  -- Pt now presents (11/24) from [facility] for similar complaints of worsening mental status, with foul-smelling urine and SOB w/o cough. Pt also c/o L shoulder pain s/p fall 2-3wks ago. On arrival, pt afebrile, VSS, Hds, wbc nml, Hg 15, . Trop 0.081. lactic 1.5.     UA showed WBC 17, rare bacteria; Ucx No Growth. Bld cxs NGTD. Pt started on empiric Iv-Erta (11/24) pending infectious work-up and ID consult for c/f UTI.     CXR unrevealing; Chest-CT showed: Focus of consolidation within the periphery of the left lower lobe, new since the previous exam concerning for developing infection. Given IV lasix 40 mg and po seroquel 25 mg; Placed in observation. Remains stable, no s/sxs of systemic infectious process.     Recommendations / Plan:  Continue Iv-Ertapenem for 5d course, JEFFERY 11/28 -- for c/f new developing PNA.    -- Pt may f/u with ID as needed.  -- Discussed with ID staff and primary team.  -- ID will sign off at this time. Please see OPAT note below for outpt abx plans.       Outpatient Antibiotic Therapy Plan:    Please send referral to [facility].    1) Infection :   PNA    2) Discharge Antibiotics:     Intravenous antibiotics:  IV - Ertapenem 1g Q24h    3) Therapy Duration:  5d    Estimated end date of IV antibiotics:  11/28    4) Outpatient Weekly Labs:  at end of abx therapy, obtain CRP, CBC, and CMP.    5) Fax Lab Results to Infectious Diseases Provider:  Shakeel Ramon,  RENAN    [facility] Fax Number: [phone]    6) Outpatient Infectious Diseases Follow-up    Follow-up appointment will be arranged by the ID clinic and will be found in the patient's appointments tab.    Prior to discharge, please ensure the patient's follow-up appt has been scheduled with ID-Clinic -- for patient convenience and continuity of care.  If there is still no follow-up scheduled prior to discharge, please send an EPIC message to  Nusrat Garza LPN  in Infectious Diseases.

## 2023-11-26 VITALS
OXYGEN SATURATION: 93 % | SYSTOLIC BLOOD PRESSURE: 97 MMHG | WEIGHT: 263.25 LBS | BODY MASS INDEX: 43.86 KG/M2 | HEART RATE: 68 BPM | HEIGHT: 65 IN | TEMPERATURE: 99 F | RESPIRATION RATE: 16 BRPM | DIASTOLIC BLOOD PRESSURE: 63 MMHG

## 2023-11-26 PROBLEM — J18.9 CAP (COMMUNITY ACQUIRED PNEUMONIA): Status: ACTIVE | Noted: 2023-11-26

## 2023-11-26 LAB
ALBUMIN SERPL BCP-MCNC: 2.6 G/DL (ref 3.5–5.2)
ALP SERPL-CCNC: 87 U/L (ref 55–135)
ALT SERPL W/O P-5'-P-CCNC: 11 U/L (ref 10–44)
ANION GAP SERPL CALC-SCNC: 8 MMOL/L (ref 8–16)
AST SERPL-CCNC: 28 U/L (ref 10–40)
BILIRUB SERPL-MCNC: 3.1 MG/DL (ref 0.1–1)
BUN SERPL-MCNC: 10 MG/DL (ref 8–23)
CALCIUM SERPL-MCNC: 9.9 MG/DL (ref 8.7–10.5)
CHLORIDE SERPL-SCNC: 103 MMOL/L (ref 95–110)
CO2 SERPL-SCNC: 31 MMOL/L (ref 23–29)
CREAT SERPL-MCNC: 0.7 MG/DL (ref 0.5–1.4)
ERYTHROCYTE [DISTWIDTH] IN BLOOD BY AUTOMATED COUNT: 16.9 % (ref 11.5–14.5)
EST. GFR  (NO RACE VARIABLE): >60 ML/MIN/1.73 M^2
GLUCOSE SERPL-MCNC: 79 MG/DL (ref 70–110)
HCT VFR BLD AUTO: 37 % (ref 37–48.5)
HGB BLD-MCNC: 11.7 G/DL (ref 12–16)
MAGNESIUM SERPL-MCNC: 2 MG/DL (ref 1.6–2.6)
MCH RBC QN AUTO: 34.2 PG (ref 27–31)
MCHC RBC AUTO-ENTMCNC: 31.6 G/DL (ref 32–36)
MCV RBC AUTO: 108 FL (ref 82–98)
PLATELET # BLD AUTO: 73 K/UL (ref 150–450)
PMV BLD AUTO: 12.9 FL (ref 9.2–12.9)
POCT GLUCOSE: 111 MG/DL (ref 70–110)
POCT GLUCOSE: 112 MG/DL (ref 70–110)
POCT GLUCOSE: 83 MG/DL (ref 70–110)
POCT GLUCOSE: 88 MG/DL (ref 70–110)
POTASSIUM SERPL-SCNC: 4.6 MMOL/L (ref 3.5–5.1)
PROT SERPL-MCNC: 7 G/DL (ref 6–8.4)
RBC # BLD AUTO: 3.42 M/UL (ref 4–5.4)
SODIUM SERPL-SCNC: 142 MMOL/L (ref 136–145)
WBC # BLD AUTO: 3.1 K/UL (ref 3.9–12.7)

## 2023-11-26 PROCEDURE — 25000003 PHARM REV CODE 250

## 2023-11-26 PROCEDURE — 99900035 HC TECH TIME PER 15 MIN (STAT)

## 2023-11-26 PROCEDURE — 94660 CPAP INITIATION&MGMT: CPT

## 2023-11-26 PROCEDURE — 51798 US URINE CAPACITY MEASURE: CPT

## 2023-11-26 PROCEDURE — 94761 N-INVAS EAR/PLS OXIMETRY MLT: CPT

## 2023-11-26 PROCEDURE — 85027 COMPLETE CBC AUTOMATED: CPT

## 2023-11-26 PROCEDURE — 83735 ASSAY OF MAGNESIUM: CPT

## 2023-11-26 PROCEDURE — 27100171 HC OXYGEN HIGH FLOW UP TO 24 HOURS

## 2023-11-26 PROCEDURE — 80053 COMPREHEN METABOLIC PANEL: CPT

## 2023-11-26 PROCEDURE — G0378 HOSPITAL OBSERVATION PER HR: HCPCS

## 2023-11-26 PROCEDURE — 63700000 PHARM REV CODE 250 ALT 637 W/O HCPCS

## 2023-11-26 PROCEDURE — 27000190 HC CPAP FULL FACE MASK W/VALVE

## 2023-11-26 PROCEDURE — 36415 COLL VENOUS BLD VENIPUNCTURE: CPT

## 2023-11-26 RX ORDER — AZITHROMYCIN 500 MG/1
500 TABLET, FILM COATED ORAL DAILY
Qty: 1 TABLET | Refills: 0 | Status: SHIPPED | OUTPATIENT
Start: 2023-11-26 | End: 2023-11-27

## 2023-11-26 RX ORDER — CEFPODOXIME PROXETIL 200 MG/1
200 TABLET, FILM COATED ORAL EVERY 12 HOURS
Qty: 5 TABLET | Refills: 0 | Status: SHIPPED | OUTPATIENT
Start: 2023-11-26 | End: 2023-11-27

## 2023-11-26 RX ORDER — AZITHROMYCIN 500 MG/1
500 TABLET, FILM COATED ORAL DAILY
Qty: 1 TABLET | Refills: 0 | Status: SHIPPED | OUTPATIENT
Start: 2023-11-26 | End: 2023-11-26 | Stop reason: SDUPTHER

## 2023-11-26 RX ORDER — CEFPODOXIME PROXETIL 200 MG/1
200 TABLET, FILM COATED ORAL EVERY 12 HOURS
Qty: 5 TABLET | Refills: 0 | Status: SHIPPED | OUTPATIENT
Start: 2023-11-26 | End: 2023-11-26 | Stop reason: SDUPTHER

## 2023-11-26 RX ADMIN — ANASTROZOLE 1 MG: 1 TABLET, COATED ORAL at 09:11

## 2023-11-26 RX ADMIN — FUROSEMIDE 20 MG: 20 TABLET ORAL at 09:11

## 2023-11-26 RX ADMIN — APIXABAN 5 MG: 5 TABLET, FILM COATED ORAL at 09:11

## 2023-11-26 RX ADMIN — SPIRONOLACTONE 25 MG: 25 TABLET ORAL at 09:11

## 2023-11-26 RX ADMIN — PANTOPRAZOLE SODIUM 40 MG: 40 TABLET, DELAYED RELEASE ORAL at 09:11

## 2023-11-26 RX ADMIN — MICONAZOLE NITRATE 2 % TOPICAL POWDER: at 09:11

## 2023-11-26 RX ADMIN — METOPROLOL SUCCINATE 200 MG: 100 TABLET, EXTENDED RELEASE ORAL at 09:11

## 2023-11-26 RX ADMIN — AZITHROMYCIN 500 MG: 250 TABLET, FILM COATED ORAL at 09:11

## 2023-11-26 RX ADMIN — SENNOSIDES AND DOCUSATE SODIUM 1 TABLET: 8.6; 5 TABLET ORAL at 09:11

## 2023-11-26 RX ADMIN — CEFPODOXIME PROXETIL 200 MG: 200 TABLET, FILM COATED ORAL at 09:11

## 2023-11-26 NOTE — DISCHARGE SUMMARY
"[facility] Cardiology [facility] Medicine  Discharge Summary      Patient Name: Dania Her  MRN: [id number]  FAITH: [id number]  Patient Class: OP- Observation  Admission Date: 11/24/2023  Hospital Length of Stay: 0 days  Discharge Date and Time:  11/26/2023 12:00 PM  Attending Physician: Eunice Bear MD   Discharging Provider: Anna Mcintosh MD  Primary Care Provider: [facility] Medicine Team: [facility] Anna Mcintosh MD  Primary Care Team: [facility]    HPI:   Dania Her is a 84 yo F with PMHx of HTN, T2DM, A fib (on eliquis), DVT, ICD, dementia, breast cancer s/p mastectomy, and ESBL UTI who presented to ED from [facility] for worsening mental status.On my interview, patient only oriented to self and complains of L shoulder pain. Unable to obtain any further history from patient.  Per ED provider: "At baseline she is typically oriented to self and others, but otherwise confused.  Today oriented only to self.  Did not recognize EMS staff. No fever at facility, but they reported foul-smelling urine and dyspnea of unclear etiology.  No reported falls.  She tells me she had a fall a couple of weeks ago and has pain in the left shoulder."    In ED: Afebrile. VSS. Satting 93-95%  on room air. WBC 3.53. Hgb 15.1. CMP with  and hypercalcemia. Corrected calcium 11.4. . Trop 0.081. lactic 1.5. TSH 1.415. UA appears infectious with WBC 17. Initial CTH with Focal hyperdensity along the anterior left frontal lobe, which may be artifactual, though small focus of hemorrhage cannot be excluded. Repeat CTH with no convincing acute hemorrhage. Chronic R maxillary sinusitis and chronic microvascular ischemic changes noted. CT cervical spine with moderate spinal canal stenosis at C6-C7 and areas of moderate neural foraminal narrowing from C4 through T1. XR L shoulder with osteoarthritic changes and diffuse osteopenia, but no acute fracture or dislocation. CXR with " There is central vascular congestion and possible mild perihilar interstitial edema, similar to prior study.  There are scattered bandlike opacities suggesting atelectasis or scarring.  No new large confluent airspace consolidation identified.  Given IV lasix 40 mg and po seroquel 25 mg. Placed in observation.     * No surgery found *      Hospital Course:   Initial concern for acute on chronic HF given patient's elevated BNP. Upon further chart review, it appears BNP is usually 800-1000 when patient is in an acute exacerbation. Dry mucous membranes on examination. Lasix stopped. Ertapenem started for concern of ESBL UTI. Of note, TTE w/ CVP 15 but there was mention of 'RV dilation w/ decreased function' w/ concern for 'pHTN vs pulmonary disease vs PE'. D-dimer 5.42. STAT CTA chest ruled out PE but did show LLL consolidation and peribronchial infection. Worsening somnolence and hypercapnia; MICU consulted. Ultimately, patient transferred to stepdown floor to initiate BiPAP QHS. Mental status has improved; back to baseline. Urine culture w/ no growth. Patient is bedbound at baseline. Came [facility] [place] but daughter would like to take her home w/ HH. Will discharge home w/ cefpodoxime and azithromycin to complete CAP treatment. Stable at time of discharge. PCP follow up.      Goals of Care Treatment Preferences:  Code Status: Full Code          What is most important right now is to focus on remaining as independent as possible, improvement in condition but with limits to invasive therapies.  Accordingly, we have decided that the best plan to meet the patient's goals includes continuing with treatment.      Consults:   Consults (From admission, onward)          Status Ordering Provider     Inpatient consult to Infectious Diseases  Once        Provider:  (Not yet assigned)    KATHARINE Talley     Inpatient consult to Critical Care Medicine  Once        Provider:  (Not yet assigned)    Completed HAILEY  KATHARINE Barajas new Assessment & Plan notes have been filed under this hospital service since the last note was generated.  Service: Hospital Medicine    Final Active Diagnoses:    Diagnosis Date Noted POA    PRINCIPAL PROBLEM:  CAP (community acquired pneumonia) [J18.9] 11/26/2023 Unknown    Chronic heart failure [I50.9] 11/24/2023 Yes    Urinary tract infection due to extended-spectrum beta lactamase (ESBL) producing Escherichia coli [N39.0, B96.29, Z16.12] 11/02/2023 Yes    Dementia [F03.90] 11/02/2023 Yes    Acute metabolic encephalopathy [G93.41] 11/02/2023 Yes    Chronic pain of both shoulders [M25.511, G89.29, M25.512] 02/13/2023 Yes    Elevated troponin [R79.89] 02/12/2023 Yes    Primary hypertension [I10] 02/12/2023 Yes    Type 2 diabetes mellitus, with long-term current use of insulin [E11.9, Z79.4] 02/12/2023 Not Applicable    Atrial fibrillation [I48.91] 02/12/2023 Yes    Breast cancer [C50.919] 02/12/2023 Yes    History of DVT of lower extremity [Z86.718] 07/08/2018 Not Applicable      Problems Resolved During this Admission:       Discharged Condition: stable    Disposition: Skilled Nursing Facility    Follow Up:   Follow-up Information       [facility] - Follow up in 1 week(s).    Contact information:  [address]  [facility] [place] [facility] [address]  [phone]               [facility] Follow up.    Specialties: Nursing Home Agency, SNF Agency  Contact information:  [address] [address]  [phone]                           Patient Instructions:   No discharge procedures on file.    Significant Diagnostic Studies: Labs: All labs within the past 24 hours have been reviewed    Pending Diagnostic Studies:       None           Medications:  Reconciled Home Medications:      Medication List        START taking these medications      azithromycin 500 MG tablet  Commonly known as: ZITHROMAX  Take 1 tablet (500 mg total) by mouth once daily. for 1 day      cefpodoxime 200 MG tablet  Commonly known as: VANTIN  Take 1 tablet (200 mg total) by mouth every 12 (twelve) hours. for 3 days            CONTINUE taking these medications      albuterol 0.63 mg/3 mL Nebu  Commonly known as: ACCUNEB  Take 0.63 mg by nebulization every 6 (six) hours as needed. Rescue     anastrozole 1 mg Tab  Commonly known as: ARIMIDEX  Take 1 mg by mouth once daily.     bisoprolol 10 MG tablet  Commonly known as: ZEBETA  Take 1 tablet by mouth once daily.     cholecalciferol (vitamin D3) 1,250 mcg (50,000 unit) capsule  Take 50,000 Units by mouth every 30 days.     diphenhydrAMINE 50 MG capsule  Commonly known as: BENADRYL  Take 50 mg by mouth nightly as needed for Itching or Insomnia.     donepeziL 5 MG tablet  Commonly known as: ARICEPT  Take 1 tablet by mouth every evening.     ELIQUIS 5 mg Tab  Generic drug: apixaban  Take 5 mg by mouth 2 (two) times daily.     empagliflozin 10 mg tablet  Commonly known as: Jardiance  Take 1 tablet by mouth once daily.     EScitalopram oxalate 10 MG tablet  Commonly known as: LEXAPRO  Take 10 mg by mouth once daily.     furosemide 20 MG tablet  Commonly known as: LASIX  Take 20 mg by mouth 2 (two) times daily.     glyBURIDE 5 MG tablet  Commonly known as: DIABETA  Take 5 mg by mouth every morning.     melatonin 5 mg  Commonly known as: MELATIN  Take 1 tablet by mouth every evening.     nitrofurantoin (macrocrystal-monohydrate) 100 MG capsule  Commonly known as: MACROBID  Take 100 mg by mouth 2 (two) times daily. X 7 days     nystatin powder  Commonly known as: MYCOSTATIN  Apply topically 2 (two) times a day.     pantoprazole 40 MG tablet  Commonly known as: PROTONIX  Take 1 tablet (40 mg total) by mouth once daily.     polyethylene glycol 17 gram/dose powder  Commonly known as: GLYCOLAX  Take 17 g by mouth daily as needed for Constipation. (With 8 oz of water)     QUEtiapine 25 MG Tab  Commonly known as: SEROQUEL  Take 25 mg by mouth every evening.      senna-docusate 8.6-50 mg 8.6-50 mg per tablet  Commonly known as: PERICOLACE  Take 1 tablet by mouth once daily.     sodium chloride 0.9 % PgBk 100 mL with meropenem 1 gram SolR 1 g  Inject 1 g into the vein every 12 (twelve) hours.     spironolactone 25 MG tablet  Commonly known as: ALDACTONE  Take 25 mg by mouth once daily.     traMADoL 50 mg tablet  Commonly known as: ULTRAM  Take 50 mg by mouth every 6 (six) hours as needed for Pain.     traZODone 50 MG tablet  Commonly known as: DESYREL  Take 50 mg by mouth every evening.              Indwelling Lines/Drains at time of discharge:   Lines/Drains/Airways       Drain  Duration             Female External Urinary Catheter 11/24/23 0945 2 days                    Time spent on the discharge of patient: 35 minutes         Anna Mcintosh MD  Department of Hospital Medicine  [facility] - Cardiology Stepdown

## 2023-11-26 NOTE — PLAN OF CARE
Pt educated on fall risk overnight, pt remained free from falls/trauma/injury. Denies chest pain, SOB, palpitations, dizziness, pain, or discomfort. Plan of care reviewed with pt, all questions answered. Bed locked in lowest position, call bell within reach, no acute distress noted, will continue to monitor.     Patient had UO of 150, bladder scan performed with 154ml in the bladder. MD notified, will continue to monitor.     Problem: Infection  Goal: Absence of Infection Signs and Symptoms  Outcome: Ongoing, Progressing     Problem: Adult Inpatient Plan of Care  Goal: Plan of Care Review  Outcome: Ongoing, Progressing  Goal: Patient-Specific Goal (Individualized)  Outcome: Ongoing, Progressing  Goal: Absence of Hospital-Acquired Illness or Injury  Outcome: Ongoing, Progressing  Goal: Optimal Comfort and Wellbeing  Outcome: Ongoing, Progressing  Goal: Readiness for Transition of Care  Outcome: Ongoing, Progressing     Problem: Diabetes Comorbidity  Goal: Blood Glucose Level Within Targeted Range  Outcome: Ongoing, Progressing     Problem: Impaired Wound Healing  Goal: Optimal Wound Healing  Outcome: Ongoing, Progressing     Problem: Skin Injury Risk Increased  Goal: Skin Health and Integrity  Outcome: Ongoing, Progressing

## 2023-11-26 NOTE — PROGRESS NOTES
"[facility] - Cardiology [facility] Medicine  Progress Note    Patient Name: Dania Her  MRN: [id number]  Patient Class: OP- Observation   Admission Date: 11/24/2023  Length of Stay: 0 days  Attending Physician: Eunice Bear MD  Primary Care Provider: [facility] -        Subjective:     Principal Problem:CAP (community acquired pneumonia)        HPI:  Dania Her is a 82 yo F with PMHx of HTN, T2DM, A fib (on eliquis), DVT, ICD, dementia, breast cancer s/p mastectomy, and ESBL UTI who presented to ED from [facility] for worsening mental status.On my interview, patient only oriented to self and complains of L shoulder pain. Unable to obtain any further history from patient.  Per ED provider: "At baseline she is typically oriented to self and others, but otherwise confused.  Today oriented only to self.  Did not recognize EMS staff. No fever at facility, but they reported foul-smelling urine and dyspnea of unclear etiology.  No reported falls.  She tells me she had a fall a couple of weeks ago and has pain in the left shoulder."    In ED: Afebrile. VSS. Satting 93-95%  on room air. WBC 3.53. Hgb 15.1. CMP with  and hypercalcemia. Corrected calcium 11.4. . Trop 0.081. lactic 1.5. TSH 1.415. UA appears infectious with WBC 17. Initial CTH with Focal hyperdensity along the anterior left frontal lobe, which may be artifactual, though small focus of hemorrhage cannot be excluded. Repeat CTH with no convincing acute hemorrhage. Chronic R maxillary sinusitis and chronic microvascular ischemic changes noted. CT cervical spine with moderate spinal canal stenosis at C6-C7 and areas of moderate neural foraminal narrowing from C4 through T1. XR L shoulder with osteoarthritic changes and diffuse osteopenia, but no acute fracture or dislocation. CXR with There is central vascular congestion and possible mild perihilar interstitial edema, similar to prior study.  There are scattered bandlike " opacities suggesting atelectasis or scarring.  No new large confluent airspace consolidation identified.  Given IV lasix 40 mg and po seroquel 25 mg. Placed in observation.     Overview/Hospital Course:  Initial concern for acute on chronic HF given patient's elevated BNP. Upon further chart review, it appears BNP is usually 800-1000 when patient is in an acute exacerbation. Dry mucous membranes on examination. Lasix stopped. Ertapenem started for concern of ESBL UTI. Of note, TTE w/ CVP 15 but there was mention of 'RV dilation w/ decreased function' w/ concern for 'pHTN vs pulmonary disease vs PE'. D-dimer 5.42. STAT CTA chest ruled out PE but did show LLL consolidation and peribronchial infection. Worsening somnolence and hypercapnia; MICU consulted. Ultimately, patient transferred to stepdown floor to initiate BiPAP QHS. Mental status has improved; back to baseline. Urine culture w/ no growth. Will discharge back to group home nursing home w/ cefpodoxime and azithromycin to complete CAP treatment. Stable at time of discharge. PCP follow up.     No new subjective & objective note has been filed under this hospital service since the last note was generated.      Assessment/Plan:      Chronic heart failure  - BNP in 500s; lower than usual when patient presents for acute HF exacerbation. TTE w/ CVP 15 but clinically appears to be euvolemic. Will resume home lasix BID      Dementia  - dementia at baseline. Typically only oriented to self and others  - continue donepezil  - delirium precautions     Urinary tract infection due to extended-spectrum beta lactamase (ESBL) producing Escherichia coli  - hx of ESBL UTI  - daughter reporting foul smelling urine   - UA infectious and CBC with leukopenia  - will start ertapenem and consult ID  - follow urine cx    Acute metabolic encephalopathy  - Afebrile but with leukopenia  - UA infectious; on erta pending culture results   - CT head initially concerning for possible  hemorrhage, but repeat showed no acute process  - TSH wnl   - ddx: UTI infection, CHF, dehydration, polypharmacy, worsening dementia, delirium, CVA  - Neuro checks q4h  - Delirium precautions  - Worsening hypercapnia on 11/25 improved w/ BiPAP; now at baseline    Chronic pain of both shoulders  - L shoulder XR with OA and osteopenia but without acute process  - continue prn tramadol    Breast cancer  - chronic, s/p mastectomy with chronic chest wall pain  - continue home anastrozole    History of DVT of lower extremity  - continue eliquis    Atrial fibrillation  Patient with Persistent (7 days or more) atrial fibrillation which is controlled currently with Beta Blocker and Calcium Channel Blocker. Patient is currently in atrial fibrillation.[id number] Score: 5. Anticoagulation indicated. Anticoagulation done with eliquis 5 mg BID .    Type 2 diabetes mellitus, with long-term current use of insulin  Patient's FSGs are controlled on current hypoglycemics.   Last A1c reviewed-   Lab Results   Component Value Date    HGBA1C 7.0 (H) 11/03/2023     Most recent fingerstick glucose reviewed-   Recent Labs   Lab 11/24/23  2124 11/25/23  0629 11/25/23  0710 11/25/23  1156   POCTGLUCOSE 160* 51* 116* 99       Current correctional scale  Low  Maintain anti-hyperglycemic dose as follows-   Antihyperglycemics (From admission, onward)      Start     Stop Route Frequency Ordered    11/24/23 0640  insulin aspart U-100 pen 0-5 Units         -- SubQ Before meals & nightly PRN 11/24/23 0540        - diabetic diet   - hold oral antihyperglycemics    Primary hypertension  - resume home antihypertensives as clinically indicated    Elevated troponin  - 0.081 -->0.087  - EKG w/o acute ischemic changes      VTE Risk Mitigation (From admission, onward)           Ordered     apixaban tablet 5 mg  2 times daily         11/25/23 0955     IP VTE HIGH RISK PATIENT  Once         11/24/23 0517     Place sequential compression device  Until  discontinued         11/24/23 0517     IP VTE HIGH RISK PATIENT  Once         11/24/23 0517     Reason for No Pharmacological VTE Prophylaxis  Once        Question:  Reasons:  Answer:  Already adequately anticoagulated on oral Anticoagulants    11/24/23 0517                    Discharge Planning   VALENCIA: 11/26/2023     Code Status: Full Code   Is the patient medically ready for discharge?:     Reason for patient still in hospital (select all that apply): Patient trending condition  Discharge Plan A: Return to nursing home          Anna Mcintosh MD  Department of Hospital Medicine   [facility] - Cardiology Stepdown

## 2023-11-26 NOTE — PLAN OF CARE
NURSING HOME ORDERS    11/26/2023  [facility]  [facility] - CARDIOLOGY STEPDOWN  [address]  [facility] [address]  Dept: [phone]  Loc: [phone]     Admit to Nursing Home:      Diagnoses:  Active Hospital Problems    Diagnosis  POA    Chronic heart failure [I50.9]  Yes    Urinary tract infection due to extended-spectrum beta lactamase (ESBL) producing Escherichia coli [N39.0, B96.29, Z16.12]  Yes    Dementia [F03.90]  Yes    Acute metabolic encephalopathy [G93.41]  Yes    Chronic pain of both shoulders [M25.511, G89.29, M25.512]  Yes    Elevated troponin [R79.89]  Yes    Primary hypertension [I10]  Yes    Type 2 diabetes mellitus, with long-term current use of insulin [E11.9, Z79.4]  Not Applicable    Atrial fibrillation [I48.91]  Yes    Breast cancer [C50.919]  Yes    History of DVT of lower extremity [Z86.718]  Not Applicable      Resolved Hospital Problems   No resolved problems to display.       Patient is homebound due to:  <principal problem not specified>    Allergies:  Review of patient's allergies indicates:   Allergen Reactions    Glimepiride        Vitals:  Routine    Diet: cardiac diet, diabetic diet: 2000 calorie, and 2 gram sodium diet    Activities:   Activity as tolerated    Goals of Care Treatment Preferences:  Code Status: Full Code          What is most important right now is to focus on remaining as independent as possible, improvement in condition but with limits to invasive therapies.  Accordingly, we have decided that the best plan to meet the patient's goals includes continuing with treatment.      Labs:  As per facility    Nursing Precautions:  Aspiration , Fall, and Pressure ulcer prevention    Consults:   PT to evaluate and treat- 3 times a week and OT to evaluate and treat- 3 times a week     Miscellaneous Care: Routine Skin for Bedridden Patients:  Apply moisture barrier cream to all    Diabetes Care: Diabetes: Check blood sugar. Fingerstick blood  sugar AC and HS    CHF Care: Daily Weight with notification of MD/NP of 2lb or > increase in 24 hours    V/s and O2 sat every shift    Oxygen as needed for sats <90%    Report abnormal breath sounds to MD/NP    Edema checks q shift- notify MD/NP of increased edema    Task segmentation by nursing for daily care to decrease exertion    CHF education to include diet ,medication, and CHF flags for MD notification              Medications: Discontinue all previous medication orders, if any. See new list below.     Medication List        START taking these medications      azithromycin 500 MG tablet  Commonly known as: ZITHROMAX  Take 1 tablet (500 mg total) by mouth once daily. for 1 day     cefpodoxime 200 MG tablet  Commonly known as: VANTIN  Take 1 tablet (200 mg total) by mouth every 12 (twelve) hours. for 3 days            CONTINUE taking these medications      albuterol 0.63 mg/3 mL Nebu  Commonly known as: ACCUNEB  Take 0.63 mg by nebulization every 6 (six) hours as needed. Rescue     anastrozole 1 mg Tab  Commonly known as: ARIMIDEX  Take 1 mg by mouth once daily.     bisoprolol 10 MG tablet  Commonly known as: ZEBETA  Take 1 tablet by mouth once daily.     cholecalciferol (vitamin D3) 1,250 mcg (50,000 unit) capsule  Take 50,000 Units by mouth every 30 days.     diphenhydrAMINE 50 MG capsule  Commonly known as: BENADRYL  Take 50 mg by mouth nightly as needed for Itching or Insomnia.     donepeziL 5 MG tablet  Commonly known as: ARICEPT  Take 1 tablet by mouth every evening.     ELIQUIS 5 mg Tab  Generic drug: apixaban  Take 5 mg by mouth 2 (two) times daily.     empagliflozin 10 mg tablet  Commonly known as: Jardiance  Take 1 tablet by mouth once daily.     EScitalopram oxalate 10 MG tablet  Commonly known as: LEXAPRO  Take 10 mg by mouth once daily.     furosemide 20 MG tablet  Commonly known as: LASIX  Take 20 mg by mouth 2 (two) times daily.     glyBURIDE 5 MG tablet  Commonly known as: DIABETA  Take 5 mg by  mouth every morning.     melatonin 5 mg  Commonly known as: MELATIN  Take 1 tablet by mouth every evening.     nitrofurantoin (macrocrystal-monohydrate) 100 MG capsule  Commonly known as: MACROBID  Take 100 mg by mouth 2 (two) times daily. X 7 days     nystatin powder  Commonly known as: MYCOSTATIN  Apply topically 2 (two) times a day.     pantoprazole 40 MG tablet  Commonly known as: PROTONIX  Take 1 tablet (40 mg total) by mouth once daily.     polyethylene glycol 17 gram/dose powder  Commonly known as: GLYCOLAX  Take 17 g by mouth daily as needed for Constipation. (With 8 oz of water)     QUEtiapine 25 MG Tab  Commonly known as: SEROQUEL  Take 25 mg by mouth every evening.     senna-docusate 8.6-50 mg 8.6-50 mg per tablet  Commonly known as: PERICOLACE  Take 1 tablet by mouth once daily.     sodium chloride 0.9 % PgBk 100 mL with meropenem 1 gram SolR 1 g  Inject 1 g into the vein every 12 (twelve) hours.     spironolactone 25 MG tablet  Commonly known as: ALDACTONE  Take 25 mg by mouth once daily.     traMADoL 50 mg tablet  Commonly known as: ULTRAM  Take 50 mg by mouth every 6 (six) hours as needed for Pain.     traZODone 50 MG tablet  Commonly known as: DESYREL  Take 50 mg by mouth every evening.                Immunizations Administered as of 11/26/2023       No immunizations on file.            This patient has had both covid vaccinations    Some patients may experience side effects after vaccination.  These may include fever, headache, muscle or joint aches.  Most symptoms resolve with 24-48 hours and do not require urgent medical evaluation unless they persist for more than 72 hours or symptoms are concerning for an unrelated medical condition.          Patient must travel by stretcher due to her bed bound state which is secondary to chronic co morbidities.    _________________________________  Anna Mcintosh MD  11/26/2023

## 2023-11-26 NOTE — PLAN OF CARE
Daughter Barbie Her has requested mom to transported to home and not return to [facility]. Daughter stated patient is not resident and was receiving SNF at facility. CM verified this with nurse Melia at [facility] who stated patient was received skilled nursing services and is not resident. MD spoke with daughter as well and provided update. HH POC faxed to [facility].    Evelyn auth request faxed to [facility].   11/26/23 1118   Final Note   Assessment Type Final Discharge Note   Anticipated Discharge Disposition Int Care Fac   Hospital Resources/Appts/Education Provided Provided patient/caregiver with written discharge plan information   Post-Acute Status   Discharge Delays (!) PFC Arranged Transportation     [facility] - Cardiology Stepdown  Discharge Final Note    [facility]    Expected Discharge Date: 11/26/2023    Final Discharge Note (most recent)       Final Note - 11/26/23 1118          Final Note    Assessment Type Final Discharge Note (P)      Anticipated Discharge Disposition Intermediate Care Facility for retirement or Supportive Care (P)      Hospital Resources/Appts/Education Provided Provided patient/caregiver with written discharge plan information (P)         Post-Acute Status    Discharge Delays PFC Arranged Transportation (P)                      Important Message from Medicare             Contact Info       [facility]   Relationship: PCP - General    [address]  [place] [address]   Phone: [phone]       Next Steps: Follow up in 1 week(s)    [facility]   Specialty: Nursing Home Agency, SNF Agency    [address]  [facility] [address]   Phone: [phone]       Next Steps: Follow up

## 2023-11-26 NOTE — PLAN OF CARE
Patient is ready for discharge. Patient stable alert and oriented to self. IVs removed. No complaints of pain. Discussed discharge plan. Reviewed medications and side effects, appointments, and answered questions with family.   Pt d/c to home with home health orders per daughter Barbie's request.      Problem: Infection  Goal: Absence of Infection Signs and Symptoms  Outcome: Met     Problem: Adult Inpatient Plan of Care  Goal: Plan of Care Review  Outcome: Met  Goal: Patient-Specific Goal (Individualized)  Outcome: Met  Goal: Absence of Hospital-Acquired Illness or Injury  Outcome: Met  Goal: Optimal Comfort and Wellbeing  Outcome: Met  Goal: Readiness for Transition of Care  Outcome: Met     Problem: Bariatric Environmental Safety  Goal: Safety Maintained with Care  Outcome: Met     Problem: Diabetes Comorbidity  Goal: Blood Glucose Level Within Targeted Range  Outcome: Met     Problem: Impaired Wound Healing  Goal: Optimal Wound Healing  Outcome: Met     Problem: Skin Injury Risk Increased  Goal: Skin Health and Integrity  Outcome: Met     Problem: SLP  Goal: SLP Goal  Description: Speech Language pathology Goals:   1. Pt will tolerate PO trials to help guide SLP POC.   Outcome: Met     Problem: Fluid Imbalance (Pneumonia)  Goal: Fluid Balance  Outcome: Met     Problem: Infection (Pneumonia)  Goal: Resolution of Infection Signs and Symptoms  Outcome: Met     Problem: Respiratory Compromise (Pneumonia)  Goal: Effective Oxygenation and Ventilation  Outcome: Met

## 2023-11-26 NOTE — PLAN OF CARE
[facility] - Cardiology Stepdown      HOME HEALTH ORDERS  FACE TO FACE ENCOUNTER    Patient Name: Dania Her  YOB: 1940    PCP: Lyndon Rinaldi -   PCP Address: [address] [facility] [place] [address]  PCP Phone Number: [phone]  PCP Fax: [phone]    Encounter Date: 11/24/23    Admit to Home Health    Diagnoses:  Active Hospital Problems    Diagnosis  POA    *CAP (community acquired pneumonia) [J18.9]  Unknown    Chronic heart failure [I50.9]  Yes    Urinary tract infection due to extended-spectrum beta lactamase (ESBL) producing Escherichia coli [N39.0, B96.29, Z16.12]  Yes    Dementia [F03.90]  Yes    Acute metabolic encephalopathy [G93.41]  Yes    Chronic pain of both shoulders [M25.511, G89.29, M25.512]  Yes    Elevated troponin [R79.89]  Yes    Primary hypertension [I10]  Yes    Type 2 diabetes mellitus, with long-term current use of insulin [E11.9, Z79.4]  Not Applicable    Atrial fibrillation [I48.91]  Yes    Breast cancer [C50.919]  Yes    History of DVT of lower extremity [Z86.718]  Not Applicable      Resolved Hospital Problems   No resolved problems to display.       Follow Up Appointments:  No future appointments.    Allergies:  Review of patient's allergies indicates:   Allergen Reactions    Glimepiride        Medications: Review discharge medications with patient and family and provide education.    Current Facility-Administered Medications   Medication Dose Route Frequency Provider Last Rate Last Admin    acetaminophen tablet 1,000 mg  1,000 mg Oral Q8H PRN Dee Figueroa PA-C   1,000 mg at 11/25/23 2024    acetaminophen tablet 650 mg  650 mg Oral Q4H PRN Dee Figueroa PA-C        aluminum-magnesium hydroxide-simethicone 200-200-20 mg/5 mL suspension 30 mL  30 mL Oral QID PRN Dee Figueroa PA-C   30 mL at 11/25/23 0916    anastrozole tablet 1 mg  1 mg Oral Daily Dee Figueroa PA-C   1 mg at 11/26/23 0933    apixaban tablet 5 mg  5 mg Oral BID Arnaldo  MD Anna   5 mg at 11/26/23 0933    azithromycin tablet 500 mg  500 mg Oral Daily Anna Mcintosh MD   500 mg at 11/26/23 0933    bisacodyL suppository 10 mg  10 mg Rectal Daily PRN Dee Figueroa PA-C        cefpodoxime tablet 200 mg  200 mg Oral Q12H Anna Mcintosh MD   200 mg at 11/26/23 0933    dextrose 10% bolus 125 mL 125 mL  12.5 g Intravenous PRN Dee Figueroa PA-C   Stopped at 11/25/23 0705    dextrose 10% bolus 250 mL 250 mL  25 g Intravenous PRN Dee Figueroa PA-C   Stopped at 11/24/23 2121    diphenhydrAMINE capsule 25 mg  25 mg Oral Nightly PRN Dee Figueroa PA-C        donepeziL tablet 5 mg  5 mg Oral QHS Dee Figueroa PA-C   5 mg at 11/25/23 2024    furosemide tablet 20 mg  20 mg Oral BID Anna Mcintosh MD   20 mg at 11/26/23 0933    glucagon (human recombinant) injection 1 mg  1 mg Intramuscular PRN Dee Figueroa PA-C        glucose chewable tablet 16 g  16 g Oral PRN Dee Figueroa PA-C   16 g at 11/25/23 0638    glucose chewable tablet 24 g  24 g Oral PRN Dee Figueroa PA-C        insulin aspart U-100 pen 0-5 Units  0-5 Units Subcutaneous QID (AC + HS) PRN Dee Figueroa PA-C        levalbuterol nebulizer solution 0.63 mg  0.63 mg Nebulization Q6H PRN Anna Mcintosh MD        melatonin tablet 6 mg  6 mg Oral Nightly Dee Figueroa PA-C   6 mg at 11/25/23 2024    metoprolol succinate (TOPROL-XL) 24 hr tablet 200 mg  200 mg Oral Daily Anna Mcintosh MD   200 mg at 11/26/23 0933    miconazole NITRATE 2 % top powder   Topical (Top) BID Dee Figueroa PA-C   Given at 11/26/23 0935    naloxone 0.4 mg/mL injection 0.02 mg  0.02 mg Intravenous PRN Dee Figueroa PA-C        ondansetron disintegrating tablet 8 mg  8 mg Oral Q8H PRN Dee Figueroa PA-C        pantoprazole EC tablet 40 mg  40 mg Oral Daily Dee Figueroa PA-C   40 mg at 11/26/23 0933    polyethylene glycol packet 17 g  17 g Oral BID PRN Dee Figueroa PA-C         prochlorperazine injection Soln 5 mg  5 mg Intravenous Q6H PRN Dee Figueroa PA-C        QUEtiapine tablet 25 mg  25 mg Oral QHS Anna Mcintosh MD   25 mg at 11/25/23 2024    senna-docusate 8.6-50 mg per tablet 1 tablet  1 tablet Oral Daily Dee Figueroa PA-C   1 tablet at 11/26/23 0933    simethicone chewable tablet 80 mg  1 tablet Oral QID PRN Dee Figueroa PA-C        sodium chloride 0.9% flush 10 mL  10 mL Intravenous PRN Jai Real DO        sodium chloride 0.9% flush 5 mL  5 mL Intravenous PRN Dee Figueroa PA-C        spironolactone tablet 25 mg  25 mg Oral Daily Dee Figueroa PA-C   25 mg at 11/26/23 0933     Current Discharge Medication List        START taking these medications    Details   azithromycin (ZITHROMAX) 500 MG tablet Take 1 tablet (500 mg total) by mouth once daily. for 1 day  Qty: 1 tablet, Refills: 0      cefpodoxime (VANTIN) 200 MG tablet Take 1 tablet (200 mg total) by mouth every 12 (twelve) hours. for 3 days  Qty: 5 tablet, Refills: 0           CONTINUE these medications which have NOT CHANGED    Details   albuterol (ACCUNEB) 0.63 mg/3 mL Nebu Take 0.63 mg by nebulization every 6 (six) hours as needed. Rescue      anastrozole (ARIMIDEX) 1 mg Tab Take 1 mg by mouth once daily.      bisoprolol (ZEBETA) 10 MG tablet Take 1 tablet by mouth once daily.      cholecalciferol, vitamin D3, 1,250 mcg (50,000 unit) capsule Take 50,000 Units by mouth every 30 days.      diphenhydrAMINE (BENADRYL) 50 MG capsule Take 50 mg by mouth nightly as needed for Itching or Insomnia.      donepeziL (ARICEPT) 5 MG tablet Take 1 tablet by mouth every evening.      ELIQUIS 5 mg Tab Take 5 mg by mouth 2 (two) times daily.      empagliflozin (JARDIANCE) 10 mg tablet Take 1 tablet by mouth once daily.      escitalopram oxalate (LEXAPRO) 10 MG tablet Take 10 mg by mouth once daily.      furosemide (LASIX) 20 MG tablet Take 20 mg by mouth 2 (two) times daily.      glyBURIDE (DIABETA) 5 MG  tablet Take 5 mg by mouth every morning.      melatonin (MELATIN) 5 mg Take 1 tablet by mouth every evening.      nitrofurantoin, macrocrystal-monohydrate, (MACROBID) 100 MG capsule Take 100 mg by mouth 2 (two) times daily. X 7 days      nystatin (MYCOSTATIN) powder Apply topically 2 (two) times a day.      pantoprazole (PROTONIX) 40 MG tablet Take 1 tablet (40 mg total) by mouth once daily.  Qty: 30 tablet, Refills: 11      polyethylene glycol (GLYCOLAX) 17 gram/dose powder Take 17 g by mouth daily as needed for Constipation. (With 8 oz of water)      QUEtiapine (SEROQUEL) 25 MG Tab Take 25 mg by mouth every evening.      senna-docusate 8.6-50 mg (PERICOLACE) 8.6-50 mg per tablet Take 1 tablet by mouth once daily.      spironolactone (ALDACTONE) 25 MG tablet Take 25 mg by mouth once daily.      traMADoL (ULTRAM) 50 mg tablet Take 50 mg by mouth every 6 (six) hours as needed for Pain.      traZODone (DESYREL) 50 MG tablet Take 50 mg by mouth every evening.      sodium chloride 0.9 % PgBk 100 mL with meropenem 1 gram SolR 1 g Inject 1 g into the vein every 12 (twelve) hours.               I have seen and examined this patient within the last 30 days. My clinical findings that support the need for the home health skilled services and home bound status are the following:no   Weakness/numbness causing balance and gait disturbance due to Weakness/Debility making it taxing to leave home.     Diet:   cardiac diet, fluid restriction, and 2 gram sodium diet    Labs:  Report Lab results to PCP.    Referrals/ Consults  Physical Therapy to evaluate and treat. Evaluate for home safety and equipment needs; Establish/upgrade home exercise program. Perform / instruct on therapeutic exercises, gait training, transfer training, and Range of Motion.  Occupational Therapy to evaluate and treat. Evaluate home environment for safety and equipment needs. Perform/Instruct on transfers, ADL training, ROM, and therapeutic exercises.  Aide to  provide assistance with personal care, ADLs, and vital signs.    Activities:   activity as tolerated    Nursing:   Agency to admit patient within 24 hours of hospital discharge unless specified on physician order or at patient request    SN to complete comprehensive assessment including routine vital signs. Instruct on disease process and s/s of complications to report to MD. Review/verify medication list sent home with the patient at time of discharge  and instruct patient/caregiver as needed. Frequency may be adjusted depending on start of care date.     Skilled nurse to perform up to 3 visits PRN for symptoms related to diagnosis    Notify MD if SBP > 160 or < 90; DBP > 90 or < 50; HR > 120 or < 50; Temp > 101; O2 < 88%    Ok to schedule additional visits based on staff availability and patient request on consecutive days within the home health episode.    When multiple disciplines ordered:    Start of Care occurs on Sunday - Wednesday schedule remaining discipline evaluations as ordered on separate consecutive days following the start of care.    Thursday SOC -schedule subsequent evaluations Friday and Monday the following week.     Friday - Saturday SOC - schedule subsequent discipline evaluations on consecutive days starting Monday of the following week.    For all post-discharge communication and subsequent orders please contact patient's primary care physician.    Miscellaneous   Routine Skin for Bedridden Patients: Instruct patient/caregiver to apply moisture barrier cream to all skin folds and wet areas in perineal area daily and after baths and all bowel movements.    For Weight Gain > 2-3 lbs in 1 day or 4-6 lbs over 1 week notify PCP:      Home Health Aide:  Nursing Three times weekly, Physical Therapy Three times weekly, Occupational Therapy Three times weekly, and Home Health Aide Three times weekly    Wound Care Orders  no    I certify that this patient is confined to her home and needs intermittent  skilled nursing care, physical therapy, and occupational therapy.

## 2023-11-26 NOTE — PLAN OF CARE
3145 Message left for charge nurse to return call as patient is resident and ready to return. [facility]    1025  spoke with SHAYY Contreras who stated she does not have access to [facility] and request orders be faxed to [phone]. CM was on hold >7mins.

## 2023-11-27 DIAGNOSIS — J18.9 COMMUNITY ACQUIRED PNEUMONIA, UNSPECIFIED LATERALITY: Primary | ICD-10-CM

## 2023-11-27 RX ORDER — CEFPODOXIME PROXETIL 200 MG/1
200 TABLET, FILM COATED ORAL EVERY 12 HOURS
Qty: 6 TABLET | Refills: 0 | Status: SHIPPED | OUTPATIENT
Start: 2023-11-27 | End: 2023-11-30

## 2023-11-27 RX ORDER — AZITHROMYCIN 500 MG/1
500 TABLET, FILM COATED ORAL DAILY
Qty: 1 TABLET | Refills: 0 | Status: SHIPPED | OUTPATIENT
Start: 2023-11-27

## 2023-11-29 LAB
BACTERIA BLD CULT: ABNORMAL
BACTERIA BLD CULT: NORMAL

## 2024-02-26 PROBLEM — N39.0 URINARY TRACT INFECTION DUE TO EXTENDED-SPECTRUM BETA LACTAMASE (ESBL) PRODUCING ESCHERICHIA COLI: Status: RESOLVED | Noted: 2023-11-02 | Resolved: 2024-02-26

## 2024-02-26 PROBLEM — B96.29 URINARY TRACT INFECTION DUE TO EXTENDED-SPECTRUM BETA LACTAMASE (ESBL) PRODUCING ESCHERICHIA COLI: Status: RESOLVED | Noted: 2023-11-02 | Resolved: 2024-02-26

## 2024-02-26 PROBLEM — J18.9 CAP (COMMUNITY ACQUIRED PNEUMONIA): Status: RESOLVED | Noted: 2023-11-26 | Resolved: 2024-02-26

## 2024-02-26 PROBLEM — Z16.12 URINARY TRACT INFECTION DUE TO EXTENDED-SPECTRUM BETA LACTAMASE (ESBL) PRODUCING ESCHERICHIA COLI: Status: RESOLVED | Noted: 2023-11-02 | Resolved: 2024-02-26
